# Patient Record
Sex: FEMALE | Race: WHITE | Employment: UNEMPLOYED | URBAN - METROPOLITAN AREA
[De-identification: names, ages, dates, MRNs, and addresses within clinical notes are randomized per-mention and may not be internally consistent; named-entity substitution may affect disease eponyms.]

---

## 2017-05-08 PROCEDURE — 87186 SC STD MICRODIL/AGAR DIL: CPT | Performed by: FAMILY MEDICINE

## 2017-05-08 PROCEDURE — 87088 URINE BACTERIA CULTURE: CPT | Performed by: FAMILY MEDICINE

## 2017-05-08 PROCEDURE — 87086 URINE CULTURE/COLONY COUNT: CPT | Performed by: FAMILY MEDICINE

## 2017-10-10 ENCOUNTER — HOSPITAL ENCOUNTER (EMERGENCY)
Facility: HOSPITAL | Age: 52
Discharge: HOME | End: 2017-10-10
Attending: EMERGENCY MEDICINE
Payer: COMMERCIAL

## 2017-10-10 ENCOUNTER — APPOINTMENT (OUTPATIENT)
Dept: GENERAL RADIOLOGY | Facility: HOSPITAL | Age: 52
End: 2017-10-10
Attending: EMERGENCY MEDICINE
Payer: COMMERCIAL

## 2017-10-10 VITALS
BODY MASS INDEX: 21.71 KG/M2 | HEIGHT: 62 IN | RESPIRATION RATE: 16 BRPM | DIASTOLIC BLOOD PRESSURE: 83 MMHG | OXYGEN SATURATION: 99 % | HEART RATE: 79 BPM | TEMPERATURE: 98 F | WEIGHT: 118 LBS | SYSTOLIC BLOOD PRESSURE: 114 MMHG

## 2017-10-10 DIAGNOSIS — D69.6 THROMBOCYTOPENIA (HCC): ICD-10-CM

## 2017-10-10 DIAGNOSIS — F10.10 CHRONIC ALCOHOL ABUSE: ICD-10-CM

## 2017-10-10 DIAGNOSIS — I95.9 HYPOTENSION, UNSPECIFIED HYPOTENSION TYPE: ICD-10-CM

## 2017-10-10 DIAGNOSIS — R55 SYNCOPE, NEAR: Primary | ICD-10-CM

## 2017-10-10 PROCEDURE — 85025 COMPLETE CBC W/AUTO DIFF WBC: CPT | Performed by: EMERGENCY MEDICINE

## 2017-10-10 PROCEDURE — 96375 TX/PRO/DX INJ NEW DRUG ADDON: CPT

## 2017-10-10 PROCEDURE — 84484 ASSAY OF TROPONIN QUANT: CPT | Performed by: EMERGENCY MEDICINE

## 2017-10-10 PROCEDURE — 80053 COMPREHEN METABOLIC PANEL: CPT | Performed by: EMERGENCY MEDICINE

## 2017-10-10 PROCEDURE — 71010 XR CHEST AP PORTABLE  (CPT=71010): CPT | Performed by: EMERGENCY MEDICINE

## 2017-10-10 PROCEDURE — 99285 EMERGENCY DEPT VISIT HI MDM: CPT

## 2017-10-10 PROCEDURE — 96365 THER/PROPH/DIAG IV INF INIT: CPT

## 2017-10-10 PROCEDURE — 83690 ASSAY OF LIPASE: CPT | Performed by: EMERGENCY MEDICINE

## 2017-10-10 PROCEDURE — 93005 ELECTROCARDIOGRAM TRACING: CPT

## 2017-10-10 PROCEDURE — 96361 HYDRATE IV INFUSION ADD-ON: CPT

## 2017-10-10 PROCEDURE — 96366 THER/PROPH/DIAG IV INF ADDON: CPT

## 2017-10-10 PROCEDURE — 93010 ELECTROCARDIOGRAM REPORT: CPT

## 2017-10-10 RX ORDER — ONDANSETRON 2 MG/ML
4 INJECTION INTRAMUSCULAR; INTRAVENOUS ONCE
Status: COMPLETED | OUTPATIENT
Start: 2017-10-10 | End: 2017-10-10

## 2017-10-10 RX ORDER — SODIUM CHLORIDE 9 MG/ML
INJECTION, SOLUTION INTRAVENOUS CONTINUOUS
Status: DISCONTINUED | OUTPATIENT
Start: 2017-10-10 | End: 2017-10-10

## 2017-10-10 RX ORDER — HYDROMORPHONE HYDROCHLORIDE 1 MG/ML
0.5 INJECTION, SOLUTION INTRAMUSCULAR; INTRAVENOUS; SUBCUTANEOUS ONCE
Status: DISCONTINUED | OUTPATIENT
Start: 2017-10-10 | End: 2017-10-10

## 2017-10-10 NOTE — ED PROVIDER NOTES
Patient Seen in: [facility] Emergency Department    History   Patient presents with:  Nausea/Vomiting/Diarrhea (gastrointestinal)  Dizziness (neurologic)  Hypotension (cardiovascular)  Abdomen/Flank Pain (GI/)    Stated Complaint: DIZZINESS    HPI HPI.  Constitutional and vital signs reviewed. All other systems reviewed and negative except as noted above. PSFH elements reviewed from today and agreed except as otherwise stated in HPI.     Physical Exam   ED Triage Vitals [10/10/17 1106]  BP: 1 Narrative: The following orders were created for panel order CBC WITH DIFFERENTIAL WITH PLATELET.   Procedure                               Abnormality         Status                     ---------                               -----------         ------ [place], [address] [address]  [phone]            Medications Prescribed:  Discharge Medication List as of 10/10/2017  2:26 PM

## 2017-10-10 NOTE — ED INITIAL ASSESSMENT (HPI)
PER MEDICS THEY WERE CALLED FOR PT THAT WAS DIZZY AND HAD FALLEN. ON MEDICS ARRIVAL THEY WERE UNABLE TO OBTAIN A BP AND NOTED NO RADIAL PULSES. PT STATES SHE IS FEELING MUCH BETTER NOW, AAOX3, DENIES CP/FRITZ HOWEVER DID HAVE CP ON Sunday.

## 2018-07-16 ENCOUNTER — APPOINTMENT (OUTPATIENT)
Dept: INTERVENTIONAL RADIOLOGY/VASCULAR | Facility: HOSPITAL | Age: 53
DRG: 062 | End: 2018-07-16
Attending: PHYSICIAN ASSISTANT
Payer: COMMERCIAL

## 2018-07-16 ENCOUNTER — ANESTHESIA (OUTPATIENT)
Dept: PERIOP | Facility: HOSPITAL | Age: 53
DRG: 062 | End: 2018-07-16
Payer: COMMERCIAL

## 2018-07-16 ENCOUNTER — APPOINTMENT (OUTPATIENT)
Dept: GENERAL RADIOLOGY | Facility: HOSPITAL | Age: 53
DRG: 062 | End: 2018-07-16
Attending: EMERGENCY MEDICINE
Payer: COMMERCIAL

## 2018-07-16 ENCOUNTER — APPOINTMENT (OUTPATIENT)
Dept: CT IMAGING | Facility: HOSPITAL | Age: 53
DRG: 062 | End: 2018-07-16
Attending: EMERGENCY MEDICINE
Payer: COMMERCIAL

## 2018-07-16 ENCOUNTER — HOSPITAL ENCOUNTER (INPATIENT)
Facility: HOSPITAL | Age: 53
LOS: 11 days | Discharge: ANOTHER HOSPITAL | DRG: 062 | End: 2018-07-27
Attending: EMERGENCY MEDICINE | Admitting: INTERNAL MEDICINE
Payer: COMMERCIAL

## 2018-07-16 ENCOUNTER — ANESTHESIA EVENT (OUTPATIENT)
Dept: PERIOP | Facility: HOSPITAL | Age: 53
DRG: 062 | End: 2018-07-16
Payer: COMMERCIAL

## 2018-07-16 DIAGNOSIS — I48.91 ATRIAL FIBRILLATION, UNSPECIFIED TYPE (HCC): ICD-10-CM

## 2018-07-16 DIAGNOSIS — E87.6 HYPOKALEMIA: ICD-10-CM

## 2018-07-16 DIAGNOSIS — R53.1 ACUTE LEFT-SIDED WEAKNESS: Primary | ICD-10-CM

## 2018-07-16 DIAGNOSIS — Z79.01 ANTICOAGULATED: ICD-10-CM

## 2018-07-16 DIAGNOSIS — F10.10 ALCOHOL ABUSE: ICD-10-CM

## 2018-07-16 DIAGNOSIS — I42.6 ALCOHOLIC CARDIOMYOPATHY (HCC): ICD-10-CM

## 2018-07-16 LAB
ALBUMIN SERPL-MCNC: 3.3 G/DL (ref 3.5–4.8)
ALBUMIN SERPL-MCNC: 3.6 G/DL (ref 3.5–4.8)
ALP LIVER SERPL-CCNC: 100 U/L (ref 41–108)
ALP LIVER SERPL-CCNC: 90 U/L (ref 41–108)
ALT SERPL-CCNC: 174 U/L (ref 14–54)
ALT SERPL-CCNC: 210 U/L (ref 14–54)
APTT PPP: 30 SECONDS (ref 26.1–34.6)
AST SERPL-CCNC: 404 U/L (ref 15–41)
AST SERPL-CCNC: 531 U/L (ref 15–41)
ATRIAL RATE: 88 BPM
BASOPHILS # BLD AUTO: 0.08 X10(3) UL (ref 0–0.1)
BASOPHILS NFR BLD AUTO: 1.5 %
BILIRUB SERPL-MCNC: 2.1 MG/DL (ref 0.1–2)
BILIRUB SERPL-MCNC: 2.3 MG/DL (ref 0.1–2)
BUN BLD-MCNC: 25 MG/DL (ref 8–20)
BUN BLD-MCNC: 28 MG/DL (ref 8–20)
CALCIUM BLD-MCNC: 10 MG/DL (ref 8.3–10.3)
CALCIUM BLD-MCNC: 8.7 MG/DL (ref 8.3–10.3)
CHLORIDE: 102 MMOL/L (ref 101–111)
CHLORIDE: 96 MMOL/L (ref 101–111)
CHOLEST SMN-MCNC: 269 MG/DL (ref ?–200)
CO2: 20 MMOL/L (ref 22–32)
CO2: 25 MMOL/L (ref 22–32)
CREAT BLD-MCNC: 0.64 MG/DL (ref 0.55–1.02)
CREAT BLD-MCNC: 0.89 MG/DL (ref 0.55–1.02)
EOSINOPHIL # BLD AUTO: 0.03 X10(3) UL (ref 0–0.3)
EOSINOPHIL NFR BLD AUTO: 0.5 %
ERYTHROCYTE [DISTWIDTH] IN BLOOD BY AUTOMATED COUNT: 12.1 % (ref 11.5–16)
EST. AVERAGE GLUCOSE BLD GHB EST-MCNC: 123 MG/DL (ref 68–126)
ETHYL ALCOHOL: <3 MG/DL (ref ?–3)
GLUCOSE BLD-MCNC: 88 MG/DL (ref 70–99)
GLUCOSE BLD-MCNC: 91 MG/DL (ref 70–99)
GLUCOSE BLD-MCNC: 94 MG/DL (ref 65–99)
HAV IGM SER QL: 1.6 MG/DL (ref 1.8–2.5)
HBA1C MFR BLD HPLC: 5.9 % (ref ?–5.7)
HCT VFR BLD AUTO: 44.5 % (ref 34–50)
HDLC SERPL-MCNC: 60 MG/DL (ref 45–?)
HDLC SERPL: 4.48 {RATIO} (ref ?–4.44)
HGB BLD-MCNC: 15.6 G/DL (ref 12–16)
IMMATURE GRANULOCYTE COUNT: 0.03 X10(3) UL (ref 0–1)
IMMATURE GRANULOCYTE RATIO %: 0.5 %
INR BLD: 1.09 (ref 0.9–1.1)
INR BLD: 1.51 (ref 0.9–1.1)
LDLC SERPL CALC-MCNC: 190 MG/DL (ref ?–130)
LYMPHOCYTES # BLD AUTO: 1.64 X10(3) UL (ref 0.9–4)
LYMPHOCYTES NFR BLD AUTO: 29.9 %
M PROTEIN MFR SERPL ELPH: 8.3 G/DL (ref 6.1–8.3)
M PROTEIN MFR SERPL ELPH: 9.4 G/DL (ref 6.1–8.3)
MCH RBC QN AUTO: 35.9 PG (ref 27–33.2)
MCHC RBC AUTO-ENTMCNC: 35.1 G/DL (ref 31–37)
MCV RBC AUTO: 102.3 FL (ref 81–100)
MONOCYTES # BLD AUTO: 0.67 X10(3) UL (ref 0.1–1)
MONOCYTES NFR BLD AUTO: 12.2 %
NEUTROPHIL ABS PRELIM: 3.03 X10 (3) UL (ref 1.3–6.7)
NEUTROPHILS # BLD AUTO: 3.03 X10(3) UL (ref 1.3–6.7)
NEUTROPHILS NFR BLD AUTO: 55.4 %
NONHDLC SERPL-MCNC: 209 MG/DL (ref ?–130)
P AXIS: 13 DEGREES
P-R INTERVAL: 146 MS
PLATELET # BLD AUTO: 120 10(3)UL (ref 150–450)
POTASSIUM SERPL-SCNC: 3.1 MMOL/L (ref 3.6–5.1)
POTASSIUM SERPL-SCNC: 3.4 MMOL/L (ref 3.6–5.1)
PSA SERPL DL<=0.01 NG/ML-MCNC: 14.5 SECONDS (ref 12.4–14.7)
PSA SERPL DL<=0.01 NG/ML-MCNC: 18.8 SECONDS (ref 12.4–14.7)
Q-T INTERVAL: 394 MS
QRS DURATION: 94 MS
QTC CALCULATION (BEZET): 476 MS
R AXIS: 63 DEGREES
RBC # BLD AUTO: 4.35 X10(6)UL (ref 3.8–5.1)
RED CELL DISTRIBUTION WIDTH-SD: 45.9 FL (ref 35.1–46.3)
SODIUM SERPL-SCNC: 136 MMOL/L (ref 136–144)
SODIUM SERPL-SCNC: 137 MMOL/L (ref 136–144)
T AXIS: 130 DEGREES
TRIGL SERPL-MCNC: 97 MG/DL (ref ?–150)
TROPONIN: <0.046 NG/ML (ref ?–0.05)
TROPONIN: <0.046 NG/ML (ref ?–0.05)
VENTRICULAR RATE: 88 BPM
VLDLC SERPL CALC-MCNC: 19 MG/DL (ref 5–40)
WBC # BLD AUTO: 5.5 X10(3) UL (ref 4–13)

## 2018-07-16 PROCEDURE — B31QYZZ FLUOROSCOPY OF CERVICO-CEREBRAL ARCH USING OTHER CONTRAST: ICD-10-PCS | Performed by: RADIOLOGY

## 2018-07-16 PROCEDURE — 70496 CT ANGIOGRAPHY HEAD: CPT | Performed by: EMERGENCY MEDICINE

## 2018-07-16 PROCEDURE — 70450 CT HEAD/BRAIN W/O DYE: CPT | Performed by: EMERGENCY MEDICINE

## 2018-07-16 PROCEDURE — 71045 X-RAY EXAM CHEST 1 VIEW: CPT | Performed by: EMERGENCY MEDICINE

## 2018-07-16 PROCEDURE — B41FYZZ FLUOROSCOPY OF RIGHT LOWER EXTREMITY ARTERIES USING OTHER CONTRAST: ICD-10-PCS | Performed by: RADIOLOGY

## 2018-07-16 PROCEDURE — 3E03317 INTRODUCTION OF OTHER THROMBOLYTIC INTO PERIPHERAL VEIN, PERCUTANEOUS APPROACH: ICD-10-PCS | Performed by: EMERGENCY MEDICINE

## 2018-07-16 PROCEDURE — 70498 CT ANGIOGRAPHY NECK: CPT | Performed by: EMERGENCY MEDICINE

## 2018-07-16 PROCEDURE — 99291 CRITICAL CARE FIRST HOUR: CPT | Performed by: NURSE PRACTITIONER

## 2018-07-16 RX ORDER — SENNOSIDES 8.6 MG
17.2 TABLET ORAL NIGHTLY
Status: DISCONTINUED | OUTPATIENT
Start: 2018-07-16 | End: 2018-07-27

## 2018-07-16 RX ORDER — LIDOCAINE HYDROCHLORIDE 10 MG/ML
INJECTION, SOLUTION INFILTRATION; PERINEURAL
Status: COMPLETED
Start: 2018-07-16 | End: 2018-07-16

## 2018-07-16 RX ORDER — SODIUM CHLORIDE 9 MG/ML
INJECTION, SOLUTION INTRAVENOUS CONTINUOUS
Status: ACTIVE | OUTPATIENT
Start: 2018-07-16 | End: 2018-07-18

## 2018-07-16 RX ORDER — METOCLOPRAMIDE HYDROCHLORIDE 5 MG/ML
10 INJECTION INTRAMUSCULAR; INTRAVENOUS EVERY 8 HOURS PRN
Status: DISCONTINUED | OUTPATIENT
Start: 2018-07-16 | End: 2018-07-27

## 2018-07-16 RX ORDER — HYDROCODONE BITARTRATE AND ACETAMINOPHEN 5; 325 MG/1; MG/1
1 TABLET ORAL EVERY 6 HOURS PRN
Status: DISCONTINUED | OUTPATIENT
Start: 2018-07-16 | End: 2018-07-27

## 2018-07-16 RX ORDER — HEPARIN SODIUM 5000 [USP'U]/ML
INJECTION, SOLUTION INTRAVENOUS; SUBCUTANEOUS
Status: COMPLETED
Start: 2018-07-16 | End: 2018-07-16

## 2018-07-16 RX ORDER — MAGNESIUM OXIDE 400 MG (241.3 MG MAGNESIUM) TABLET
400 TABLET ONCE
Status: COMPLETED | OUTPATIENT
Start: 2018-07-16 | End: 2018-07-16

## 2018-07-16 RX ORDER — ACETAMINOPHEN 650 MG/1
650 SUPPOSITORY RECTAL EVERY 4 HOURS PRN
Status: DISCONTINUED | OUTPATIENT
Start: 2018-07-16 | End: 2018-07-27

## 2018-07-16 RX ORDER — POLYETHYLENE GLYCOL 3350 17 G/17G
17 POWDER, FOR SOLUTION ORAL DAILY PRN
Status: DISCONTINUED | OUTPATIENT
Start: 2018-07-16 | End: 2018-07-27

## 2018-07-16 RX ORDER — BISACODYL 10 MG
10 SUPPOSITORY, RECTAL RECTAL
Status: DISCONTINUED | OUTPATIENT
Start: 2018-07-16 | End: 2018-07-27

## 2018-07-16 RX ORDER — LABETALOL HYDROCHLORIDE 5 MG/ML
10 INJECTION, SOLUTION INTRAVENOUS ONCE AS NEEDED
Status: DISCONTINUED | OUTPATIENT
Start: 2018-07-16 | End: 2018-07-16 | Stop reason: DRUGHIGH

## 2018-07-16 RX ORDER — MORPHINE SULFATE 4 MG/ML
1 INJECTION, SOLUTION INTRAMUSCULAR; INTRAVENOUS EVERY 2 HOUR PRN
Status: DISCONTINUED | OUTPATIENT
Start: 2018-07-16 | End: 2018-07-27

## 2018-07-16 RX ORDER — DOCUSATE SODIUM 100 MG/1
100 CAPSULE, LIQUID FILLED ORAL 2 TIMES DAILY
Status: DISCONTINUED | OUTPATIENT
Start: 2018-07-16 | End: 2018-07-27

## 2018-07-16 RX ORDER — SODIUM CHLORIDE 9 MG/ML
INJECTION, SOLUTION INTRAVENOUS CONTINUOUS
Status: DISCONTINUED | OUTPATIENT
Start: 2018-07-16 | End: 2018-07-16

## 2018-07-16 RX ORDER — ATORVASTATIN CALCIUM 80 MG/1
80 TABLET, FILM COATED ORAL NIGHTLY
Status: DISCONTINUED | OUTPATIENT
Start: 2018-07-16 | End: 2018-07-27

## 2018-07-16 RX ORDER — MORPHINE SULFATE 4 MG/ML
2 INJECTION, SOLUTION INTRAMUSCULAR; INTRAVENOUS EVERY 2 HOUR PRN
Status: DISCONTINUED | OUTPATIENT
Start: 2018-07-16 | End: 2018-07-27

## 2018-07-16 RX ORDER — ONDANSETRON 2 MG/ML
4 INJECTION INTRAMUSCULAR; INTRAVENOUS EVERY 6 HOURS PRN
Status: DISCONTINUED | OUTPATIENT
Start: 2018-07-16 | End: 2018-07-27

## 2018-07-16 RX ORDER — SODIUM PHOSPHATE, DIBASIC AND SODIUM PHOSPHATE, MONOBASIC 7; 19 G/133ML; G/133ML
1 ENEMA RECTAL ONCE AS NEEDED
Status: DISCONTINUED | OUTPATIENT
Start: 2018-07-16 | End: 2018-07-27

## 2018-07-16 RX ORDER — ONDANSETRON 2 MG/ML
4 INJECTION INTRAMUSCULAR; INTRAVENOUS ONCE
Status: COMPLETED | OUTPATIENT
Start: 2018-07-16 | End: 2018-07-16

## 2018-07-16 RX ORDER — NOREPINEPHRINE BITARTRATE 1 MG/ML
INJECTION, SOLUTION INTRAVENOUS
Status: COMPLETED
Start: 2018-07-16 | End: 2018-07-16

## 2018-07-16 RX ORDER — HYDROCODONE BITARTRATE AND ACETAMINOPHEN 5; 325 MG/1; MG/1
1-2 TABLET ORAL EVERY 6 HOURS PRN
Status: DISCONTINUED | OUTPATIENT
Start: 2018-07-16 | End: 2018-07-16 | Stop reason: SDUPTHER

## 2018-07-16 RX ORDER — FAMOTIDINE 10 MG/ML
20 INJECTION, SOLUTION INTRAVENOUS 2 TIMES DAILY
Status: DISCONTINUED | OUTPATIENT
Start: 2018-07-16 | End: 2018-07-27

## 2018-07-16 RX ORDER — ONDANSETRON 2 MG/ML
4 INJECTION INTRAMUSCULAR; INTRAVENOUS EVERY 6 HOURS PRN
Status: DISCONTINUED | OUTPATIENT
Start: 2018-07-16 | End: 2018-07-16

## 2018-07-16 RX ORDER — ACETAMINOPHEN 650 MG/1
650 SUPPOSITORY RECTAL EVERY 4 HOURS PRN
Status: DISCONTINUED | OUTPATIENT
Start: 2018-07-16 | End: 2018-07-16

## 2018-07-16 RX ORDER — PHENYLEPHRINE HCL IN 0.9% NACL 50MG/250ML
PLASTIC BAG, INJECTION (ML) INTRAVENOUS CONTINUOUS PRN
Status: DISCONTINUED | OUTPATIENT
Start: 2018-07-16 | End: 2018-07-26 | Stop reason: HOSPADM

## 2018-07-16 RX ORDER — ONDANSETRON 2 MG/ML
INJECTION INTRAMUSCULAR; INTRAVENOUS
Status: DISPENSED
Start: 2018-07-16 | End: 2018-07-17

## 2018-07-16 RX ORDER — LABETALOL HYDROCHLORIDE 5 MG/ML
10 INJECTION, SOLUTION INTRAVENOUS EVERY 10 MIN PRN
Status: DISCONTINUED | OUTPATIENT
Start: 2018-07-16 | End: 2018-07-27

## 2018-07-16 RX ORDER — HYDROCODONE BITARTRATE AND ACETAMINOPHEN 5; 325 MG/1; MG/1
2 TABLET ORAL EVERY 6 HOURS PRN
Status: DISCONTINUED | OUTPATIENT
Start: 2018-07-16 | End: 2018-07-27

## 2018-07-16 RX ORDER — ARIPIPRAZOLE 15 MG/1
40 TABLET ORAL EVERY 4 HOURS
Status: COMPLETED | OUTPATIENT
Start: 2018-07-16 | End: 2018-07-17

## 2018-07-16 NOTE — ED PROVIDER NOTES
Patient Seen in: [address]    History   Patient presents with:  Stroke (neurologic)    Stated Complaint: stroke    ELSY Schneider is a 50-year-old female presenting with left-sided weakness.   Patient had an episode starting 37 stated complaint: stroke  Other systems are as noted in HPI. Constitutional and vital signs reviewed. All other systems reviewed and negative except as noted above.     Physical Exam   ED Triage Vitals [07/16/18 1437]  BP: (!) 145/107  Pulse: 103  Res ------                     CBC W/ DIFFERENTIAL[[id number]]          Abnormal            Final result                 Please view results for these tests on the individual orders.    RAINBOW DRAW BLUE   RAINBOW DRAW LAVENDER   RAINBOW DRAW LIGHT GREEN   RAINB tPA administered after discussion of risks, benefits and alternatives to IV tPA with patient and family. All inclusion, absolute exclusion, and relative exclusion criteria reviewed and verbal consent obtained.       Disposition and Plan     Clinical

## 2018-07-16 NOTE — PROCEDURES
[facility]  Pre-Procedure Note  Makayla Anderson Patient Status:  Emergency    1965 MRN [id number]   Location [address] [facility] Attending Whitney Zavala MD   Hosp Day # 0 PCP Aliya Harvey DO     Pre-Op Diagnosis:  IRVIN GUEVARA

## 2018-07-16 NOTE — ANESTHESIA PREPROCEDURE EVALUATION
PRE-OP EVALUATION    Patient Name: Taylor Anderson    Pre-op Diagnosis: * No pre-op diagnosis entered *    * No procedures listed *    * No surgeons found in log *    Pre-op vitals reviewed.   Pulse: 80  Resp: 21  BP: 143/99  SpO2: 99 %  There is no he 07/16/2018   MCHC 35.1 07/16/2018   RDW 12.1 07/16/2018   .0 (L) 07/16/2018       Lab Results  Component Value Date    07/16/2018   K 3.1 (L) 07/16/2018   CL 96 (L) 07/16/2018   CO2 25.0 07/16/2018   BUN 28 (H) 07/16/2018   CREATSERUM 0.89 07/

## 2018-07-16 NOTE — H&P
[facility] Hospitalist History and Physical      Patient presents with:  Stroke (neurologic)       PCP: Jefe Frost DO      History of Present Illness: Patient is a 47year old female with PMH sig for alcoholism, Dilated cardiomyopathy attributed to alcoholism Fam Hx  Family History   Problem Relation Age of Onset   • Hypertension Father    • Neurological Disorder Father      Parkinsons   • Neurological Disorder Mother      alzhemiers and Parkinsons       Review of Systems  Comprehensive ROS reviewed and neg pulmonary vascularity. No pleural effusion or pneumothorax. No lobar consolidation. Tortuous thoracic aorta, unchanged. CONCLUSION:  No active cardiopulmonary process identified.     Dictated by: Keisha Jiménez MD on 7/16/2018 at 15:54     Approved Critical results were discussed with Dr. Florida Noel at 1500 hr on 7/16/2018. Critical results were read back.   Dictated by: Galo Adamson MD on 7/16/2018 at 14:58     Approved by: Galo Adamson MD            Ct Stroke Cta Brain/cta Neck (w Iv)(cpt=70496/7 cerebellar arteries are unremarkable. The basilar artery has a normal course and caliber. The right vertebral artery is dominant. The left vertebral artery is developmentally diminutive and appears to functionally terminate as the left PICA.   There is a stroke, seizure disorder who has been noncompliant with coumadin (self discontinued approximately September 2017) who [facility] [place] [facility] for left sided weakness that began 40 minutes prior to arrival in the ED    Ischemic stroke  -CTA brain with acute occ

## 2018-07-16 NOTE — SIGNIFICANT EVENT
While in ED informed that a possible stroke was in the launch pad. Upon arrival Dr. Enedina Simposn was already assessing patient. Upon arrival found patient with NIH of 9, Best gaze-1, Visual-2, facial palsy-1, left arm-2, left leg-2, sensory-1.     Accompanie

## 2018-07-16 NOTE — ANESTHESIA POSTPROCEDURE EVALUATION
[address] Patient Status:  Emergency   Age/Gender 47year old female MRN [id number]   Location [address] [facility] Attending No att. providers found   Hosp Day # 0 PCP Fanny Mejia DO       Anesthesia Post-o

## 2018-07-17 ENCOUNTER — APPOINTMENT (OUTPATIENT)
Dept: CV DIAGNOSTICS | Facility: HOSPITAL | Age: 53
DRG: 062 | End: 2018-07-17
Attending: NURSE PRACTITIONER
Payer: COMMERCIAL

## 2018-07-17 ENCOUNTER — APPOINTMENT (OUTPATIENT)
Dept: GENERAL RADIOLOGY | Facility: HOSPITAL | Age: 53
DRG: 062 | End: 2018-07-17
Attending: HOSPITALIST
Payer: COMMERCIAL

## 2018-07-17 ENCOUNTER — APPOINTMENT (OUTPATIENT)
Dept: CT IMAGING | Facility: HOSPITAL | Age: 53
DRG: 062 | End: 2018-07-17
Attending: PHYSICIAN ASSISTANT
Payer: COMMERCIAL

## 2018-07-17 PROBLEM — I63.331 CEREBRAL INFARCTION DUE TO THROMBOSIS OF RIGHT POSTERIOR CEREBRAL ARTERY (HCC): Status: ACTIVE | Noted: 2018-07-17

## 2018-07-17 LAB
ALBUMIN SERPL-MCNC: 2.7 G/DL (ref 3.5–4.8)
ALP LIVER SERPL-CCNC: 82 U/L (ref 41–108)
ALT SERPL-CCNC: 103 U/L (ref 14–54)
AMPHETAMINE URINE: NEGATIVE
AST SERPL-CCNC: 188 U/L (ref 15–41)
BARBITURATES URINE: NEGATIVE
BASOPHILS # BLD AUTO: 0.08 X10(3) UL (ref 0–0.1)
BASOPHILS NFR BLD AUTO: 1.5 %
BENZODIAZEPINES URINE: NEGATIVE
BILIRUB SERPL-MCNC: 2.5 MG/DL (ref 0.1–2)
BUN BLD-MCNC: 15 MG/DL (ref 8–20)
BUN BLD-MCNC: 17 MG/DL (ref 8–20)
CALCIUM BLD-MCNC: 8 MG/DL (ref 8.3–10.3)
CALCIUM BLD-MCNC: 8.4 MG/DL (ref 8.3–10.3)
CANNABINOID URINE: NEGATIVE
CHLORIDE: 109 MMOL/L (ref 101–111)
CHLORIDE: 110 MMOL/L (ref 101–111)
CO2: 20 MMOL/L (ref 22–32)
CO2: 23 MMOL/L (ref 22–32)
COCAINE URINE: NEGATIVE
CREAT BLD-MCNC: 0.62 MG/DL (ref 0.55–1.02)
CREAT BLD-MCNC: 0.67 MG/DL (ref 0.55–1.02)
EOSINOPHIL # BLD AUTO: 0.05 X10(3) UL (ref 0–0.3)
EOSINOPHIL NFR BLD AUTO: 0.9 %
ERYTHROCYTE [DISTWIDTH] IN BLOOD BY AUTOMATED COUNT: 12 % (ref 11.5–16)
ETHYL ALCOHOL, QUALITATIVE: NEGATIVE
GLUCOSE BLD-MCNC: 110 MG/DL (ref 70–99)
GLUCOSE BLD-MCNC: 113 MG/DL (ref 65–99)
GLUCOSE BLD-MCNC: 122 MG/DL (ref 65–99)
GLUCOSE BLD-MCNC: 139 MG/DL (ref 65–99)
GLUCOSE BLD-MCNC: 140 MG/DL (ref 70–99)
GLUCOSE BLD-MCNC: 95 MG/DL (ref 70–99)
HCG URINE QUALITATIVE: NEGATIVE
HCT VFR BLD AUTO: 37 % (ref 34–50)
HGB BLD-MCNC: 12.5 G/DL (ref 12–16)
IMMATURE GRANULOCYTE COUNT: 0.03 X10(3) UL (ref 0–1)
IMMATURE GRANULOCYTE RATIO %: 0.6 %
INR BLD: 1.44 (ref 0.9–1.1)
ISTAT BLOOD GAS BASE EXCESS: 3 MMOL/L (ref ?–30)
ISTAT BLOOD GAS HCO3: 25.7 MEQ/L (ref 22–26)
ISTAT BLOOD GAS O2 SATURATION: 100 % (ref 92–100)
ISTAT BLOOD GAS PCO2: 32 MMHG (ref 35–45)
ISTAT BLOOD GAS PH: 7.52 (ref 7.35–7.45)
ISTAT BLOOD GAS PO2: >430 MMHG (ref 80–105)
ISTAT BLOOD GAS TCO2: 27 MMOL/L (ref 22–32)
ISTAT HEMATOCRIT: 39 % (ref 34–50)
ISTAT IONIZED CALCIUM: 1.09 MMOL/L (ref 1.12–1.32)
ISTAT POTASSIUM: 3.6 MMOL/L (ref 3.6–5.1)
ISTAT SODIUM: 135 MMOL/L (ref 136–144)
LYMPHOCYTES # BLD AUTO: 1.29 X10(3) UL (ref 0.9–4)
LYMPHOCYTES NFR BLD AUTO: 23.7 %
M PROTEIN MFR SERPL ELPH: 6.9 G/DL (ref 6.1–8.3)
MCH RBC QN AUTO: 34.9 PG (ref 27–33.2)
MCHC RBC AUTO-ENTMCNC: 33.8 G/DL (ref 31–37)
MCV RBC AUTO: 103.4 FL (ref 81–100)
MONOCYTES # BLD AUTO: 0.55 X10(3) UL (ref 0.1–1)
MONOCYTES NFR BLD AUTO: 10.1 %
NEUTROPHIL ABS PRELIM: 3.45 X10 (3) UL (ref 1.3–6.7)
NEUTROPHILS # BLD AUTO: 3.45 X10(3) UL (ref 1.3–6.7)
NEUTROPHILS NFR BLD AUTO: 63.2 %
PCP URINE: NEGATIVE
PLATELET # BLD AUTO: 123 10(3)UL (ref 150–450)
POTASSIUM SERPL-SCNC: 3.9 MMOL/L (ref 3.6–5.1)
POTASSIUM SERPL-SCNC: 4.4 MMOL/L (ref 3.6–5.1)
POTASSIUM SERPL-SCNC: 4.4 MMOL/L (ref 3.6–5.1)
PSA SERPL DL<=0.01 NG/ML-MCNC: 18.1 SECONDS (ref 12.4–14.7)
RBC # BLD AUTO: 3.58 X10(6)UL (ref 3.8–5.1)
RED CELL DISTRIBUTION WIDTH-SD: 46.3 FL (ref 35.1–46.3)
SODIUM SERPL-SCNC: 141 MMOL/L (ref 136–144)
SODIUM SERPL-SCNC: 141 MMOL/L (ref 136–144)
WBC # BLD AUTO: 5.5 X10(3) UL (ref 4–13)

## 2018-07-17 PROCEDURE — 93306 TTE W/DOPPLER COMPLETE: CPT | Performed by: NURSE PRACTITIONER

## 2018-07-17 PROCEDURE — 71045 X-RAY EXAM CHEST 1 VIEW: CPT | Performed by: HOSPITALIST

## 2018-07-17 PROCEDURE — 70450 CT HEAD/BRAIN W/O DYE: CPT | Performed by: PHYSICIAN ASSISTANT

## 2018-07-17 PROCEDURE — 99291 CRITICAL CARE FIRST HOUR: CPT | Performed by: OTHER

## 2018-07-17 RX ORDER — LORAZEPAM 2 MG/ML
1 INJECTION INTRAMUSCULAR EVERY 30 MIN PRN
Status: DISCONTINUED | OUTPATIENT
Start: 2018-07-17 | End: 2018-07-20

## 2018-07-17 RX ORDER — LORAZEPAM 2 MG/ML
2 INJECTION INTRAMUSCULAR
Status: DISCONTINUED | OUTPATIENT
Start: 2018-07-17 | End: 2018-07-20

## 2018-07-17 RX ORDER — ASPIRIN 300 MG
300 SUPPOSITORY, RECTAL RECTAL EVERY 24 HOURS
Status: DISCONTINUED | OUTPATIENT
Start: 2018-07-17 | End: 2018-07-24

## 2018-07-17 RX ORDER — DEXMEDETOMIDINE HYDROCHLORIDE 4 UG/ML
INJECTION, SOLUTION INTRAVENOUS CONTINUOUS
Status: DISCONTINUED | OUTPATIENT
Start: 2018-07-17 | End: 2018-07-20

## 2018-07-17 RX ORDER — LORAZEPAM 2 MG/ML
4 INJECTION INTRAMUSCULAR EVERY 10 MIN PRN
Status: DISCONTINUED | OUTPATIENT
Start: 2018-07-17 | End: 2018-07-20

## 2018-07-17 RX ORDER — LORAZEPAM 2 MG/ML
3 INJECTION INTRAMUSCULAR
Status: DISCONTINUED | OUTPATIENT
Start: 2018-07-17 | End: 2018-07-20

## 2018-07-17 NOTE — CONSULTS
[facility] Cardiology Consultation    Diane Anderson Patient Status:  Inpatient    1965 MRN [id number]   [facility] 6NE-A Attending Damaris Dove MD   [facility] Day [facility] PCP Avtar Bateman DO     Reason for Consultation:  Acute CVA, ETO (CORONARY)      Comment: LAD-thrombotic event  No date:   2018: IR ANGIOGRAM CEREBRAL CAROTID BILATERAL      Comment:    No date:   Family History   Problem Relation Age of Onset   • Hypertension Father    • Neurological Disorder Father changes        Labs:   HEM:  Recent Labs   Lab  07/16/18   1433  07/17/18   0414   WBC  5.5  5.5   HGB  15.6  12.5   PLT  120.0*  123.0*       Chem:  Recent Labs   Lab  07/16/18   1433  07/16/18   1746  07/17/18   0414   NA  137  136  141   K  3.1*  3.4* alternative in this setting.       Valerie Nichols  7/17/2018  7:14 AM

## 2018-07-17 NOTE — PROGRESS NOTES
[facility] Hospitalist Progress Note                                                                   [address]  5/5/1965    CC: f/u CVA    SUBJECTIVE:    Pt seen in afternoon.  Patient Continuous Infusions:   • dexmedetomidine 0.2 mcg/kg/hr (07/17/18 1635)   • NiCARdipine     • sodium chloride 100 mL/hr at 07/17/18 1027   • phenylephrine     • norepinephrine 11 mcg/min (07/17/18 1512)     PRN: LORazepam, LORazepam, LORazepam, LORazep been noncompliant with medications  -Cardiology consulted, appreciate  -TTE ordered  -started on statin    Thrombocytopenia  -Chronic, attributed to alcohol use    DVT ppx: scd's    Meredith Ramírez MD  [facility] Hospitalist  Pager: [phone]

## 2018-07-17 NOTE — SLP NOTE
ADULT SWALLOWING EVALUATION    ASSESSMENT    ASSESSMENT/OVERALL IMPRESSION:  Patient seen for swallowing evaluation per stroke protocol.   Patient with history of CVA 5 years ago with no reported residual.  Patient does have a history of alcohol abuse and r Cardiomyopathy ([facility] 11/13    ?etoh   • CORONARY ARTERY DISEASE    • Depression    • Heart attack ([facility] 05/21/2012   • History of blood clots     legs; heart; head   • History of ETOH abuse    • Pneumonia, organism unspecified(486)    • Psychiatric disorde solid  Method of Presentation: Self presentation;Staff/Clinician assistance;Straw;Single sips; Consecutive swallows  Patient Positioning: Upright;Midline (bedside chair)    Oral Phase of Swallow:  Within Functional Limits                      Pharyngeal Phas

## 2018-07-17 NOTE — PROGRESS NOTES
[employer]  Neurocritical Care APRN Progress Note    NAME: Rachelle Anderson - ROOM: [facility]/4311-X - MRN: [id number] - Age: 47year old - :1965    History Of Present Illness:  Nadja Clayton is a 47year old female with PMH Packs/day: 0.75      Years: 10.00        Quit date: 11/3/1996  Smokeless tobacco: Never Used                      Alcohol use:  Yes              Comment: \"amount varies\" last 01/22/16      Review of Systems:   A comprehensive 14 point review of systems Ct Stroke Brain (no Iv)(cpt=70450)    Result Date: 7/16/2018  CONCLUSION:   1. No acute intracranial hemorrhage or evidence of acute territorial infarction. 2.  Stable encephalomalacia from old infarct in the right parietal lobe.   3. There are increased MG 1.6 07/16/2018       Assessment/Plan:  1.  Ischemic Stroke s/p TPA and cerviocerebral angiography      - Ischemic stroke order set      - Neuro checks Q1h      - NIH daily      - 0.9NS at 75      - Maintain SBP between 140-180      - PRN Cardene, Labetal

## 2018-07-17 NOTE — OCCUPATIONAL THERAPY NOTE
OCCUPATIONAL THERAPY EVALUATION - INPATIENT     Room Number: 4408/0034-H  Evaluation Date: 7/17/2018  Type of Evaluation: Initial  Presenting Problem: CVA    Physician Order: IP Consult to Occupational Therapy  Reason for Therapy: ADL/IADL Dysfunction and (Comment) (CIWA protocol)  Fall Risk: High fall risk    WEIGHT BEARING RESTRICTION  Weight Bearing Restriction: None                PAIN ASSESSMENT  Ratin  Location: pt reported no pain       COGNITION  Overall Cognitive Status:  Impaired  Arousal/Aler NEUROLOGICAL FINDINGS                   ACTIVITY TOLERANCE   Room air  No shortness of breath    ACTIVITIES OF DAILY LIVING ASSESSMENT  AM-PAC ‘6-Clicks’ Inpatient Daily Activity Short Form  How much help from another person does the patient currently functional level and would benefit from skilled inpatient OT to address the above deficits, maximizing patient’s ability to return safely to her prior level of function.     Patient Complexity  Occupational Profile/Medical History MODERATE - Expanded review

## 2018-07-17 NOTE — PROGRESS NOTES
07/17/18 0953   Clinical Encounter Type   Visited With Family   Patient's Supportive Strategies/Resources Patient has a Confucianist background/Family is [employer], per spouse.     Patient Spiritual Encounters   Spiritual Interventions  provided emotio

## 2018-07-17 NOTE — CM/SW NOTE
07/17/18 1400   CM/SW Referral Data   Referral Source Physician   Reason for Referral Discharge planning   Informant Patient;Spouse   Social History   Recreational Drug/Alcohol Use no   Major Changes Last 6 Months no   Domestic/Partner Violence no   Crystal Gaines

## 2018-07-17 NOTE — CONSULTS
[facility]  Interventional Neuroradiology  Consultation Note    Courtney Party [place] Patient Status:  Inpatient    1965 MRN [id number]   [facility] 6NE-A Attending Hansel Peterson MD   [facility] Day # 1 PCP Alice Bess DO     RE drinks alcohol. She reports that she does not use drugs. ALLERGIES:    Penicillins             RASH    Comment:Once when a child. Got a dose as an adult with no             reaction    MEDICATIONS:    No current outpatient prescriptions on file.     Cody Chamberlain mcg/min Intravenous Continuous   HYDROcodone-acetaminophen (NORCO) 5-325 MG per tab 1 tablet 1 tablet Oral Q6H PRN   Or      HYDROcodone-acetaminophen (NORCO) 5-325 MG per tab 2 tablet 2 tablet Oral Q6H PRN       REVIEW OF SYSTEMS:  A 10-point system was r 07/16/2018   HDL 60 07/16/2018   TRIG 97 07/16/2018   VLDL 19 07/16/2018             IMAGING:  CTA HEAD AND NECK:    1.  Acute occlusion of the right posterior cerebral artery beginning within the P2 segment and extending distally.   2. No evidence of occlu

## 2018-07-17 NOTE — BH PROGRESS NOTE
Came to see the pt earlier and PT was with the pt. Came back now to see the pt and was told the pt was just given ativan for the withdrawal symptoms. Unable to do the assessment and will check on her again tomorrow.

## 2018-07-17 NOTE — CONSULTS
[facility]  Neuro critical care consultation    Imani Anderson Patient Status:  Inpatient    1965 MRN [id number]   [facility] 6NE-A Attending Shireen Dodson MD   [facility] Day # 1 PCP Kartik Brown,      Date of Admissio event  No date:   2018: IR ANGIOGRAM CEREBRAL CAROTID BILATERAL      Comment:    No date:   Family History   Problem Relation Age of Onset   • Hypertension Father    • Neurological Disorder Father      Parkinsons   • Neurological Disorder suspension 30 mL, 30 mL, Oral, Daily PRN  •  bisacodyl (DULCOLAX) rectal suppository 10 mg, 10 mg, Rectal, Daily PRN  •  FLEET ENEMA (FLEET) 7-19 GM/118ML enema 133 mL, 1 enema, Rectal, Once PRN  •  ondansetron HCl (ZOFRAN) injection 4 mg, 4 mg, Intravenou 07/17/2018   CA 8.0 07/17/2018   ALKPHO 82 07/17/2018    07/17/2018    07/17/2018   BILT 2.5 07/17/2018   ALB 2.7 07/17/2018   TP 6.9 07/17/2018     Recent Labs   Lab  07/16/18   1433  07/17/18   0414   RBC  4.35  3.58*   HGB  15.6  12.5   HC requiring too much pressor. CT head later today post tpa  MRI brain tomorrow to assess size/location of infarct  Continue CIWA protocol. Continue home Keppra 500 mg bid for alcohol related seizures  PT/OT evaluation when able to    Cardiac:  CAD and DCM.

## 2018-07-17 NOTE — PAYOR COMM NOTE
--------------  ADMISSION REVIEW     Payor: [facility] #:  [id number]  Authorization Number: [id number]    Admit date: 7/16/18  Admit time: 18       Admitting Physician: Prachi Romano MD  Attending Physician:  Petr Hairston Comment: LAD-thrombotic event  No date:   2018: IR ANGIOGRAM CEREBRAL CAROTID BILATERAL      Comment:    No date:           Review of Systems    Positive for stated complaint: stroke  Other systems are as noted in HPI.   Constitutional and DIFFERENTIAL WITH PLATELET.   Procedure                               Abnormality         Status                     ---------                               -----------         ------                     CBC W/ DIFFERENTIAL[[id number]]          Abnormal patient  the risks and benefits of this procedure which he was able to verbalize and consent was given to administer TPA  Admission disposition: 7/16/2018  4:13 PM           tPA administered after discussion of risks, benefits and alternatives to IV Given 20 mg Intravenous Rashel Saucedo RN      HYDROcodone-acetaminophen [facility]) 5-325 MG per tab 1 tablet     Date Action Dose Route User    7/17/2018 5708 Given 1 tablet Oral Rashel Saucedo RN      HYDROcodone-acetaminophen ([facility]) 5-325 MG per t Intravenous Elizabeth Restrepo RN      ondansetron HCl [facility]) injection 4 mg     Date Action Dose Route User    7/16/2018 1510 Given 4 mg Intravenous Rosy ROMERO RN      potassium chloride (K-SOL) 40 meq/30 ml (10%) oral solution 40 mEq     Date Act functionally terminate as the left PICA. There is a 3-vessel aortic arch with minimal mixed plaque. The origins of the branch vessels appear widely patent. The bilateral subclavian arteries and innominate artery are unremarkable.      The common carot

## 2018-07-17 NOTE — PLAN OF CARE
HEMATOLOGIC - ADULT    • Free from bleeding injury Progressing        NEUROLOGICAL - ADULT    • Achieves stable or improved neurological status Progressing    • Absence of seizures Progressing    • Remains free of injury related to seizure activity Progres

## 2018-07-17 NOTE — PHYSICAL THERAPY NOTE
PHYSICAL THERAPY EVALUATION - INPATIENT     Room Number: 6017/3530-I  Evaluation Date: 7/17/2018  Type of Evaluation: Initial  Physician Order: PT Eval and Treat    Presenting Problem: L-sided weakness, acute R CVA s/p tPA  Reason for Therapy: Mobili Regularly Uses: None    Prior Level of [place]: fully indep at baseline without a device. 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  works from home but does travel for work 50% of the time.  College-aged daughter and high-school son will be home un 3-5 steps with a railing?: Total       AM-PAC Score:  Raw Score: 15   PT Approx Degree of Impairment Score: 57.7%   Standardized Score (AM-PAC Scale): 39.45   CMS Modifier (G-Code): CK    FUNCTIONAL ABILITY STATUS  Gait Assessment   Gait Assistance: Not te complexity is considered moderate. These impairments and comorbidities manifest themselves as functional limitations in independent bed mobility, transfers, gait, and stair negotiation.   The patient is below baseline and would benefit from skilled inpati

## 2018-07-18 ENCOUNTER — APPOINTMENT (OUTPATIENT)
Dept: MRI IMAGING | Facility: HOSPITAL | Age: 53
DRG: 062 | End: 2018-07-18
Attending: NURSE PRACTITIONER
Payer: COMMERCIAL

## 2018-07-18 LAB
BASOPHILS # BLD AUTO: 0.05 X10(3) UL (ref 0–0.1)
BASOPHILS NFR BLD AUTO: 1.2 %
BUN BLD-MCNC: 9 MG/DL (ref 8–20)
CALCIUM BLD-MCNC: 7.6 MG/DL (ref 8.3–10.3)
CHLORIDE: 109 MMOL/L (ref 101–111)
CO2: 18 MMOL/L (ref 22–32)
CREAT BLD-MCNC: 0.55 MG/DL (ref 0.55–1.02)
EOSINOPHIL # BLD AUTO: 0 X10(3) UL (ref 0–0.3)
EOSINOPHIL NFR BLD AUTO: 0 %
ERYTHROCYTE [DISTWIDTH] IN BLOOD BY AUTOMATED COUNT: 12.5 % (ref 11.5–16)
GLUCOSE BLD-MCNC: 111 MG/DL (ref 65–99)
GLUCOSE BLD-MCNC: 149 MG/DL (ref 70–99)
GLUCOSE BLD-MCNC: 86 MG/DL (ref 65–99)
HCT VFR BLD AUTO: 33.7 % (ref 34–50)
HGB BLD-MCNC: 11.2 G/DL (ref 12–16)
IMMATURE GRANULOCYTE COUNT: 0.03 X10(3) UL (ref 0–1)
IMMATURE GRANULOCYTE RATIO %: 0.7 %
INR BLD: 1.6 (ref 0.9–1.1)
LYMPHOCYTES # BLD AUTO: 0.24 X10(3) UL (ref 0.9–4)
LYMPHOCYTES NFR BLD AUTO: 5.6 %
MCH RBC QN AUTO: 34.6 PG (ref 27–33.2)
MCHC RBC AUTO-ENTMCNC: 33.2 G/DL (ref 31–37)
MCV RBC AUTO: 104 FL (ref 81–100)
MONOCYTES # BLD AUTO: 0.21 X10(3) UL (ref 0.1–1)
MONOCYTES NFR BLD AUTO: 4.9 %
NEUTROPHIL ABS PRELIM: 3.79 X10 (3) UL (ref 1.3–6.7)
NEUTROPHILS # BLD AUTO: 3.79 X10(3) UL (ref 1.3–6.7)
NEUTROPHILS NFR BLD AUTO: 87.6 %
PLATELET # BLD AUTO: 74 10(3)UL (ref 150–450)
POTASSIUM SERPL-SCNC: 3.8 MMOL/L (ref 3.6–5.1)
PROCALCITONIN SERPL-MCNC: 12.51 NG/ML
PSA SERPL DL<=0.01 NG/ML-MCNC: 19.6 SECONDS (ref 12.4–14.7)
RBC # BLD AUTO: 3.24 X10(6)UL (ref 3.8–5.1)
RED CELL DISTRIBUTION WIDTH-SD: 47.4 FL (ref 35.1–46.3)
SODIUM SERPL-SCNC: 138 MMOL/L (ref 136–144)
WBC # BLD AUTO: 4.3 X10(3) UL (ref 4–13)

## 2018-07-18 PROCEDURE — 70551 MRI BRAIN STEM W/O DYE: CPT | Performed by: NURSE PRACTITIONER

## 2018-07-18 PROCEDURE — 99291 CRITICAL CARE FIRST HOUR: CPT | Performed by: OTHER

## 2018-07-18 RX ORDER — POTASSIUM CHLORIDE 14.9 MG/ML
20 INJECTION INTRAVENOUS ONCE
Status: COMPLETED | OUTPATIENT
Start: 2018-07-18 | End: 2018-07-18

## 2018-07-18 RX ORDER — HEPARIN SODIUM 5000 [USP'U]/ML
5000 INJECTION, SOLUTION INTRAVENOUS; SUBCUTANEOUS EVERY 12 HOURS SCHEDULED
Status: DISCONTINUED | OUTPATIENT
Start: 2018-07-18 | End: 2018-07-19

## 2018-07-18 NOTE — SLP NOTE
Note patient is sedated on precedex as she is going through withdrawal.  She remains NPO. Plan to follow up and check status 7/19/18.     Mallorie Eckert [address] CCC-SLP  Pager [phone]

## 2018-07-18 NOTE — PROGRESS NOTES
[facility] Hospitalist Progress Note                                                                   [address]  5/5/1965    CC: f/u CVA    SUBJECTIVE:  Laying bed, sleepy, becomes agit Nightly   • docusate sodium  100 mg Oral BID   • levETIRAcetam  500 mg Intravenous Q12H     Continuous Infusions:   • dexmedetomidine Stopped (07/18/18 1300)   • NiCARdipine     • sodium chloride 100 mL/hr at 07/17/18 2000   • phenylephrine     • norepinep withdrawal on 7/17  -[facility] protocol  -Precedex drip     CAD, Dilated cardiomyopathy  -Pt has seen cardiology in the past, has been noncompliant with medications  -Cardiology consulted, appreciate  -TTE ordered  -started on statin    Thrombocytopenia  -Chron

## 2018-07-18 NOTE — PROGRESS NOTES
[facility] Cardiology Progress Note        Dede Anderson Patient Status:  Inpatient    1965 MRN [id number]   [facility] 6NE-A Attending Flex Bernstein MD   [facility] Day # 2 PCP Maddy Anton DO     Subjective: dry.     Telemetry: sinus    EKG:      Echo:      Cardiac Cath:      Labs:  HEM:  Recent Labs   Lab  07/16/18   1433  07/17/18   0414  07/18/18   0401   WBC  5.5  5.5  4.3   HGB  15.6  12.5  11.2*   PLT  120.0*  123.0*  74.0*       Chem:  Recent Labs   Lab ETOH.    9. She stopped coumadin 9/16. 10. ETOH withrawl          Plan:  Per medical team for ETOH withdrawal.  Will be a challenge. Will review echo. Volume ok at present. BP's will be quite labile based on level of agitation. Continue PRN meds.

## 2018-07-18 NOTE — PLAN OF CARE
Impaired Functional Mobility    • Achieve highest/safest level of mobility/gait Not Progressing        NEUROLOGICAL - ADULT    • Achieves stable or improved neurological status Not Progressing    • Achieves maximal functionality and self care Not Progressi

## 2018-07-18 NOTE — PHYSICAL THERAPY NOTE
PHYSICAL THERAPY TREATMENT NOTE - INPATIENT    Room Number: 6691/3283-D     Session: 1   Number of Visits to Meet Established Goals: 5    Presenting Problem: L-sided weakness, acute R CVA s/p tPA    Problem List  Principal Problem:    Acute left-sided wea Little   -   Moving from lying on back to sitting on the side of the bed?: A Little   How much help from another person does the patient currently need. ..   -   Moving to and from a bed to a chair (including a wheelchair)?: A Little   -   Need to walk in h Acute rehabilitation     PLAN  PT Treatment Plan: Bed mobility; Endurance; Patient education;Gait training;Strengthening;Transfer training;Balance training;Stair training  Rehab Potential : Good  Frequency (Obs): 5x/week    CURRENT GOALS     Goal #1 Patient

## 2018-07-18 NOTE — PLAN OF CARE
Patient VSS. Neuro status slight improvement with 2hr neuro checks. No complaint of pain, continues to be on levo drip, precedex, and NS. Restraints implemented (soft wrist, and posey vest). CIWA protocol with PRN ativan.   Patient call light within rafa

## 2018-07-18 NOTE — PROGRESS NOTES
[facility]  Neurocritical care progress Note    Kristyn Anderson Patient Status:  Inpatient    1965 MRN [id number]   [facility] 6NE-A Attending Amador Melendrez MD   [facility] Day # 2 PCP Taj Francis DO     Subjective:  Mark Soler 149 07/18/2018   CA 7.6 07/18/2018   INR 1.60 07/18/2018     Imaging:    MRI BRAIN- 7/18     1.  Redemonstration of evolving infarction throughout the right PCA territory including the mid posterior right temporal lobe, right occipital lobe, posterior limb Star and Dr. Selene Duenas at 97 70 84 hr on 7/16/2018. Critical results were read back.  Final critical results were discussed with Dr. Perez Denney and Dr. Selene Duenas at [phone] hr on 7/16/2018.  Critical results were   read back.        CT HEAD     1.  No acute intracra Prophylaxis:  - SCDs in place. Ok to start Heparin 5000 units SQ 12 hr      Time spent 40 minutes to prevent neurologic instability.  This involved direct patient intervention complex and decision-making and or extensive discussion with the patient/family a

## 2018-07-18 NOTE — CM/SW NOTE
OT informed sw that they are advising acute rehab. Pt remains on precedex at this time due to ETOH wd. Not appropriate for MJ eval yet.     Elena Denis, 07/18/18, 3:46 PM

## 2018-07-18 NOTE — OCCUPATIONAL THERAPY NOTE
OCCUPATIONAL THERAPY TREATMENT NOTE - INPATIENT     Room Number: 3562/6733-J  Session: 1   Number of Visits to Meet Established Goals: 5    Presenting Problem: CVA    History related to current admission: Pt is a 47year old female admit on 7/16/2018 for C Total  -   Taking care of personal grooming such as brushing teeth?: Total  -   Eating meals?: Total    AM-PAC Score:  Score: 6  Approx Degree of Impairment: 100%  Standardized Score (AM-PAC Scale): 17.07  CMS Modifier (G-Code): CN    FUNCTIONAL TRANSFER A Good  Frequency (Obs): 5x/week      OT Goals:   ADL Goals  Patient will perform all ADLs: with supervision     Functional Transfer Goals  Patient will perform all functional transfers:  with supervision     UE Exercise Program Goal  Patient will be supervi

## 2018-07-19 ENCOUNTER — APPOINTMENT (OUTPATIENT)
Dept: GENERAL RADIOLOGY | Facility: HOSPITAL | Age: 53
DRG: 062 | End: 2018-07-19
Attending: HOSPITALIST
Payer: COMMERCIAL

## 2018-07-19 LAB
ANION GAP SERPL CALC-SCNC: 8 MMOL/L (ref 0–18)
BASOPHILS # BLD AUTO: 0.03 X10(3) UL (ref 0–0.1)
BASOPHILS NFR BLD AUTO: 0.8 %
BILIRUB UR QL STRIP.AUTO: NEGATIVE
BUN BLD-MCNC: 9 MG/DL (ref 8–20)
BUN/CREAT SERPL: 17 (ref 10–20)
CALCIUM BLD-MCNC: 7.5 MG/DL (ref 8.3–10.3)
CHLORIDE: 110 MMOL/L (ref 101–111)
CO2: 19 MMOL/L (ref 22–32)
COLOR UR AUTO: YELLOW
CREAT BLD-MCNC: 0.53 MG/DL (ref 0.55–1.02)
EOSINOPHIL # BLD AUTO: 0.04 X10(3) UL (ref 0–0.3)
EOSINOPHIL NFR BLD AUTO: 1 %
ERYTHROCYTE [DISTWIDTH] IN BLOOD BY AUTOMATED COUNT: 12.4 % (ref 11.5–16)
GLUCOSE BLD-MCNC: 101 MG/DL (ref 65–99)
GLUCOSE BLD-MCNC: 105 MG/DL (ref 70–99)
GLUCOSE BLD-MCNC: 109 MG/DL (ref 65–99)
GLUCOSE BLD-MCNC: 119 MG/DL (ref 65–99)
GLUCOSE BLD-MCNC: 121 MG/DL (ref 65–99)
GLUCOSE BLD-MCNC: 130 MG/DL (ref 65–99)
GLUCOSE BLD-MCNC: 148 MG/DL (ref 65–99)
GLUCOSE BLD-MCNC: 69 MG/DL (ref 65–99)
GLUCOSE BLD-MCNC: 74 MG/DL (ref 65–99)
GLUCOSE BLD-MCNC: 74 MG/DL (ref 65–99)
GLUCOSE UR STRIP.AUTO-MCNC: NEGATIVE MG/DL
HCT VFR BLD AUTO: 31.4 % (ref 34–50)
HGB BLD-MCNC: 10.8 G/DL (ref 12–16)
IMMATURE GRANULOCYTE COUNT: 0.02 X10(3) UL (ref 0–1)
IMMATURE GRANULOCYTE RATIO %: 0.5 %
INR BLD: 1.31 (ref 0.9–1.1)
LYMPHOCYTES # BLD AUTO: 1.03 X10(3) UL (ref 0.9–4)
LYMPHOCYTES NFR BLD AUTO: 26.2 %
MCH RBC QN AUTO: 35.3 PG (ref 27–33.2)
MCHC RBC AUTO-ENTMCNC: 34.4 G/DL (ref 31–37)
MCV RBC AUTO: 102.6 FL (ref 81–100)
MONOCYTES # BLD AUTO: 0.41 X10(3) UL (ref 0.1–1)
MONOCYTES NFR BLD AUTO: 10.4 %
NEUTROPHIL ABS PRELIM: 2.4 X10 (3) UL (ref 1.3–6.7)
NEUTROPHILS # BLD AUTO: 2.4 X10(3) UL (ref 1.3–6.7)
NEUTROPHILS NFR BLD AUTO: 61.1 %
NITRITE UR QL STRIP.AUTO: NEGATIVE
OSMOLALITY SERPL CALC.SUM OF ELEC: 283 MOSM/KG (ref 275–295)
PH UR STRIP.AUTO: 8 [PH] (ref 4.5–8)
PLATELET # BLD AUTO: 73 10(3)UL (ref 150–450)
POTASSIUM SERPL-SCNC: 3.5 MMOL/L (ref 3.6–5.1)
POTASSIUM SERPL-SCNC: 3.5 MMOL/L (ref 3.6–5.1)
PROCALCITONIN SERPL-MCNC: 8.45 NG/ML
PROT UR STRIP.AUTO-MCNC: NEGATIVE MG/DL
PSA SERPL DL<=0.01 NG/ML-MCNC: 16.8 SECONDS (ref 12.4–14.7)
RBC # BLD AUTO: 3.06 X10(6)UL (ref 3.8–5.1)
RBC #/AREA URNS AUTO: >10 /HPF
RED CELL DISTRIBUTION WIDTH-SD: 46.2 FL (ref 35.1–46.3)
SODIUM SERPL-SCNC: 137 MMOL/L (ref 136–144)
SP GR UR STRIP.AUTO: 1.01 (ref 1–1.03)
UROBILINOGEN UR STRIP.AUTO-MCNC: 4 MG/DL
WBC # BLD AUTO: 3.9 X10(3) UL (ref 4–13)

## 2018-07-19 PROCEDURE — 71045 X-RAY EXAM CHEST 1 VIEW: CPT | Performed by: HOSPITALIST

## 2018-07-19 PROCEDURE — 99291 CRITICAL CARE FIRST HOUR: CPT | Performed by: OTHER

## 2018-07-19 RX ORDER — SODIUM CHLORIDE 9 MG/ML
INJECTION, SOLUTION INTRAVENOUS CONTINUOUS
Status: DISCONTINUED | OUTPATIENT
Start: 2018-07-19 | End: 2018-07-25

## 2018-07-19 RX ORDER — DEXTROSE MONOHYDRATE 25 G/50ML
50 INJECTION, SOLUTION INTRAVENOUS
Status: DISCONTINUED | OUTPATIENT
Start: 2018-07-19 | End: 2018-07-27

## 2018-07-19 RX ORDER — DEXTROSE MONOHYDRATE 25 G/50ML
INJECTION, SOLUTION INTRAVENOUS
Status: DISPENSED
Start: 2018-07-19 | End: 2018-07-19

## 2018-07-19 RX ORDER — DEXTROSE MONOHYDRATE 25 G/50ML
25 INJECTION, SOLUTION INTRAVENOUS AS NEEDED
Status: DISCONTINUED | OUTPATIENT
Start: 2018-07-19 | End: 2018-07-27

## 2018-07-19 NOTE — PROGRESS NOTES
[facility]  Neurocritical care progress Note    Marian Anderson Patient Status:  Inpatient    1965 MRN [id number]   [facility] 6NE-A Attending Destini Guevara MD   [address] Day # 3 PCP Judy Lawler DO     Subjective:  Marian Gabriel 3.5 07/19/2018    07/19/2018   CO2 19.0 07/19/2018    07/19/2018   CA 7.5 07/19/2018   INR 1.31 07/19/2018       Assessment:  Patient Active Problem List:     History of MI (myocardial infarction)     Cardiomyopathy ([facility] [address] [facility]     Abdominal pain line      Time spent 35 minutes to prevent neurologic instability. This involved direct patient intervention complex and decision-making and or extensive discussion with the patient/family and clinical staff. (Exclusive of billlable procedures)     Thank juan luis

## 2018-07-19 NOTE — PROGRESS NOTES
[facility] Hospitalist Progress Note                                                                   [address]  5/5/1965    CC: f/u CVA    SUBJECTIVE:  Per staff, pt very agitated with Intravenous Once   • thiamine (VITAMIN B1) IVPB  100 mg Intravenous Q24H   • Heparin Sodium (Porcine)  5,000 Units Subcutaneous 2 times per day   • aspirin  300 mg Rectal Q24H   • famoTIDine  20 mg Intravenous BID   • atorvastatin  80 mg Oral Nightly   • S PCA  -Cardiology consulted, appreciate    Fever  -Blood cultures (prelim NGTD) - repeat ordered  -UA ordered 7/17 - d/w RN, to be obtained  -CXR 7/17 evening: independently reviewed with no clear PNA; +atelectasis  -repeat CXR today, come coarse bs on righ

## 2018-07-19 NOTE — BH PROGRESS NOTE
Called and talked with the patients nurse, Tevin Lowe. Unable to assess the pt due to her being on precedex. Will check on the pt again tomorrow.

## 2018-07-19 NOTE — PROGRESS NOTES
[facility] Cardiology Progress Note        Corina Anderson Patient Status:  Inpatient    1965 MRN [id number]   [facility] 6NE-A Attending Arthur Koyanagi, MD   [address] Day # 3 PCP Yves Rodriguez DO     Subjective: normal  Cardiac: Regular rhythm, S1, S2 normal,  rub or gallop. No murmur. Lungs: CTA  Abdomen: Soft, non-tender. Extremities: No edema  Neurologic: no focal deficits  Skin: Warm and dry.      Telemetry: sinus    EKG:      Echo:      Cardiac Cath: disorder-historically was to be onon Eugune Rom was to remain on anti seizure medications for 5-7 years  5. Dyslipidemia-  7. Elevated LFTs-normalize on labs when she doesn't drink. 8. Thrombocytopenia - presumed to be due to ETOH.    9.

## 2018-07-19 NOTE — SLP NOTE
Patient continues to be inappropriate for po due to reduced alertness. Will hold and continue to follow peripherally. Will resume services when clinically appropriate.     Zulma Eckert Tutu 87 CCC-SLP  Pager [phone]

## 2018-07-19 NOTE — PROGRESS NOTES
[facility]  Interventional Neuroradiology Progress Note    Orpha Monoa Choromokos Patient Status:  Inpatient    1965 MRN [id number]   [facility] 6NE-A Attending Breanne Aguiar MD   [address] Day # 3 PCP Adelaida Meeks DO     Subjective: 07/19/2018   CA 7.5 07/19/2018   INR 1.31 07/19/2018   PTP 16.8 07/19/2018   PGLU 121 07/19/2018     Imaging:  MRI Brain 7/18/18  CONCLUSION:       1.  Redemonstration of evolving infarction throughout the right PCA territory including the mid posterior rig

## 2018-07-19 NOTE — PHYSICAL THERAPY NOTE
Attempted PT session. Pt is currently inappropriate for therapy due to previous agitation and currently on precedex. Will re-attempt tomorrow as appropriate.

## 2018-07-19 NOTE — PLAN OF CARE
Assumed patient care this am. Levophed gtt infusing. Patient on precedex gtt, sedated. Patient becomes very agitated for a brief moment when aroused, not answering questions or following commands. CIWA protocol. To MRI on monitor, tolerated well.  Precede

## 2018-07-19 NOTE — OCCUPATIONAL THERAPY NOTE
Attempted OT session. Pt is currently inappropriate for therapy due to previous agitation and DTs and currently on precedex.  Will re-attempt tomorrow as appropriate

## 2018-07-19 NOTE — PLAN OF CARE
Early during shift, pt drowsy, but would stay awake for assessment. She was oriented to name, month/year, hospital, stroke; sometimes not getting place or reason correct. Hypoglycemia protocol used for after midnight BS check.   Pt able to take PO at

## 2018-07-20 ENCOUNTER — APPOINTMENT (OUTPATIENT)
Dept: GENERAL RADIOLOGY | Facility: HOSPITAL | Age: 53
DRG: 062 | End: 2018-07-20
Attending: INTERNAL MEDICINE
Payer: COMMERCIAL

## 2018-07-20 LAB
ANION GAP SERPL CALC-SCNC: 10 MMOL/L (ref 0–18)
BUN BLD-MCNC: 8 MG/DL (ref 8–20)
BUN/CREAT SERPL: 14.5 (ref 10–20)
CALCIUM BLD-MCNC: 7.5 MG/DL (ref 8.3–10.3)
CHLORIDE SERPL-SCNC: 108 MMOL/L (ref 101–111)
CO2 SERPL-SCNC: 19 MMOL/L (ref 22–32)
CREAT BLD-MCNC: 0.55 MG/DL (ref 0.55–1.02)
ERYTHROCYTE [DISTWIDTH] IN BLOOD BY AUTOMATED COUNT: 12.5 % (ref 11.5–16)
GLUCOSE BLD-MCNC: 101 MG/DL (ref 65–99)
GLUCOSE BLD-MCNC: 115 MG/DL (ref 65–99)
GLUCOSE BLD-MCNC: 120 MG/DL (ref 70–99)
GLUCOSE BLD-MCNC: 99 MG/DL (ref 65–99)
HCT VFR BLD AUTO: 34.3 % (ref 34–50)
HGB BLD-MCNC: 11.9 G/DL (ref 12–16)
MCH RBC QN AUTO: 35.3 PG (ref 27–33.2)
MCHC RBC AUTO-ENTMCNC: 34.7 G/DL (ref 31–37)
MCV RBC AUTO: 101.8 FL (ref 81–100)
OSMOLALITY SERPL CALC.SUM OF ELEC: 284 MOSM/KG (ref 275–295)
PLATELET # BLD AUTO: 89 10(3)UL (ref 150–450)
POTASSIUM SERPL-SCNC: 3.4 MMOL/L (ref 3.6–5.1)
POTASSIUM SERPL-SCNC: 3.6 MMOL/L (ref 3.6–5.1)
POTASSIUM SERPL-SCNC: 3.6 MMOL/L (ref 3.6–5.1)
RBC # BLD AUTO: 3.37 X10(6)UL (ref 3.8–5.1)
RED CELL DISTRIBUTION WIDTH-SD: 46.6 FL (ref 35.1–46.3)
SODIUM SERPL-SCNC: 137 MMOL/L (ref 136–144)
WBC # BLD AUTO: 3.6 X10(3) UL (ref 4–13)

## 2018-07-20 PROCEDURE — 95822 EEG COMA OR SLEEP ONLY: CPT | Performed by: OTHER

## 2018-07-20 PROCEDURE — 99291 CRITICAL CARE FIRST HOUR: CPT | Performed by: OTHER

## 2018-07-20 PROCEDURE — 71045 X-RAY EXAM CHEST 1 VIEW: CPT | Performed by: INTERNAL MEDICINE

## 2018-07-20 RX ORDER — LORAZEPAM 2 MG/ML
4 INJECTION INTRAMUSCULAR EVERY 10 MIN PRN
Status: DISCONTINUED | OUTPATIENT
Start: 2018-07-20 | End: 2018-07-21

## 2018-07-20 RX ORDER — LORAZEPAM 2 MG/ML
2 INJECTION INTRAMUSCULAR
Status: DISCONTINUED | OUTPATIENT
Start: 2018-07-20 | End: 2018-07-21

## 2018-07-20 RX ORDER — VENLAFAXINE 75 MG/1
150 TABLET ORAL DAILY
Status: DISCONTINUED | OUTPATIENT
Start: 2018-07-20 | End: 2018-07-20

## 2018-07-20 RX ORDER — LORAZEPAM 2 MG/ML
3 INJECTION INTRAMUSCULAR
Status: DISCONTINUED | OUTPATIENT
Start: 2018-07-20 | End: 2018-07-21

## 2018-07-20 RX ORDER — PHENOBARBITAL SODIUM 130 MG/ML
100 INJECTION INTRAMUSCULAR EVERY 12 HOURS
Status: COMPLETED | OUTPATIENT
Start: 2018-07-20 | End: 2018-07-20

## 2018-07-20 RX ORDER — ENOXAPARIN SODIUM 100 MG/ML
40 INJECTION SUBCUTANEOUS DAILY
Status: DISCONTINUED | OUTPATIENT
Start: 2018-07-20 | End: 2018-07-27

## 2018-07-20 RX ORDER — LORAZEPAM 2 MG/ML
1 INJECTION INTRAMUSCULAR EVERY 30 MIN PRN
Status: DISCONTINUED | OUTPATIENT
Start: 2018-07-20 | End: 2018-07-21

## 2018-07-20 NOTE — PROGRESS NOTES
[facility] Cardiology Progress Note        Sulara Bowels Choromokos Patient Status:  Inpatient    1965 MRN [id number]   [facility] 6NE-A Attending Gautam Reece MD   [facility] Day # 4 PCP Sj Kee DO     Subjective: distress. Sedate. HEENT: No focal deficits. Neck: supple. JVP normal  Cardiac: Regular rhythm, S1, S2 normal,  rub or gallop. No murmur. Lungs: CTA  Abdomen: Soft, non-tender. Extremities: No edema  Neurologic: no focal deficits  Skin: Warm and dry. [id number]/17/13- minimal disease in RCA. 7/30/14 angiogram no change from 11/2013. Chronic Apical hypokinesis. 3.  Acute CVA with neuro intervention 7/16/18, prior CVA in 8271- cardio embolic. Non compliant with coumadin.   4. Seizure disorder-histo

## 2018-07-20 NOTE — CM/SW NOTE
SW spoke to [employer]- pt off precedex. Phenobabital started. Still too soon to consider acute rehab eval.  Pt still confused and PT/OT have not been able to work with her.     Elena Denis, 07/20/18, 2:19 PM

## 2018-07-20 NOTE — DIETARY NOTE
NUTRITION INITIAL ASSESSMENT    Pt is at high nutrition risk. Pt does not meet malnutrition criteria.     NUTRITION DIAGNOSIS/PROBLEM:    Inadequate oral intake related to inability to consume sufficient energy as evidenced by NPO status x 4 days    NUTRITI kg (121 lb 11.1 oz)  07/18/18 : 52 kg (114 lb 10.2 oz)  10/10/17 : 53.5 kg (118 lb)  09/07/17 : 54 kg (119 lb)  05/08/17 : 57.2 kg (126 lb 3.2 oz)  03/07/17 : 57.6 kg (127 lb)      NUTRITION:  Diet: NPO  Oral Supplements: none    FOOD/NUTRITION RELATED HIS

## 2018-07-20 NOTE — PLAN OF CARE
NEUROLOGICAL - ADULT    • Achieves stable or improved neurological status Not Progressing    • Achieves maximal functionality and self care Not Progressing          Safety Risk - Non-Violent Restraints    • Patient will remain free from self-harm Not Progr

## 2018-07-20 NOTE — PROGRESS NOTES
[facility]  Neurocritical care progress Note    Rachelle Anderson Patient Status:  Inpatient    1965 MRN [id number]   [facility] 6NE-A Attending Ashish Lynn MD   [address] Day # 4 PCP Namrata Joshi DO     Subjective:  Rachelle Rosa [facility])     Abdominal pain     Alcohol abuse     Chest pain     Alcohol withdrawal seizure ([facility]     Cerebral artery occlusion with cerebral infarction ([facility]     Thrombocytopenia ([facility]     Alcohol withdrawal (HCC)     Intractable abdominal pain     Acute lef

## 2018-07-20 NOTE — SLP NOTE
Attempted follow up, patient remains inappropriate for po given lethargy. Will continue to follow.     Lexi Eckert Tutu 87 CCC-SLP  Pager [phone]

## 2018-07-20 NOTE — PLAN OF CARE
Care asumed @ 0730. Remains stuporous, only responding to painful stimuli, not following commands, not opening eyes. Precedex remains @ 1.4, Levo off, SBP in range. Incontinent of lg amts of odiferous, dark josey urine.   GASTROINTESTINAL - ADULT    • Jenni Carvalho

## 2018-07-20 NOTE — PAYOR COMM NOTE
--------------  CONTINUED STAY REVIEW    7/19      She went into severe DT last night and got about 13 mg of Ativan total per CIWA protocol and on precedex drip  Currently sedated.        Blood pressure (!) 144/100, pulse 85, temperature 99.3 °F (37.4 °C), junction  Delirium tremens        Neuro:  Consult psychiatry service for severe Etoh withdrawal/DT. Continue CIWA protocol and precedex for now  Neurochecks Q 2 hr.  Will ease BP goal to 120-160, wean off pressors  Continue Aspirin and statin therapy  Yossi Clements with EF 35-40%, LV apical thrombus, Anticoagulate when ok with neuro ICU.     Thrombocytopenia  -Chronic, attributed to alcohol use     ACUTE CVA WITH NEURO INTERVENTION 7/16     CCU

## 2018-07-20 NOTE — PROGRESS NOTES
[facility] Pharmacy Consult Note:  Medication List Evaluation for Delirium    Chintan Red is a 47year old female admitted 7/16/18 who has tested positive for delirium per RN evaluation.   Pharmacy has been consulted to evaluate her current medications fo

## 2018-07-20 NOTE — BH PROGRESS NOTE
Talked with the patient's nurse, Ama Harding. She said, the pt is still on precedex. Kong  will check on the pt tomorrow.

## 2018-07-20 NOTE — PLAN OF CARE
Assumed patient care this am. Patient on precedex gtt, sedated. Patient wakes with stimulus but does not answer all orientation questions. Does not follow all commands.  Does not participate in neuro exam. Phenobarb given as ordered and precedex titrated o

## 2018-07-20 NOTE — PLAN OF CARE
NEUROLOGICAL - ADULT    • Absence of seizures Progressing    • Remains free of injury related to seizure activity Progressing        Safety Risk - Non-Violent Restraints    • Patient will remain free from self-harm Progressing          Assumed patient care

## 2018-07-20 NOTE — PROGRESS NOTES
[facility] Hospitalist Progress Note                                                                   [address]  5/5/1965    CC: f/u CVA    SUBJECTIVE:  Sleepy at time of my eval, inter 130*  119*  115*  101*       Meds:   Scheduled:   • PHENobarbital Sodium  100.1 mg Intravenous Q12H   • enoxaparin  40 mg Subcutaneous Daily   • piperacillin-tazobactam  3.375 g Intravenous Q8H   • thiamine (VITAMIN B1) IVPB  100 mg Intravenous Q24H   • as ordered 7/17 - d/w RN, to be obtained  -CXR 7/17 evening: independently reviewed with no clear PNA; +atelectasis  -repeat CXR today, come coarse bs on right side  -UA to be obtained  -PCT significantly elevated at 12 --> 8.45  -start meropneum/azithro for

## 2018-07-21 ENCOUNTER — APPOINTMENT (OUTPATIENT)
Dept: ULTRASOUND IMAGING | Facility: HOSPITAL | Age: 53
DRG: 062 | End: 2018-07-21
Attending: HOSPITALIST
Payer: COMMERCIAL

## 2018-07-21 LAB
AMMONIA PLAS-MCNC: 35 UMOL/L (ref 11–32)
ANION GAP SERPL CALC-SCNC: 12 MMOL/L (ref 0–18)
BASOPHILS # BLD AUTO: 0.03 X10(3) UL (ref 0–0.1)
BASOPHILS NFR BLD AUTO: 0.5 %
BUN BLD-MCNC: 8 MG/DL (ref 8–20)
BUN/CREAT SERPL: 13.3 (ref 10–20)
CALCIUM BLD-MCNC: 7.9 MG/DL (ref 8.3–10.3)
CHLORIDE SERPL-SCNC: 109 MMOL/L (ref 101–111)
CO2 SERPL-SCNC: 18 MMOL/L (ref 22–32)
CREAT BLD-MCNC: 0.6 MG/DL (ref 0.55–1.02)
EOSINOPHIL # BLD AUTO: 0.03 X10(3) UL (ref 0–0.3)
EOSINOPHIL NFR BLD AUTO: 0.5 %
ERYTHROCYTE [DISTWIDTH] IN BLOOD BY AUTOMATED COUNT: 12.7 % (ref 11.5–16)
GLUCOSE BLD-MCNC: 92 MG/DL (ref 65–99)
GLUCOSE BLD-MCNC: 94 MG/DL (ref 70–99)
GLUCOSE BLD-MCNC: 98 MG/DL (ref 65–99)
GLUCOSE BLD-MCNC: 99 MG/DL (ref 65–99)
HAV IGM SER QL: 1.6 MG/DL (ref 1.8–2.5)
HCT VFR BLD AUTO: 34.3 % (ref 34–50)
HGB BLD-MCNC: 11.9 G/DL (ref 12–16)
IMMATURE GRANULOCYTE COUNT: 0.03 X10(3) UL (ref 0–1)
IMMATURE GRANULOCYTE RATIO %: 0.5 %
INR BLD: 1.16 (ref 0.9–1.1)
LYMPHOCYTES # BLD AUTO: 1.24 X10(3) UL (ref 0.9–4)
LYMPHOCYTES NFR BLD AUTO: 21.9 %
MCH RBC QN AUTO: 34.8 PG (ref 27–33.2)
MCHC RBC AUTO-ENTMCNC: 34.7 G/DL (ref 31–37)
MCV RBC AUTO: 100.3 FL (ref 81–100)
MONOCYTES # BLD AUTO: 1 X10(3) UL (ref 0.1–1)
MONOCYTES NFR BLD AUTO: 17.7 %
NEUTROPHIL ABS PRELIM: 3.32 X10 (3) UL (ref 1.3–6.7)
NEUTROPHILS # BLD AUTO: 3.32 X10(3) UL (ref 1.3–6.7)
NEUTROPHILS NFR BLD AUTO: 58.9 %
OPIATES, UR, 6-ACETYLMORPHINE: <10 NG/ML
OPIATES, URINE, CODEINE: <20 NG/ML
OPIATES, URINE, HYDROCODONE: 3357 NG/ML
OPIATES, URINE, HYDROMORPHONE: 99 NG/ML
OPIATES, URINE, MORPHINE: <20 NG/ML
OPIATES, URINE, NORHYDROCODONE: 1527 NG/ML
OPIATES, URINE, NOROXYCODONE: <20 NG/ML
OPIATES, URINE, NOROXYMORPHONE: <20 NG/ML
OPIATES, URINE, OXYCODONE: <20 NG/ML
OPIATES, URINE, OXYMORPHONE: <20 NG/ML
OSMOLALITY SERPL CALC.SUM OF ELEC: 286 MOSM/KG (ref 275–295)
PLATELET # BLD AUTO: 125 10(3)UL (ref 150–450)
POTASSIUM SERPL-SCNC: 3.3 MMOL/L (ref 3.6–5.1)
PROCALCITONIN SERPL-MCNC: 3.59 NG/ML
PSA SERPL DL<=0.01 NG/ML-MCNC: 15.3 SECONDS (ref 12.4–14.7)
RBC # BLD AUTO: 3.42 X10(6)UL (ref 3.8–5.1)
RED CELL DISTRIBUTION WIDTH-SD: 46.4 FL (ref 35.1–46.3)
SODIUM SERPL-SCNC: 139 MMOL/L (ref 136–144)
WBC # BLD AUTO: 5.7 X10(3) UL (ref 4–13)

## 2018-07-21 PROCEDURE — 99232 SBSQ HOSP IP/OBS MODERATE 35: CPT | Performed by: OTHER

## 2018-07-21 PROCEDURE — 93971 EXTREMITY STUDY: CPT | Performed by: HOSPITALIST

## 2018-07-21 RX ORDER — RUFINAMIDE 40 MG/ML
1 SUSPENSION ORAL DAILY
Status: DISCONTINUED | OUTPATIENT
Start: 2018-07-22 | End: 2018-07-27

## 2018-07-21 RX ORDER — PHENOBARBITAL SODIUM 130 MG/ML
100 INJECTION INTRAMUSCULAR EVERY 12 HOURS
Status: COMPLETED | OUTPATIENT
Start: 2018-07-21 | End: 2018-07-21

## 2018-07-21 RX ORDER — LORAZEPAM 2 MG/ML
2 INJECTION INTRAMUSCULAR EVERY 6 HOURS PRN
Status: DISCONTINUED | OUTPATIENT
Start: 2018-07-21 | End: 2018-07-27

## 2018-07-21 RX ORDER — MAGNESIUM SULFATE HEPTAHYDRATE 40 MG/ML
2 INJECTION, SOLUTION INTRAVENOUS ONCE
Status: COMPLETED | OUTPATIENT
Start: 2018-07-21 | End: 2018-07-21

## 2018-07-21 RX ORDER — FOLIC ACID 1 MG/1
1 TABLET ORAL DAILY
Status: DISCONTINUED | OUTPATIENT
Start: 2018-07-22 | End: 2018-07-27

## 2018-07-21 RX ORDER — MELATONIN
100 DAILY
Status: DISCONTINUED | OUTPATIENT
Start: 2018-07-22 | End: 2018-07-27

## 2018-07-21 NOTE — PROGRESS NOTES
[facility] Cardiology Progress Note    Donna Anderson Patient Status:  Inpatient    1965 MRN [id number]   [facility] 6NE-A Attending Ledy Chaves MD   [facility] Day # 5 PCP Myriam Moses DO     Subjective: Kimi Cutler Exam:  Temp:  [98 °F (36.7 °C)-101.6 °F (38.7 °C)] 98 °F (36.7 °C)  Pulse:  [] 78  Resp:  [12-34] 14  BP: (105-160)/() 133/90  General: No apparent distress. Sedate. HEENT: No focal deficits. Neck: supple.  JVP normal  Cardiac: Regular rhythm 07/16/18   1433  07/16/18   1746  07/17/18   0850   ALT  210*  174*  103*   AST  531*  404*  188*   ALB  3.6  3.3*  2.7*       Recent Labs   Lab  07/16/18   1433   TROP  <0.046  <0.046       Recent Labs   Lab  07/18/18   0401  07/19/18   0434  07/21/18   0

## 2018-07-21 NOTE — PROGRESS NOTES
[facility]  Neurocritical care progress Note    Diane Anderson Patient Status:  Inpatient    1965 MRN [id number]   [facility] 6NE-A Attending Андрей Gomes MD   [facility] Day # 5 PCP Avtar Bateman DO     Subjective:  Diane You 07/21/2018   INR 1.16 07/21/2018   MG 1.6 07/21/2018       Assessment:  Patient Active Problem List:     History of MI (myocardial infarction)     Cardiomyopathy ([facility]     Abdominal pain     Alcohol abuse     Chest pain     Al[facility] withdrawal seizure [facility]

## 2018-07-21 NOTE — PLAN OF CARE
Pt. Doesn't follow commands, impulsive at times, no complaints of pain,  at bedside, vitals stable, pt. Can transfer to room 7[facility] all personal belongings with pt, will continue to monitor.

## 2018-07-21 NOTE — PROGRESS NOTES
Pt received to room 3613. Pt asleep, arousable to name, but did not verbally respond. Tele monitor and  applied. Pt in posey and laura. Wrist restraints. TF going at 20 ml/hr. IV zosyn running. Call light placed in reach. Will continue to monitor.

## 2018-07-21 NOTE — PROGRESS NOTES
[facility] Hospitalist Progress Note                                                                   [address]  5/5/1965    CC: f/u CVA    SUBJECTIVE:  Off precedex, requiring freq dos Recent Labs   Lab  07/20/18   0257  07/20/18   0633  07/20/18   1948  07/21/18   0022  07/21/18   0618   PGLU  115*  101*  99  98  92       Meds:   Scheduled:   • [START ON 7/22/2018] Multivitamin Chewtab (ADULT)  1 tablet Oral Daily   • [START ON 7/ MRI with infarct evident in right occipital lobe, consistent with occlusion of R PCA  -Cardiology consulted, appreciate  -EEG    Fever  -Blood cultures (prelim NGTD) - repeat ordered, pending  -UA ordered 7/17 - d/w RN, to be obtained  -CXR 7/17 evening: i

## 2018-07-21 NOTE — PLAN OF CARE
Impaired Swallowing    • Minimize aspiration risk Progressing        NEUROLOGICAL - ADULT    • Absence of seizures Progressing    • Remains free of injury related to seizure activity Progressing          Assumed patient care yesterday evening around 1900.

## 2018-07-22 ENCOUNTER — APPOINTMENT (OUTPATIENT)
Dept: GENERAL RADIOLOGY | Facility: HOSPITAL | Age: 53
DRG: 062 | End: 2018-07-22
Attending: HOSPITALIST
Payer: COMMERCIAL

## 2018-07-22 LAB
ANION GAP SERPL CALC-SCNC: 10 MMOL/L (ref 0–18)
BUN BLD-MCNC: 6 MG/DL (ref 8–20)
BUN/CREAT SERPL: 10.3 (ref 10–20)
CALCIUM BLD-MCNC: 8.2 MG/DL (ref 8.3–10.3)
CHLORIDE SERPL-SCNC: 105 MMOL/L (ref 101–111)
CO2 SERPL-SCNC: 21 MMOL/L (ref 22–32)
CREAT BLD-MCNC: 0.58 MG/DL (ref 0.55–1.02)
GLUCOSE BLD-MCNC: 117 MG/DL (ref 65–99)
GLUCOSE BLD-MCNC: 120 MG/DL (ref 70–99)
GLUCOSE BLD-MCNC: 121 MG/DL (ref 65–99)
GLUCOSE BLD-MCNC: 132 MG/DL (ref 65–99)
HAV IGM SER QL: 1.8 MG/DL (ref 1.8–2.5)
OSMOLALITY SERPL CALC.SUM OF ELEC: 281 MOSM/KG (ref 275–295)
POTASSIUM SERPL-SCNC: 3.4 MMOL/L (ref 3.6–5.1)
POTASSIUM SERPL-SCNC: 3.4 MMOL/L (ref 3.6–5.1)
POTASSIUM SERPL-SCNC: 4 MMOL/L (ref 3.6–5.1)
SODIUM SERPL-SCNC: 136 MMOL/L (ref 136–144)

## 2018-07-22 PROCEDURE — 71045 X-RAY EXAM CHEST 1 VIEW: CPT | Performed by: HOSPITALIST

## 2018-07-22 PROCEDURE — 99233 SBSQ HOSP IP/OBS HIGH 50: CPT | Performed by: OTHER

## 2018-07-22 RX ORDER — ARIPIPRAZOLE 15 MG/1
40 TABLET ORAL EVERY 4 HOURS
Status: COMPLETED | OUTPATIENT
Start: 2018-07-22 | End: 2018-07-22

## 2018-07-22 RX ORDER — MAGNESIUM OXIDE 400 MG (241.3 MG MAGNESIUM) TABLET
400 TABLET ONCE
Status: COMPLETED | OUTPATIENT
Start: 2018-07-22 | End: 2018-07-22

## 2018-07-22 RX ORDER — METOPROLOL TARTRATE 5 MG/5ML
5 INJECTION INTRAVENOUS EVERY 6 HOURS SCHEDULED
Status: DISCONTINUED | OUTPATIENT
Start: 2018-07-22 | End: 2018-07-23

## 2018-07-22 NOTE — PROGRESS NOTES
[facility] Group Cardiology Progress Note    Josefina Moralezomokos Patient Status:  Inpatient    1965 MRN [id number]   [facility] 6NE-A Attending Edna Zarate MD   [address] Day # 6 PCP Leon Jones DO     Subjective: Pt CTA  Abdomen: Soft, non-tender, rounded  Extremities: No edema  Neurologic: Sedated, restrained posey vest  Skin: Warm and dry. Telemetry: sinus tach    Echo done 07/17/2018  1. Left ventricle: The cavity size was mildly increased.  Wall thickness was 1.31*  1.16*     Impression:  1. ETOH Cardiomyopathy-EF- 20% on 11/13/13, 50% on 12/19/13.   EF 25% on 7/27/14.  EF on echo 9/3/14 around 40% with akinetic apex. [facility] 1/20/14- EF 55-60%- no pericardial effusion. EF-4/6/15-10/15%.  2 D Echo on  6/29/15-35-

## 2018-07-22 NOTE — PLAN OF CARE
GASTROINTESTINAL - ADULT    • Maintains adequate nutritional intake (undernourished) Progressing        HEMATOLOGIC - ADULT    • Free from bleeding injury Progressing        Impaired Activities of Daily Living    • Achieve highest/safest level of independe

## 2018-07-22 NOTE — PROGRESS NOTES
[facility] Hospitalist Progress Note                                                                   [address]  5/5/1965    CC: f/u CVA    SUBJECTIVE:  Pt tries to open eyes to voice b 07/22/18   1214   [facility]  [facility]  92  99  117*  132*       Meds:   Scheduled:   • metoprolol Tartrate  5 mg Intravenous 4 times per day   • Multivitamin Chewtab (ADULT)  1 tablet Oral Daily   • folic acid  1 mg Oral Daily   • Thiamine HCl  100 mg Oral Daily   • 3.6  -7/19 ordered meropneum/azithro for now for nosocomial PNA and UTI coverage. Meropenum discontinued per pharm due to risk of seizure --> zosyn ordered (PCN allergy; no reaction to date).     - 7/20 CXR reviewed - no PNA --> dc azithro  -low threshold f

## 2018-07-22 NOTE — PROGRESS NOTES
[id number] [facility] Neurology Progress Note    John Abdulkadir Choromokos Patient Status:  Inpatient    1965 MRN [id number]   [facility] 3NE-A Attending Johana Gutierrez MD   [facility] Day # 6 PCP Ernie Ba DO         Subjective:  7 Brittny Hennessy famoTIDine  20 mg Intravenous BID   • atorvastatin  80 mg Oral Nightly   • Senna  17.2 mg Oral Nightly   • docusate sodium  100 mg Oral BID   • levETIRAcetam  500 mg Intravenous Q12H       Patient Active Problem List:     History of MI (myocardial infarcti potassium chloride  40 mEq Oral Q4H   • metoprolol Tartrate  5 mg Intravenous 4 times per day   • Multivitamin Chewtab (ADULT)  1 tablet Oral Daily   • folic acid  1 mg Oral Daily   • Thiamine HCl  100 mg Oral Daily   • enoxaparin  40 mg Subcutaneous Daily treatment - DNR    (   ) Acute neurologic changes    (   ) Others: New Rx    (   ) ICU >35 minutes total time   Available within moments call in hospital  PROCEDURE DONE    (   ) see notes

## 2018-07-22 NOTE — PLAN OF CARE
Patient sedated and not oriented. NSR/ST on tele. Room air. Tube feedings restarted after cxr confirmation of placement, currently at 40ml/hr. NIH - 7, dysarthria, facial droop. Unable to score NIH accurately due to patient sedation.  Attempted to remove re

## 2018-07-23 ENCOUNTER — APPOINTMENT (OUTPATIENT)
Dept: CT IMAGING | Facility: HOSPITAL | Age: 53
DRG: 062 | End: 2018-07-23
Attending: Other
Payer: COMMERCIAL

## 2018-07-23 ENCOUNTER — APPOINTMENT (OUTPATIENT)
Dept: GENERAL RADIOLOGY | Facility: HOSPITAL | Age: 53
DRG: 062 | End: 2018-07-23
Attending: INTERNAL MEDICINE
Payer: COMMERCIAL

## 2018-07-23 LAB
GLUCOSE BLD-MCNC: 106 MG/DL (ref 65–99)
GLUCOSE BLD-MCNC: 119 MG/DL (ref 65–99)
GLUCOSE BLD-MCNC: 120 MG/DL (ref 65–99)
GLUCOSE BLD-MCNC: 120 MG/DL (ref 65–99)
GLUCOSE BLD-MCNC: 139 MG/DL (ref 65–99)
HAV IGM SER QL: 1.6 MG/DL (ref 1.8–2.5)

## 2018-07-23 PROCEDURE — 99232 SBSQ HOSP IP/OBS MODERATE 35: CPT | Performed by: OTHER

## 2018-07-23 PROCEDURE — 71045 X-RAY EXAM CHEST 1 VIEW: CPT | Performed by: INTERNAL MEDICINE

## 2018-07-23 PROCEDURE — 70450 CT HEAD/BRAIN W/O DYE: CPT | Performed by: OTHER

## 2018-07-23 RX ORDER — METOPROLOL TARTRATE 5 MG/5ML
5 INJECTION INTRAVENOUS EVERY 4 HOURS PRN
Status: DISCONTINUED | OUTPATIENT
Start: 2018-07-23 | End: 2018-07-27

## 2018-07-23 NOTE — OCCUPATIONAL THERAPY NOTE
Spoke with RN, pt is not appropriate to be seen by OT. Pt unable to follow commands and is restrained. Spoke with RN about new orders when appropriate, RN in agreement. Will sign off at this time.

## 2018-07-23 NOTE — PROGRESS NOTES
[facility] Hospitalist Progress Note                                                                   [address]  5/5/1965    CC: f/u CVA    SUBJECTIVE:  Ms. Santopavithra Doran awakens to verbal interval not displayed.        Recent Labs   Lab  07/16/18   1746  07/17/18   0850   ALT  174*  103*   AST  404*  188*   ALB  3.3*  2.7*       Recent Labs   Lab  07/22/18   1214  07/22/18   1817  07/23/18   0025  07/23/18   0544  07/23/18   1255   PGLU  132 threshold for ID consultation     H/O seizure disorder  -Keppra 500 mg bid     Hx EtOH abuse, alcohol withdrawal  -Alcohol level negative on admit  -Showing signs of alcohol withdrawal on 7/17  -continue CIWA protocol     CAD, Dilated cardiomyopathy  -Pt h above    Chinedu Blake,   [facility] Hospitalist  [phone]

## 2018-07-23 NOTE — PLAN OF CARE
Patient here with stroke, on stroke protocol  Neuro checks q4h - pt participates at times with neuro checks but is mostly too drowsy to participate  CT brain done today and no new orders  Left arm is weak and painful, MD aware  Wrist, posey, and siderails

## 2018-07-23 NOTE — SLP NOTE
ADULT SWALLOWING EVALUATION    ASSESSMENT    ASSESSMENT/OVERALL IMPRESSION:  Patient seen to assess swallow function as she is becoming more wakeful. She was evaluated by me 7/17/18 with resulting recommendation of regular consistency and thin liquids.   Paul Ricardo 05/21/2012   • History of blood clots     legs; heart; head   • History of ETOH abuse    • Pneumonia, organism unspecified(486)    • Psychiatric disorder    • Seizure disorder ([facility]    • Stroke ([facility] 04/2015   • Unspecified essential hypertension        Pr Positioning: Upright;Midline (bedside chair)    Oral Phase of Swallow:  (prolonged but functional)                      Pharyngeal Phase of Swallow: Within Functional Limits           (Please note: Silent aspiration cannot be evaluated clinically.  Ervin Grover

## 2018-07-23 NOTE — PROGRESS NOTES
Neurology Progress Note    Diane You Choromotadeos Patient Status:  Inpatient    1965 MRN [id number]   [facility] 3NE-A Attending Trinity Ulrich, [facility] Day # 7 PCP Avtar Bateman DO     Subjective:  Pt was seen and examined in bed 3. Punctate foci of gradient susceptibility noted in the posterior lateral right temporal lobe, mid left temporal lobe, and left frontal lobe likely represent areas of chronic microhemorrhage and/or focal mineralization.        4. There is encephalomalaci

## 2018-07-23 NOTE — DIETARY NOTE
NUTRITION FOLLOW UP ASSESSMENT    Pt is at high nutrition risk. Pt does not meet malnutrition criteria.     NUTRITION DIAGNOSIS/PROBLEM:    Inadequate oral intake related to inability to consume sufficient energy as evidenced by NPO status x 4 days - contin alcoholism, CAD, HTN, hx of stroke, seizure disorder who has been noncompliant with coumadin (self discontinued approximately September 2017) who presented to [facility] for left sided weakness that began 40 minutes prior to arrival in the ED.  Pt had a severe

## 2018-07-23 NOTE — PROGRESS NOTES
[facility] Cardiology Progress Note    Genetta Jeet Anderson Patient Status:  Inpatient    1965 MRN [id number]   [facility] 6NE-A Attending Vinod Britton MD   [address] Day # 7 PCP Amisha Newell DO     Subjective: Pt murmur. Lungs: CTA  Abdomen: Soft, non-tender, rounded  Extremities: No edema  Neurologic: Sedated, restrained posey vest  Skin: Warm and dry. Telemetry: sinus tach    Echo done 07/17/2018  1. Left ventricle: The cavity size was mildly increased.  Latrice Suarez Lab  07/16/18   1433   TROP  <0.046  <0.046       Recent Labs   Lab  07/18/18   0401  07/19/18   0434  07/21/18   0548   PTP  19.6*  16.8*  15.3*   INR  1.60*  1.31*  1.16*     Impression:  1. ETOH Cardiomyopathy-EF- 20% on 11/13/13, 50% on 12/19/13.   E

## 2018-07-23 NOTE — CM/SW NOTE
Care Coordination Rounds held 7/23/2018    Treatment team members present today include , , Charge Nurse, and nurse caring for [facility]    Other care providers present:    Patient Active Problem List:     History of MI

## 2018-07-24 LAB
GLUCOSE BLD-MCNC: 103 MG/DL (ref 65–99)
GLUCOSE BLD-MCNC: 105 MG/DL (ref 65–99)
GLUCOSE BLD-MCNC: 95 MG/DL (ref 65–99)
GLUCOSE BLD-MCNC: 96 MG/DL (ref 65–99)

## 2018-07-24 PROCEDURE — 99232 SBSQ HOSP IP/OBS MODERATE 35: CPT | Performed by: OTHER

## 2018-07-24 RX ORDER — LISINOPRIL 10 MG/1
10 TABLET ORAL DAILY
Status: DISCONTINUED | OUTPATIENT
Start: 2018-07-24 | End: 2018-07-26

## 2018-07-24 RX ORDER — WARFARIN SODIUM 7.5 MG/1
7.5 TABLET ORAL
Status: COMPLETED | OUTPATIENT
Start: 2018-07-24 | End: 2018-07-24

## 2018-07-24 RX ORDER — ASPIRIN 325 MG
325 TABLET, DELAYED RELEASE (ENTERIC COATED) ORAL DAILY
Status: DISCONTINUED | OUTPATIENT
Start: 2018-07-24 | End: 2018-07-27

## 2018-07-24 NOTE — PROGRESS NOTES
Pharmacy Dosing Service: Warfarin    Zenaida Collet is a 47year old female for whom pharmacy has been consulted to dose warfarin (COUMADIN) for Prevention of systemic embolism per Dave GRAY. Based on this indication, the goal INR is 2-3.     Uriel Rome

## 2018-07-24 NOTE — PROGRESS NOTES
[id number] [facility] Neurology Progress Note    Red Anderson Patient Status:  Inpatient    1965 MRN [id number]   [facility] 3NE-A Attending Vaughn Gomez, [address] [facility] [address] Day # 8 PCP Kasie Pierson DO         Subjective:  Red Alvarez Daily   • enoxaparin  40 mg Subcutaneous Daily   • aspirin  300 mg Rectal Q24H   • famoTIDine  20 mg Intravenous BID   • atorvastatin  80 mg Oral Nightly   • Senna  17.2 mg Oral Nightly   • docusate sodium  100 mg Oral BID   • levETIRAcetam  500 mg Yves Matos this am. No acute events overnight. Much more awake and alert today.      O:  In addition to the above  MS: awake and alert , seem apathetic about her current situation  CN: PERRL, EOMI, L VF homonomous hemianopsia, face sensation is intact, face symmetric,

## 2018-07-24 NOTE — PAYOR COMM NOTE
--------------  CONTINUED STAY REVIEW    Payor: Arianne Laughlin  #:  [id number]  Authorization Number: [id number]    Admit date: 7/16/18  Admit time: 18    Admitting Physician: Jeanette Gould MD  Attending Physician:  Sai Hayes Multivitamin Chewtab (ADULT) (CENTRUM) 1 tablet     Date Action Dose Route User    7/23/2018 5144 Given 1 tablet Oral Deepa Roberts RN      docusate sodium (COLACE) cap 100 mg     Date Action Dose Route User    7/23/2018 2142 Given 100 mg Oral Lit DCM. Cardiology consulted and according to them would need   ECHO - EF 35-40%  HTN- lisinopril on hold. Goal SBP as above     -Pulmonary:  - saturating well on RA     Renal:  - Monitor I/O’s and electrolytes.  Continue maintenance IV fluids      - GI  - NPO

## 2018-07-24 NOTE — BH PROGRESS NOTE
Went to see the pt for her history of etoh use. She was upset/tearful and some confusion noted. She admits to drinking 1-3 beers and 2-3 vodka glasses per day. She said, she has been doing this for years.   The pt was more concerned about why she was sti

## 2018-07-24 NOTE — PROGRESS NOTES
[facility] Cardiology Progress Note    Diane Avelino Anderson Patient Status:  Inpatient    1965 MRN [id number]   [facility] 6NE-A Attending Renee Condon MD   [address] Day # 8 PCP Avtar Bateman DO     Subjective:  Co No apparent distress. Confused. HEENT: No focal deficits. Neck: supple. JVP normal  Cardiac: Regular rhythm, S1, S2 normal,  rub or gallop. +ADILIA murmur. Lungs: CTA  Abdomen: Soft, non-tender, rounded  Extremities: No edema  Neurologic: confused.   Skin 1.60*  1.31*  1.16*     Impression:  1. ETOH Cardiomyopathy-EF- 20% on 11/13/13, 50% on 12/19/13.   EF 25% on 7/27/14.  EF on echo 9/3/14 around 40% with akinetic apex. [facility] 1/20/14- EF 55-60%- no pericardial effusion. EF-4/6/15-10/15%.  2 D Echo on  6/29/

## 2018-07-24 NOTE — SLP NOTE
SPEECH DAILY NOTE - INPATIENT    ASSESSMENT & PLAN   ASSESSMENT  Pt seen for dysphagia tx to assess tolerance with recommended diet, ensure proper utilization of aspiration precautions and provide pt/family education. Patient alert and up in bed.   She monteiro Meet Established Goals: 3    Session: 1 of 3    If you have any questions, please contact Huma Cam 87 CCC-SLP  Pager [phone]

## 2018-07-24 NOTE — PROGRESS NOTES
[facility] Hospitalist Progress Note                                                                   [address]  5/5/1965    CC: f/u CVA    SUBJECTIVE:  Ms. Zackary Doran is awake, alert, AMYLASE, LIPASE, LDH in the last 168 hours.     Invalid input(s): ALPHOS, TBIL, DBIL, TPROT    Recent Labs   Lab  07/23/18   0544  07/23/18   1255  07/23/18   1714  07/23/18   2133  07/24/18   0728   PGLU  139*  120*  119*  106*  105*       Meds:   Schedule withdrawal  -Alcohol level negative on admit  -Showing signs of alcohol withdrawal on 7/17  -continue CIWA protocol     CAD, EtOH cardiomyopathy  -Cardiology consult appreciated  -7/17 TTE with EF 35-40%, LV apical thrombus  - noncompliant with medications

## 2018-07-24 NOTE — PROGRESS NOTES
Pt pulled out NG at 0230. Pt is becoming more alert. Drank water with straw without difficulty. Earlier in day did have some yogurt. In addition to tube feed pt has a general diet ordered.   Talked with doctor who states we can hold off on reinserting NG

## 2018-07-25 ENCOUNTER — APPOINTMENT (OUTPATIENT)
Dept: GENERAL RADIOLOGY | Facility: HOSPITAL | Age: 53
DRG: 062 | End: 2018-07-25
Attending: Other
Payer: COMMERCIAL

## 2018-07-25 LAB
GLUCOSE BLD-MCNC: 112 MG/DL (ref 65–99)
GLUCOSE BLD-MCNC: 146 MG/DL (ref 65–99)
GLUCOSE BLD-MCNC: 85 MG/DL (ref 65–99)
GLUCOSE BLD-MCNC: 88 MG/DL (ref 65–99)
INR BLD: 1.1 (ref 0.9–1.1)
PSA SERPL DL<=0.01 NG/ML-MCNC: 14.7 SECONDS (ref 12.4–14.7)

## 2018-07-25 PROCEDURE — 73130 X-RAY EXAM OF HAND: CPT | Performed by: OTHER

## 2018-07-25 PROCEDURE — 73110 X-RAY EXAM OF WRIST: CPT | Performed by: OTHER

## 2018-07-25 PROCEDURE — 99232 SBSQ HOSP IP/OBS MODERATE 35: CPT | Performed by: OTHER

## 2018-07-25 RX ORDER — WARFARIN SODIUM 7.5 MG/1
7.5 TABLET ORAL
Status: COMPLETED | OUTPATIENT
Start: 2018-07-25 | End: 2018-07-25

## 2018-07-25 RX ORDER — CARVEDILOL 12.5 MG/1
12.5 TABLET ORAL 2 TIMES DAILY WITH MEALS
Status: DISCONTINUED | OUTPATIENT
Start: 2018-07-25 | End: 2018-07-27

## 2018-07-25 NOTE — PROGRESS NOTES
Received patient at 0730  A/O x 4 but remains confused at times and forgetful  NIH daily - 4  Neuro checks Q4hrs  PT recommending acute rehab  Consult placed for Physical Medicine  VSS  IV ancef  XR ordered for left hand/wrist due to severe pain/bruising a

## 2018-07-25 NOTE — CM/SW NOTE
Care Coordination Rounds held 7/25/2018    Treatment team members present today include , , Charge Nurse, and nurse caring for [facility]    Other care providers present:    Patient Active Problem List:     History of MI

## 2018-07-25 NOTE — PROGRESS NOTES
[facility] Hospitalist Progress Note                                                                   [address]  5/5/1965    CC: f/u CVA    SUBJECTIVE:  Ms. Kehinde Menjivar is awake, alert, Lab  07/24/18   1256  07/24/18   1744  07/24/18   2100  07/25/18   0821  07/25/18   1418   PGLU  95  103*  96  88  85       Meds:   Scheduled:   • carvedilol  12.5 mg Oral BID with meals   • Warfarin Sodium  7.5 mg Oral Once at night   • lisinopril  10 m Cardiology consult appreciated  - 7/17 TTE with EF 35-40%, LV apical thrombus  - noncompliant with medications / anticoagulation in the past  - ASA, statin  - ok for anticoagulation per neuro, pharmacy consulted to dose with INR goal 2-3    Thrombocytopeni

## 2018-07-25 NOTE — PROGRESS NOTES
[facility] Cardiology Progress Note    Marian Gabriel Naomycontreras Patient Status:  Inpatient    1965 MRN [id number]   [facility] 6NE-A Attending Paulina Villalpando MD   [facility] Day # 9 PCP Judy Lawler DO     Subjective:  Co °C)  Pulse:  [62-84] 64  Resp:  [18-20] 18  BP: (137-160)/() 137/76  General: No apparent distress. Confusion improved. HEENT: No focal deficits. Neck: supple. JVP normal  Cardiac: Regular rhythm, S1, S2 normal,  rub or gallop. +ADILIA murmur.   Lung PTP  16.8*  15.3*   INR  1.31*  1.16*     Impression:  1. ETOH Cardiomyopathy-EF- 20% on 11/13/13, 50% on 12/19/13.   EF 25% on 7/27/14.  EF on echo 9/3/14 around 40% with akinetic apex. [facility] 1/20/14- EF 55-60%- no pericardial effusion.  EF-4/6/15-10/15%

## 2018-07-25 NOTE — CM/SW NOTE
MSW met with patient and her spouse. MSW explained evaluation process for Acute Rehab. MSW to f/u after eval completed.

## 2018-07-25 NOTE — PROGRESS NOTES
[address]  Neurology Progress Note    Red Anderson Patient Status:  Inpatient    1965 MRN [id number]   [facility] 3NE-A Attending Dao Aggarwal, [address] Day # 9 PCP Kasie Pierson DO         Subjective:  7 Brittny Hennessy Daily   • aspirin EC  325 mg Oral Daily   • ceFAZolin  1 g Intravenous Q8H   • Multivitamin Chewtab (ADULT)  1 tablet Oral Daily   • folic acid  1 mg Oral Daily   • Thiamine HCl  100 mg Oral Daily   • enoxaparin  40 mg Subcutaneous Daily   • famoTIDine  20 independently, reviewed history, labs and imaging, and agree with above note with following additons:     S:  Pt. Was seen and examined in bed this am. No acute events overnight. Has L wrist pain today. More awake and alert.  No new complaints    O:  In add

## 2018-07-25 NOTE — PLAN OF CARE
Assumed patient care at 1900  Patient Alert to self, place, and situation. Still confused at times. Delayed responses  Neuro checks Q 4 hours  Daily NIH-scored a 4 today per day shift RN   Seizure precautions in place  Slight facial droop still present. Minimize aspiration risk Progressing        NEUROLOGICAL - ADULT    • Achieves stable or improved neurological status Progressing    • Absence of seizures Progressing    • Remains free of injury related to seizure activity Progressing    • Achieves maximal

## 2018-07-25 NOTE — OCCUPATIONAL THERAPY NOTE
OCCUPATIONAL THERAPY TREATMENT NOTE - INPATIENT     Room Number: 9968/5390-I  Session: 2  Number of Visits to Meet Established Goals: 5    Presenting Problem: CVA    History related to current admission: Pt is a 47year old female admit on 7/16/2018 for CV Total  -   Taking care of personal grooming such as brushing teeth?: A Lot  -   Eating meals?: A Little    AM-PAC Score:  Score: 9  Approx Degree of Impairment: 79.59%  Standardized Score (AM-PAC Scale): 25.33  CMS Modifier (G-Code): CL    FUNCTIONAL TRANS florets for lunch. When lunch arrived, patients tray consisted of a chicken salad sandwich. Pt with flat affect throughout session.     Since Xrays of the wrist and hand both are negative for any fracture, will refer back to Neuro/hospitalist for possible

## 2018-07-25 NOTE — PHYSICAL THERAPY NOTE
PHYSICAL THERAPY TREATMENT NOTE - INPATIENT    Room Number: 8683/5281-T     Session: 3   Number of Visits to Meet Established Goals: 5    Presenting Problem: L-sided weakness, acute R CVA s/p tPA    History related to current admission: Pt admitted for philip Static Sitting: Fair  Dynamic Sitting: Fair           Static Standing: Poor +  Dynamic Standing: Poor +    ACTIVITY TOLERANCE  Room air    AM-PAC '6-Cli abruptly stopping to change directions, min A required for balance. Requires cues to keep feet inside base of RW while turning. Supervision for sit>sup at end of session.     THERAPEUTIC EXERCISES  Lower Extremity Alternating marching  Ankle pumps  LAQ Stand at assistance level: supervision      Goal #3 Patient is able to ambulate 100 feet with assist device: walker - rolling at assistance level: minimum assistance- met 7/25/2018     New goal: ambulate 350 feet with RW at supervision level    Goal #4

## 2018-07-25 NOTE — PROGRESS NOTES
[facility] Dosing Service  Warfarin (Coumadin) Subsequent Dosing    Mitch Gooden is a 47year old female for whom pharmacy has been dosing warfarin (Coumadin).  Goal INR is 2-3    Recent Labs   Lab  07/19/18   0434  07/21/18   0548  07/25/18   063

## 2018-07-26 LAB
GLUCOSE BLD-MCNC: 103 MG/DL (ref 65–99)
GLUCOSE BLD-MCNC: 116 MG/DL (ref 65–99)
GLUCOSE BLD-MCNC: 130 MG/DL (ref 65–99)
INR BLD: 1.43 (ref 0.9–1.1)
PSA SERPL DL<=0.01 NG/ML-MCNC: 18 SECONDS (ref 12.4–14.7)

## 2018-07-26 PROCEDURE — 99232 SBSQ HOSP IP/OBS MODERATE 35: CPT | Performed by: OTHER

## 2018-07-26 RX ORDER — WARFARIN SODIUM 7.5 MG/1
7.5 TABLET ORAL
Status: COMPLETED | OUTPATIENT
Start: 2018-07-26 | End: 2018-07-26

## 2018-07-26 RX ORDER — LISINOPRIL 10 MG/1
10 TABLET ORAL 2 TIMES DAILY
Status: DISCONTINUED | OUTPATIENT
Start: 2018-07-26 | End: 2018-07-27

## 2018-07-26 NOTE — PROGRESS NOTES
[facility] Hospitalist Progress Note                                                                   [address]  5/5/1965    CC: f/u CVA    SUBJECTIVE:  Ms. Taisha Nieves sleeping, easil Oral Daily   • ceFAZolin  1 g Intravenous Q8H   • Multivitamin Chewtab (ADULT)  1 tablet Oral Daily   • folic acid  1 mg Oral Daily   • Thiamine HCl  100 mg Oral Daily   • enoxaparin  40 mg Subcutaneous Daily   • famoTIDine  20 mg Intravenous BID   • atorv use    LUE swelling, left wrist pain  - venous doppler negative for DVT  - no acute fracture on imaging    DVT ppx: scd's  Nutrition: advancing diet   Regular diet with thin liquids recommended per SLP    Dispo: CTU  Discharge planning: acute rehab recomme

## 2018-07-26 NOTE — PHYSICAL THERAPY NOTE
PHYSICAL THERAPY TREATMENT NOTE - INPATIENT    Room Number: 8219/9534-I     Session: 4  Number of Visits to Meet Established Goals: 5    Presenting Problem: L-sided weakness, acute R CVA s/p tPA    History related to current admission: Pt admitted for zenia Static Sitting: Fair  Dynamic Sitting: Fair           Static Standing: Fair -  Dynamic Standing: Poor +    ACTIVITY TOLERANCE  Room air  No shortness of breath    AM-PAC '6-Cli turn. Pt participates with modified flaherty balance assessment scoring 12/28. Pt with inability to attain tandem stance or SLS > 3 sec without LOB requiring Min A. Stand>sit and sit>supine  c supervision. Patient End of Session: In bed; With [facility] staff;Need assistance- met 7/25/2018     New goal: ambulate 350 feet with RW at supervision level    Goal #4  Pt is able to achieve modified flahrety balance score >14/28 in order to demonstrate decreased fall risk   Goal #5     Goal #6     Goal Comments: Goals establish

## 2018-07-26 NOTE — CM/SW NOTE
3pm  MSW spoke to spouse of Pt, he would like referral to [facility]. MSW spoke to Nilsa Hull from [facility] who states Fiona Carpenter is pending.

## 2018-07-26 NOTE — PROGRESS NOTES
[address] [facility] Cardiology Progress Note    Mauricio Kishanelisa Zis Patient Status:  Inpatient    1965 MRN [id number]   [facility] 6NE-A Attending Stephen Proctor MD   [address] Day # 10 PCP Allan Pascual DO     Subjective:  I rounded  Extremities: No edema  Neurologic: stable. Skin: Warm and dry. Telemetry: sinus tach    Echo done 07/17/2018  1. Left ventricle: The cavity size was mildly increased. Wall thickness was     normal. Systolic function was reduced.  The estimated heavy drinking. Echo 7/17/18 with EF 35-40%. 2. CAD- Hx MI- Emboli to LAD 5/21/12- POBA, apical MI. EF near 50% with apical RWMA [id number]-04/17/13- minimal disease in RCA. 7/30/14 angiogram no change from 11/2013. Chronic Apical hypokinesis.   3.  Acu

## 2018-07-26 NOTE — SLP NOTE
SPEECH/LANGUAGE/COGNITIVE EVALUATION - INPATIENT    Admission Date: 7/16/2018  Evaluation Date: 07/26/18    Reason for Referral: Stroke protocol    ASSESSMENT & PLAN   ASSESSMENT & IMPRESSION  Patient seen for cognitive communication assessment post CVA. Prior Living Situation: Home with spouse  Prior Level of Function: Independent      Imaging Results: No recent applicable imaging    Patient/Family Goals: to get better    Interdisciplinary Communication: Discussed with RN  Disussed with other staff

## 2018-07-26 NOTE — PROGRESS NOTES
[facility] [facility] Neurology Progress Note    Namita Medina Choromokos Patient Status:  Inpatient    1965 MRN [id number]   [facility] 3NE-A Attending Pierce Denver, [address] Day # 10 PCP Jefe Frost,          Subjective:  Raiza Neri ceFAZolin  1 g Intravenous Q8H   • Multivitamin Chewtab (ADULT)  1 tablet Oral Daily   • folic acid  1 mg Oral Daily   • Thiamine HCl  100 mg Oral Daily   • enoxaparin  40 mg Subcutaneous Daily   • famoTIDine  20 mg Intravenous BID   • atorvastatin  80 mg imaging, and agree with above note with following additons:     S:  Pt.  Was seen and examined in bed this am. No acute events overnight    O:  In addition to the above  MS: awake and alert and oriented x 3  CN: PERRL, EOMI, L VF cut face sensation is intac

## 2018-07-26 NOTE — CONSULTS
[address] Justin Patient Status:  Inpatient    1965 MRN [id number]   [facility] 3NE-A Attending Breana Brand, [address] [facility] Day # 10 PCP Julissa Escobedo DO     Patient Identification  Edwige Nance is a 47 yea 7/18/2018.     2 D Echo Conclusions:    1. Left ventricle: The cavity size was mildly increased. Wall thickness was     normal. Systolic function was reduced. The estimated ejection fraction     was 35-40%. Dyskinesis of the apical myocardium.  Hypokinesis 7.5 mg 7.5 mg Oral Once at night   aspirin EC EC tab 325 mg 325 mg Oral Daily   metoprolol Tartrate (LOPRESSOR) 5 MG/5ML injection 5 mg 5 mg Intravenous Q4H PRN   ceFAZolin (ANCEF) IVPB 1g/100ml in 0.9% NaCl minibag/add-van 1 g Intravenous Q8H   [COMPLETED 75 mL Intravenous ONCE PRN   [COMPLETED] ondansetron HCl (ZOFRAN) injection 4 mg 4 mg Intravenous Once   [COMPLETED] potassium chloride 40 mEq in sodium chloride 0.9 % 250 mL IVPB 40 mEq Intravenous Once   [COMPLETED] alteplase (ACTIVASE) 100 MG injection Intravenous Q8H PRN   levETIRAcetam (KEPPRA) 500 mg in sodium chloride 0.9 % 100 mL IVPB 500 mg Intravenous Q12H   [COMPLETED] potassium chloride (K-SOL) 40 meq/30 ml (10%) oral solution 40 mEq 40 mEq Oral Q4H   [COMPLETED] magnesium oxide (MAG-OX) tab 400 gums normal. Moist mucous membranes. Neck: No neck masses or thyroid enlargement/tenderness/nodules. Lungs:   Resonant, clear breath sounds, quiet accessory muscles. Chest wall:  No tenderness or deformity.    Cardiovascular:  Heart with regular to monitor kidney function, cardiac function, pulmonary status, neurologic status, DVT prevention. Estimated length of stay in recommended rehabilitation level of care is 2 weeks.       The patient has good potential to achieve the goal to r

## 2018-07-26 NOTE — PROGRESS NOTES
[address] [facility] Dosing Service  Warfarin (Coumadin) Subsequent Dosing    Edda Najjar is a 47year old female for whom pharmacy has been dosing warfarin (Coumadin).  Goal INR is 2-3    Recent Labs   Lab  07/21/18   0548  07/25/18   0639  07/26/18   054

## 2018-07-26 NOTE — PLAN OF CARE
Pt currently resting comfortably in bed. A&Ox4.  RA, spo>93%. SR on tele. NIH of 4 this AM.  Denies complaint of pain or discomfort.   Intermittently tearful, stating she \"wants my body back\" and expressing distress and not being home for her daughter

## 2018-07-26 NOTE — DIETARY NOTE
NUTRITION FOLLOW UP ASSESSMENT    Pt is now at LOW nutrition risk. Pt does not meet malnutrition criteria.     NUTRITION DIAGNOSIS/PROBLEM:    Inadequate oral intake related to inability to consume sufficient energy as evidenced by NPO status x 4 days - res advanced.     47year old female with PMH sig for alcoholism, Dilated cardiomyopathy attributed to alcoholism, CAD, HTN, hx of stroke, seizure disorder who has been noncompliant with coumadin (self discontinued approximately September 2017) who presented to

## 2018-07-27 VITALS
WEIGHT: 122.56 LBS | HEART RATE: 69 BPM | TEMPERATURE: 98 F | BODY MASS INDEX: 22 KG/M2 | RESPIRATION RATE: 18 BRPM | SYSTOLIC BLOOD PRESSURE: 124 MMHG | OXYGEN SATURATION: 98 % | DIASTOLIC BLOOD PRESSURE: 75 MMHG

## 2018-07-27 LAB
ERYTHROCYTE [DISTWIDTH] IN BLOOD BY AUTOMATED COUNT: 13.3 % (ref 11.5–16)
GLUCOSE BLD-MCNC: 120 MG/DL (ref 65–99)
HCT VFR BLD AUTO: 31.2 % (ref 34–50)
HGB BLD-MCNC: 10.5 G/DL (ref 12–16)
INR BLD: 1.73 (ref 0.9–1.1)
MCH RBC QN AUTO: 34.5 PG (ref 27–33.2)
MCHC RBC AUTO-ENTMCNC: 33.7 G/DL (ref 31–37)
MCV RBC AUTO: 102.6 FL (ref 81–100)
PLATELET # BLD AUTO: 341 10(3)UL (ref 150–450)
PSA SERPL DL<=0.01 NG/ML-MCNC: 20.9 SECONDS (ref 12.4–14.7)
RBC # BLD AUTO: 3.04 X10(6)UL (ref 3.8–5.1)
RED CELL DISTRIBUTION WIDTH-SD: 50.6 FL (ref 35.1–46.3)
WBC # BLD AUTO: 7.9 X10(3) UL (ref 4–13)

## 2018-07-27 PROCEDURE — 99232 SBSQ HOSP IP/OBS MODERATE 35: CPT | Performed by: NURSE PRACTITIONER

## 2018-07-27 RX ORDER — ATORVASTATIN CALCIUM 80 MG/1
80 TABLET, FILM COATED ORAL NIGHTLY
Qty: 90 TABLET | Refills: 1 | Status: SHIPPED | OUTPATIENT
Start: 2018-07-27 | End: 2018-08-13

## 2018-07-27 RX ORDER — ACETAMINOPHEN 325 MG/1
650 TABLET ORAL EVERY 4 HOURS PRN
Qty: 30 TABLET | Refills: 0 | Status: SHIPPED | OUTPATIENT
Start: 2018-07-27 | End: 2018-08-13

## 2018-07-27 RX ORDER — WARFARIN SODIUM 7.5 MG/1
7.5 TABLET ORAL
Status: DISCONTINUED | OUTPATIENT
Start: 2018-07-27 | End: 2018-07-27

## 2018-07-27 RX ORDER — LISINOPRIL 10 MG/1
10 TABLET ORAL 2 TIMES DAILY
Qty: 60 TABLET | Refills: 3 | Status: SHIPPED | OUTPATIENT
Start: 2018-07-27 | End: 2018-08-13

## 2018-07-27 NOTE — CONSULTS
[facility] Dosing Service  Warfarin (Coumadin) Subsequent Dosing    Norm Gan is a 47year old female for whom pharmacy has been dosing warfarin (Coumadin).  Goal INR is 2-3    Recent Labs   Lab  07/21/18   0548  07/25/18   0639  07/26/18   054

## 2018-07-27 NOTE — DISCHARGE SUMMARY
General Medicine Discharge Summary     Patient ID:  Makayla Anderson  47year old  5/5/1965    Admit date: 7/16/2018    Discharge date and time: 7/27/18    Attending Physician: DO Margareth Wick artery   - s/p TPA and neuro intervention on 7/16/18  - Repeat CT / MRI with infarct evident in right occipital lobe 2/2 occlusion of R PCA  - Coumadin restarted 7/24/18, INR goal 2-3  - discontinue ASA once INR therapeutic     E Coli UTI  - UCx with pan s PRINIVIL,ZESTRIL  Take 1 tablet (10 mg total) by mouth 2 (two) times daily. What changed:  · medication strength  · how much to take        CONTINUE taking these medications    B-1 100 MG Tabs  Take 1 tablet by mouth daily.      carvedilol 12.5 MG Tabs  Co

## 2018-07-27 NOTE — PLAN OF CARE
Resumed care of pt. At 1900. Pt. Is Ax4 but can be forgetful and impulsive. Neuro checks Q4-[facility] daily  Coumadin  RA, Tele: NS-SB  B/P parameters Systolic high 808, Systolic low 877  Pt.  Can be continent and incontinent  Up with SB assist.  Accu: QID  P

## 2018-07-27 NOTE — CM/SW NOTE
MSW spoke to patient and spouse and Rn. Will use BLS because patient has intermittent periods of confusion and impulsive behavior.           Vernon Helton  [phone]

## 2018-07-27 NOTE — PROGRESS NOTES
[facility] Cardiology Progress Note    Manuel Erikayessi Anderson Patient Status:  Inpatient    1965 MRN [id number]   [facility] 6NE-A Attending Valerio Bautista MD   [facility] Day # 6 PCP Hernando Stephen DO     Subjective:  I JVP normal  Cardiac: Regular rhythm, S1, S2 normal,  rub or gallop. +ADILIA murmur. Lungs: CTA  Abdomen: Soft, non-tender, rounded  Extremities: No edema  Neurologic: stable. Skin: Warm and dry. Telemetry: sinus tach    Echo done 07/17/2018  1.  Left ve 7/17/18 with EF 35-40%. 2. CAD- Hx MI- Emboli to LAD 5/21/12- POBA, apical MI. EF near 50% with apical RWMA AF-zusqudaln-75/17/13- minimal disease in RCA. 7/30/14 angiogram no change from 11/2013. Chronic Apical hypokinesis.   3.  Acute CVA with neuro inte

## 2018-07-27 NOTE — PLAN OF CARE
NURSING DISCHARGE NOTE    Discharged [address] via Ambulance. Accompanied by Support staff  Belongings Taken by patient/family. Valuables returned to patient by security. Report given to Arianne at [facility].  updated.   Paperwork given t

## 2018-07-27 NOTE — PAYOR COMM NOTE
--------------  CONTINUED STAY REVIEW    Payor: Airanne Laughlin  #:  [id number]  Authorization Number: [id number]    Admit date: 7/16/18  Admit time: 18    Admitting Physician: Hansel Peterson MD  Attending Physician:  Karina Araujo, Date Action Dose Route User    7/27/2018 0847 New Bag 500 mg Intravenous Oh Hernandez RN    7/26/2018 2012 New Bag 500 mg Intravenous Milagros Milian RN      lisinopril (PRINIVIL,ZESTRIL) tab 10 mg     Date Action Dose Route User    7/27/2018 0830

## 2018-07-27 NOTE — PROGRESS NOTES
[facility] [facility] Neurology Progress Note    Dede Aquino Choromokos Patient Status:  Inpatient    1965 MRN [id number]   [facility] 3NE-A Attending Amada Ortez, [address] [facility] Day # 11 PCP Maddy Anton DO     Subjective:  [address] [place]   HDL 60  A1C 5.9    Imaging:  No new imaging    [facility] 7/23/18  Conclusion  There is an evolving large acute infarct within the vascular territory of the right posterior cerebral artery with a 1 cm region of petechial hemorrhage within the medial

## 2018-07-28 NOTE — PROCEDURES
CONTINUOUS ELECTROENCEPHALOGRAM REPORT    Patient Name: Di Anderson  Chart ID: [id number]  Ordering Physician: Dakotah Streeter Study Date: 7/20/2018  Patient Diagnosis: Altered mental status    TECHNICAL SUMMARY:  A 20 minutes ambulatory EEG was obt

## 2018-07-30 NOTE — PAYOR COMM NOTE
--------------  DISCHARGE REVIEW    Payor: Arianne Laughlin  #:  [id number]  Authorization Number: [id number]    Admit date: 7/16/18  Admit time:  3418  Discharge Date: 7/27/2018  3:45 PM     Admitting Physician: Yessica Duggan MD  [employer]

## 2018-08-14 ENCOUNTER — OFFICE VISIT (OUTPATIENT)
Dept: NEUROLOGY | Facility: CLINIC | Age: 53
End: 2018-08-14
Payer: COMMERCIAL

## 2018-08-14 VITALS
SYSTOLIC BLOOD PRESSURE: 104 MMHG | WEIGHT: 108 LBS | HEIGHT: 62 IN | HEART RATE: 60 BPM | DIASTOLIC BLOOD PRESSURE: 66 MMHG | BODY MASS INDEX: 19.88 KG/M2 | RESPIRATION RATE: 16 BRPM

## 2018-08-14 DIAGNOSIS — I63.331 CEREBRAL INFARCTION DUE TO THROMBOSIS OF RIGHT POSTERIOR CEREBRAL ARTERY (HCC): Primary | ICD-10-CM

## 2018-08-14 DIAGNOSIS — R56.9 SEIZURE DUE TO ALCOHOL WITHDRAWAL, UNCOMPLICATED (HCC): ICD-10-CM

## 2018-08-14 DIAGNOSIS — F10.230 SEIZURE DUE TO ALCOHOL WITHDRAWAL, UNCOMPLICATED (HCC): ICD-10-CM

## 2018-08-14 PROCEDURE — 99213 OFFICE O/P EST LOW 20 MIN: CPT | Performed by: OTHER

## 2018-08-14 RX ORDER — ATORVASTATIN CALCIUM 40 MG/1
40 TABLET, FILM COATED ORAL NIGHTLY
Qty: 90 TABLET | Refills: 1 | Status: SHIPPED | OUTPATIENT
Start: 2018-08-14 | End: 2019-03-03

## 2018-08-14 RX ORDER — LEVETIRACETAM 500 MG/1
500 TABLET ORAL 2 TIMES DAILY
Qty: 60 TABLET | Refills: 6 | Status: ON HOLD | OUTPATIENT
Start: 2018-08-14 | End: 2019-01-27

## 2018-08-14 NOTE — PROGRESS NOTES
Neurology H&P    Jackie Sorto Choromokos Patient Status:  No patient class for patient encounter    1965 MRN [id number]   Location [address] [place] [address], [address] [place] [address] Drive, [address] [place] [address] Attending No att. providers found   Hosp Day # 0 PCP Мария Ram DO Seizure due to alcohol withdrawal, uncomplicated (HCC)     Cerebral artery occlusion with cerebral infarction (HCC)     Thrombocytopenia (HCC)     Alcohol withdrawal ([facility] [address]     Intractable abdominal pain     Acute left-sided weakness     Hypokalemia     Cer fatigue  Musculoskeletal: denies back pain, denies joint pain  Psych: denies depression   Skin: denies any new masses, rashes, lumps or bruising    Objective/Physical Exam:    Vital Signs:  Blood pressure 104/66, pulse 60, resp.  rate 16, height 62\", weigh arteries. No evidence of hemodynamically significant carotid stenosis by NASCET criteria. 3.  Thyroid nodules would be better assessed with dedicated thyroid ultrasound.     Interventional Radiology   FINDINGS:    Right vertebral angiography:  The dista the proximal fetal right PCA. The new area of occlusion is more distal at the distal P3 segment with good distal leptomeningeal collaterals.   As a   consequence of this finding, which at this point was no longer compatible with a large vessel occlusion, a placed for VF testing. No driving at this time,. Countinue Coumadin,. Continue Atorvastatin 40mg QHS.        Plan:  1. R PCA Stroke  - Continue Coumadin for goal INR 2-3  - Continue Atorvastatin 40mg QHS  - Follow up with PCP and cardiology    Stroke Preven

## 2018-08-14 NOTE — PROGRESS NOTES
Pt here for stroke follow up. NIH is 2 due to left side visual deficit. Pt also reports issues with confusion.

## 2018-08-15 ENCOUNTER — TELEPHONE (OUTPATIENT)
Dept: NEUROLOGY | Facility: CLINIC | Age: 53
End: 2018-08-15

## 2018-08-16 NOTE — TELEPHONE ENCOUNTER
I spoke to Mr Kehinde Menjivar on the phone regarding his wife's BP medications.  He states that Cardiology would like her to be taking an ACE and a beta blocker for her cardiomyopathy but he was concerned as her BP runs around 110SBP and that she might need a hi

## 2018-08-20 ENCOUNTER — TELEPHONE (OUTPATIENT)
Dept: NEUROLOGY | Facility: CLINIC | Age: 53
End: 2018-08-20

## 2018-08-20 NOTE — TELEPHONE ENCOUNTER
Case manger from insurance wanted to let Dr. Kenny Norris know that all medication has been reviewed with patient and all questions answered. Have also gone over stroke care and signs to look for.   If you have any areas that you see patient needs care for ple

## 2018-08-23 PROBLEM — Z79.01 LONG TERM (CURRENT) USE OF ANTICOAGULANTS: Status: ACTIVE | Noted: 2018-08-23

## 2018-08-29 ENCOUNTER — TELEPHONE (OUTPATIENT)
Dept: NEUROLOGY | Facility: CLINIC | Age: 53
End: 2018-08-29

## 2018-08-29 PROBLEM — I95.9 ARTERIAL HYPOTENSION: Status: ACTIVE | Noted: 2018-08-29

## 2018-08-29 PROBLEM — E78.5 DYSLIPIDEMIA: Status: ACTIVE | Noted: 2018-08-29

## 2018-08-29 PROBLEM — E88.09 HYPOALBUMINEMIA DUE TO PROTEIN-CALORIE MALNUTRITION (HCC): Status: ACTIVE | Noted: 2018-08-29

## 2018-08-29 PROBLEM — I50.22 CHRONIC SYSTOLIC HEART FAILURE (HCC): Status: ACTIVE | Noted: 2018-08-29

## 2018-08-29 PROBLEM — E46 HYPOALBUMINEMIA DUE TO PROTEIN-CALORIE MALNUTRITION (HCC): Status: ACTIVE | Noted: 2018-08-29

## 2018-09-26 ENCOUNTER — TELEPHONE (OUTPATIENT)
Dept: NEUROLOGY | Facility: CLINIC | Age: 53
End: 2018-09-26

## 2018-09-27 NOTE — TELEPHONE ENCOUNTER
Dr. Mary Hager has reviewed the [facility] office note 9/27/18. Sent to scanning.

## 2018-10-15 NOTE — PATIENT INSTRUCTIONS
Refill policies:    • Allow 2-3 business days for refills; controlled substances may take longer.   • Contact your pharmacy at least 5 days prior to running out of medication and have them send an electronic request or submit request through the “request re entire amount billed. Precertification and Prior Authorizations: If your physician has recommended that you have a procedure or additional testing performed.   [facility]) will contact your insurance carrier to obtain pre-certi Entrance  • Meetings are in Cardiac Rehab Department on [address] (Renea Omer):  (Stroke Treatment and Resource Support) for survivors, family members, and caregivers;   When: 3rd Monday of month  Time:   3:00-4:30

## 2018-10-15 NOTE — PROGRESS NOTES
Neurology H&P    Trudy Rust Choromokos Patient Status:  No patient class for patient encounter    1965 MRN [id number]   Location [address] [place] [address], [address] [place] [address] Drive, [address] [place] [address] Attending No att. providers found   Hosp Day # 0 PCP Iman Michaud DO pain     Seizure due to alcohol withdrawal, uncomplicated (HCC)     Cerebral artery occlusion with cerebral infarction (HCC)     Thrombocytopenia (HCC)     Intractable abdominal pain     Acute left-sided weakness     Hypokalemia     Cerebral infarction due pain, no new lower extremity swelling  GI: no constipation or abdominal pain  : denies frequent urination or difficulty urinating   Heme: no new bruising, no unexplained fevers or chills  Endocrine: no unexplained weight loss or gain, no new fatigue  Mus 7/16/18  CONCLUSION:       1. Acute occlusion of the right posterior cerebral artery beginning within the P2 segment and extending distally.      2. No evidence of occlusion, dissection, or flow-limiting stenosis in the cervical vertebral or carotid arteri embolus. The capillary and venous phases are otherwise unremarkable with a dominant left transverse and sigmoid system.     =====  CONCLUSION:    1.   There has been interval partial dissolution and presumed distal migration of the embolus occluding the pr cerebral white matter. Assessment: This is a 46 y/o female with a h/o R PCA stroke in setting of coumadin non-compliance. Her exam is noted above. She is now back on coumadin and doing well. Still with L homonomous hemianopsia.  Opthalmology referral pl

## 2018-10-15 NOTE — PROGRESS NOTES
Patient states she is feeling down, but refuses any resources for [facility]. Patient states \" I am just being honest, I do not want any help\".

## 2019-01-26 ENCOUNTER — HOSPITAL ENCOUNTER (OUTPATIENT)
Facility: HOSPITAL | Age: 54
Setting detail: OBSERVATION
Discharge: HOME | End: 2019-01-28
Attending: EMERGENCY MEDICINE | Admitting: HOSPITALIST
Payer: COMMERCIAL

## 2019-01-26 ENCOUNTER — APPOINTMENT (OUTPATIENT)
Dept: GENERAL RADIOLOGY | Facility: HOSPITAL | Age: 54
End: 2019-01-26
Payer: COMMERCIAL

## 2019-01-26 DIAGNOSIS — R07.9 ACUTE CHEST PAIN: Primary | ICD-10-CM

## 2019-01-26 PROCEDURE — 80320 DRUG SCREEN QUANTALCOHOLS: CPT | Performed by: HOSPITALIST

## 2019-01-26 PROCEDURE — 99285 EMERGENCY DEPT VISIT HI MDM: CPT | Performed by: EMERGENCY MEDICINE

## 2019-01-26 PROCEDURE — 36415 COLL VENOUS BLD VENIPUNCTURE: CPT | Performed by: EMERGENCY MEDICINE

## 2019-01-26 PROCEDURE — 84484 ASSAY OF TROPONIN QUANT: CPT | Performed by: EMERGENCY MEDICINE

## 2019-01-26 PROCEDURE — 85610 PROTHROMBIN TIME: CPT | Performed by: EMERGENCY MEDICINE

## 2019-01-26 PROCEDURE — 85025 COMPLETE CBC W/AUTO DIFF WBC: CPT | Performed by: EMERGENCY MEDICINE

## 2019-01-26 PROCEDURE — 71045 X-RAY EXAM CHEST 1 VIEW: CPT | Performed by: EMERGENCY MEDICINE

## 2019-01-26 PROCEDURE — 80053 COMPREHEN METABOLIC PANEL: CPT | Performed by: EMERGENCY MEDICINE

## 2019-01-26 PROCEDURE — 93005 ELECTROCARDIOGRAM TRACING: CPT

## 2019-01-26 PROCEDURE — 93010 ELECTROCARDIOGRAM REPORT: CPT | Performed by: EMERGENCY MEDICINE

## 2019-01-26 PROCEDURE — 84443 ASSAY THYROID STIM HORMONE: CPT | Performed by: HOSPITALIST

## 2019-01-26 PROCEDURE — 83735 ASSAY OF MAGNESIUM: CPT | Performed by: EMERGENCY MEDICINE

## 2019-01-27 ENCOUNTER — APPOINTMENT (OUTPATIENT)
Dept: CT IMAGING | Facility: HOSPITAL | Age: 54
End: 2019-01-27
Attending: EMERGENCY MEDICINE
Payer: COMMERCIAL

## 2019-01-27 ENCOUNTER — APPOINTMENT (OUTPATIENT)
Dept: CV DIAGNOSTICS | Facility: HOSPITAL | Age: 54
End: 2019-01-27
Attending: INTERNAL MEDICINE
Payer: COMMERCIAL

## 2019-01-27 PROBLEM — R07.9 ACUTE CHEST PAIN: Status: ACTIVE | Noted: 2019-01-27

## 2019-01-27 LAB
ALBUMIN SERPL-MCNC: 3.9 G/DL (ref 3.1–4.5)
ALBUMIN/GLOB SERPL: 0.7 {RATIO} (ref 1–2)
ALP LIVER SERPL-CCNC: 101 U/L (ref 41–108)
ALT SERPL-CCNC: 48 U/L (ref 14–54)
ANION GAP SERPL CALC-SCNC: 9 MMOL/L (ref 0–18)
AST SERPL-CCNC: 134 U/L (ref 15–41)
ATRIAL RATE: 119 BPM
BASOPHILS # BLD AUTO: 0.02 X10(3) UL (ref 0–0.1)
BASOPHILS NFR BLD AUTO: 0.4 %
BILIRUB SERPL-MCNC: 0.5 MG/DL (ref 0.1–2)
BUN BLD-MCNC: 10 MG/DL (ref 8–20)
BUN/CREAT SERPL: 13.9 (ref 10–20)
CALCIUM BLD-MCNC: 8.4 MG/DL (ref 8.3–10.3)
CHLORIDE SERPL-SCNC: 106 MMOL/L (ref 101–111)
CO2 SERPL-SCNC: 28 MMOL/L (ref 22–32)
CREAT BLD-MCNC: 0.72 MG/DL (ref 0.55–1.02)
EOSINOPHIL # BLD AUTO: 0.01 X10(3) UL (ref 0–0.3)
EOSINOPHIL NFR BLD AUTO: 0.2 %
ERYTHROCYTE [DISTWIDTH] IN BLOOD BY AUTOMATED COUNT: 13.7 % (ref 11.5–16)
ETHYL ALCOHOL: 352 MG/DL (ref ?–3)
GLOBULIN PLAS-MCNC: 5.5 G/DL (ref 2.8–4.4)
GLUCOSE BLD-MCNC: 126 MG/DL (ref 70–99)
HAV IGM SER QL: 1.9 MG/DL (ref 1.8–2.5)
HCT VFR BLD AUTO: 42.7 % (ref 34–50)
HGB BLD-MCNC: 14.3 G/DL (ref 12–16)
IMMATURE GRANULOCYTE COUNT: 0.02 X10(3) UL (ref 0–1)
IMMATURE GRANULOCYTE RATIO %: 0.4 %
INR BLD: 1.74 (ref 0.9–1.1)
LYMPHOCYTES # BLD AUTO: 1.05 X10(3) UL (ref 0.9–4)
LYMPHOCYTES NFR BLD AUTO: 21.3 %
M PROTEIN MFR SERPL ELPH: 9.4 G/DL (ref 6.4–8.2)
MCH RBC QN AUTO: 34.1 PG (ref 27–33.2)
MCHC RBC AUTO-ENTMCNC: 33.5 G/DL (ref 31–37)
MCV RBC AUTO: 101.9 FL (ref 81–100)
MONOCYTES # BLD AUTO: 0.38 X10(3) UL (ref 0.1–1)
MONOCYTES NFR BLD AUTO: 7.7 %
NEUTROPHIL ABS PRELIM: 3.44 X10 (3) UL (ref 1.3–6.7)
NEUTROPHILS # BLD AUTO: 3.44 X10(3) UL (ref 1.3–6.7)
NEUTROPHILS NFR BLD AUTO: 70 %
OSMOLALITY SERPL CALC.SUM OF ELEC: 297 MOSM/KG (ref 275–295)
PLATELET # BLD AUTO: 87 10(3)UL (ref 150–450)
POTASSIUM SERPL-SCNC: 3.8 MMOL/L (ref 3.6–5.1)
PSA SERPL DL<=0.01 NG/ML-MCNC: 21 SECONDS (ref 12.4–14.7)
Q-T INTERVAL: 460 MS
QRS DURATION: 88 MS
QTC CALCULATION (BEZET): 630 MS
R AXIS: 55 DEGREES
RBC # BLD AUTO: 4.19 X10(6)UL (ref 3.8–5.1)
RED CELL DISTRIBUTION WIDTH-SD: 51.8 FL (ref 35.1–46.3)
SODIUM SERPL-SCNC: 143 MMOL/L (ref 136–144)
T AXIS: 70 DEGREES
TROPONIN I SERPL-MCNC: <0.046 NG/ML (ref ?–0.05)
TSI SER-ACNC: 5.38 MIU/ML (ref 0.35–5.5)
VENTRICULAR RATE: 113 BPM
WBC # BLD AUTO: 4.9 X10(3) UL (ref 4–13)

## 2019-01-27 PROCEDURE — 84484 ASSAY OF TROPONIN QUANT: CPT | Performed by: HOSPITALIST

## 2019-01-27 PROCEDURE — 93306 TTE W/DOPPLER COMPLETE: CPT | Performed by: INTERNAL MEDICINE

## 2019-01-27 PROCEDURE — C9113 INJ PANTOPRAZOLE SODIUM, VIA: HCPCS | Performed by: HOSPITALIST

## 2019-01-27 PROCEDURE — 71275 CT ANGIOGRAPHY CHEST: CPT | Performed by: EMERGENCY MEDICINE

## 2019-01-27 RX ORDER — LISINOPRIL 2.5 MG/1
5 TABLET ORAL NIGHTLY
Status: DISCONTINUED | OUTPATIENT
Start: 2019-01-27 | End: 2019-01-28

## 2019-01-27 RX ORDER — ACETAMINOPHEN 325 MG/1
650 TABLET ORAL EVERY 6 HOURS PRN
Status: DISCONTINUED | OUTPATIENT
Start: 2019-01-27 | End: 2019-01-28

## 2019-01-27 RX ORDER — LEVETIRACETAM 500 MG/1
500 TABLET ORAL 2 TIMES DAILY
Status: DISCONTINUED | OUTPATIENT
Start: 2019-01-27 | End: 2019-01-28

## 2019-01-27 RX ORDER — WARFARIN SODIUM 1 MG/1
1.5 TABLET ORAL
Status: DISCONTINUED | OUTPATIENT
Start: 2019-01-27 | End: 2019-01-27

## 2019-01-27 RX ORDER — MULTIPLE VITAMINS W/ MINERALS TAB 9MG-400MCG
1 TAB ORAL DAILY
Status: DISCONTINUED | OUTPATIENT
Start: 2019-01-27 | End: 2019-01-28

## 2019-01-27 RX ORDER — METOCLOPRAMIDE HYDROCHLORIDE 5 MG/ML
10 INJECTION INTRAMUSCULAR; INTRAVENOUS EVERY 8 HOURS PRN
Status: DISCONTINUED | OUTPATIENT
Start: 2019-01-27 | End: 2019-01-28

## 2019-01-27 RX ORDER — LORAZEPAM 2 MG/ML
2 INJECTION INTRAMUSCULAR
Status: DISCONTINUED | OUTPATIENT
Start: 2019-01-27 | End: 2019-01-28

## 2019-01-27 RX ORDER — ATORVASTATIN CALCIUM 40 MG/1
40 TABLET, FILM COATED ORAL NIGHTLY
Status: DISCONTINUED | OUTPATIENT
Start: 2019-01-28 | End: 2019-01-28

## 2019-01-27 RX ORDER — SODIUM PHOSPHATE, DIBASIC AND SODIUM PHOSPHATE, MONOBASIC 7; 19 G/133ML; G/133ML
1 ENEMA RECTAL ONCE AS NEEDED
Status: DISCONTINUED | OUTPATIENT
Start: 2019-01-27 | End: 2019-01-28

## 2019-01-27 RX ORDER — LORAZEPAM 2 MG/ML
1 INJECTION INTRAMUSCULAR
Status: DISCONTINUED | OUTPATIENT
Start: 2019-01-27 | End: 2019-01-28

## 2019-01-27 RX ORDER — LORAZEPAM 1 MG/1
1 TABLET ORAL
Status: DISCONTINUED | OUTPATIENT
Start: 2019-01-27 | End: 2019-01-28

## 2019-01-27 RX ORDER — ENOXAPARIN SODIUM 100 MG/ML
1 INJECTION SUBCUTANEOUS EVERY 12 HOURS SCHEDULED
Status: COMPLETED | OUTPATIENT
Start: 2019-01-27 | End: 2019-01-27

## 2019-01-27 RX ORDER — LORAZEPAM 1 MG/1
2 TABLET ORAL
Status: DISCONTINUED | OUTPATIENT
Start: 2019-01-27 | End: 2019-01-28

## 2019-01-27 RX ORDER — LISINOPRIL 2.5 MG/1
5 TABLET ORAL DAILY
Status: DISCONTINUED | OUTPATIENT
Start: 2019-01-27 | End: 2019-01-27

## 2019-01-27 RX ORDER — BISACODYL 10 MG
10 SUPPOSITORY, RECTAL RECTAL
Status: DISCONTINUED | OUTPATIENT
Start: 2019-01-27 | End: 2019-01-28

## 2019-01-27 RX ORDER — MELATONIN
100 DAILY
Status: DISCONTINUED | OUTPATIENT
Start: 2019-01-28 | End: 2019-01-28

## 2019-01-27 RX ORDER — FOLIC ACID 1 MG/1
1 TABLET ORAL DAILY
Status: DISCONTINUED | OUTPATIENT
Start: 2019-01-27 | End: 2019-01-28

## 2019-01-27 RX ORDER — POLYETHYLENE GLYCOL 3350 17 G/17G
17 POWDER, FOR SOLUTION ORAL DAILY PRN
Status: DISCONTINUED | OUTPATIENT
Start: 2019-01-27 | End: 2019-01-28

## 2019-01-27 RX ORDER — WARFARIN SODIUM 2 MG/1
3 TABLET ORAL
Status: COMPLETED | OUTPATIENT
Start: 2019-01-27 | End: 2019-01-27

## 2019-01-27 RX ORDER — METOPROLOL SUCCINATE 25 MG/1
25 TABLET, EXTENDED RELEASE ORAL NIGHTLY
Status: DISCONTINUED | OUTPATIENT
Start: 2019-01-27 | End: 2019-01-28

## 2019-01-27 RX ORDER — ONDANSETRON 2 MG/ML
4 INJECTION INTRAMUSCULAR; INTRAVENOUS EVERY 6 HOURS PRN
Status: DISCONTINUED | OUTPATIENT
Start: 2019-01-27 | End: 2019-01-28

## 2019-01-27 NOTE — ED PROVIDER NOTES
Patient Seen in: [facility] Emergency Department    History   Patient presents with:  Chest Pain Angina (cardiovascular)    Stated Complaint: chest pain, nausea    HPI    71-year-old woman with a history of coronary artery disease, history of blood Pulse 01/26/19 2342 119   Resp 01/26/19 2342 16   Temp 01/27/19 0115 98.3 °F (36.8 °C)   Temp src 01/27/19 0115 Temporal   SpO2 01/26/19 2342 97 %   O2 Device 01/26/19 2342 None (Room air)       Current:/86   Pulse 118   Temp 98.3 °F (36.8 °C) (Tem Narrative: The following orders were created for panel order CBC WITH DIFFERENTIAL WITH PLATELET.   Procedure                               Abnormality         Status                     ---------                               -----------         ------

## 2019-01-27 NOTE — PROGRESS NOTES
Admitted this 46 y/o female c/o nausea and vomitting at home , then starting having chest pain. She called EMS , was given Zofran and ASA en route. Since arrival to ER , pt. Denies chest pain. Upon pt's arrival to floor , pt.  Alert and o x 3

## 2019-01-27 NOTE — PROGRESS NOTES
[facility] Dosing Service  Warfarin (Coumadin) Initial Dosing    Anupama Nina is a 47year old female for whom pharmacy has been consulted to dose warfarin (COUMADIN) for Prophylaxis of venous thrombosis by Dr. Yoli Rutherford.   Based on this indication, vibha

## 2019-01-27 NOTE — CONSULTS
[facility]  Consultation Note      Darlene Dominguez Patient Status:  Observation    1965 MRN [id number]   [facility] 3NE-A Attending Miguel Munoz, [address] [facility] Day # 0 PCP Myriam Moses DO     Outpatient cardiologi negative. CT-A neg for PE. EtOH 352. Cardiology consultation was requested.     Medications:    Current Facility-Administered Medications:  acetaminophen (TYLENOL) tab 650 mg 650 mg Oral Q6H PRN   PEG 3350 (MIRALAX) powder packet 17 g 17 g Oral Daily unspecified(486)    • Psychiatric disorder    • Seizure disorder [facility])    • Stroke ([facility] [address] 04/2015   • Unspecified essential hypertension        Past Surgical History:   Procedure Laterality Date   • ANGIOGRAM  11/13/13    minimal CAD in the RCA   • ANGIOPLA intact; sclerae are anicteric; scalp is atrauamatic; no thyromegaly  Neck: Supple; no JVD; no carotid bruits  Cardiac: Regular rate and regular rhythm; no murmurs/rubs/gallops are appreciated; PMI is non-displaced; there is no evidence of a sternal heave

## 2019-01-27 NOTE — ED NOTES
Report given to [facility], Maggie 56 [phone] RN x [id number] at [phone]. Transport paged. Pt updated regarding plan of care.

## 2019-01-27 NOTE — PLAN OF CARE
NURSING ADMISSION NOTE      Patient admitted via Wheelchair  Oriented to room. Safety precautions initiated. Bed in low position. Call light in reach. Patient admitted from CTU 8 d/t [facility] protocol. A&O x 4. On RA. ST on tele.  Patient states her n

## 2019-01-27 NOTE — H&P
[facility] Hospitalist H&P       CC: Patient presents with:  Chest Pain Angina (cardiovascular)       PCP: Yin Sotelo DO    History of Present Illness: Patient is a 47year old female with PMH sig for CAD, CM (LVEF 35-40%) HTN, CVA, seizure disorder, ETOH a TABLET BY MOUTH DAILY Disp: 30 tablet Rfl: 2   SERTRALINE HCL 50 MG Oral Tab TAKE ONE TABLET BY MOUTH DAILY Disp: 30 tablet Rfl: 2   B-1 100 MG Oral Tab TAKE ONE TABLET BY MOUTH DAILY Disp: 90 tablet Rfl: 0   Thiamine HCl (B-1) 100 MG Oral Tab Take 1 table Lungs:   Clear to auscultation bilaterally. Normal effort   Chest wall:  No tenderness or deformity. Heart:  Regular rate and rhythm, S1, S2 normal, no murmur, rub or gallop appreciated   Abdomen:   Soft, non-tender.  Bowel sounds normal. No masses,  No MD            Cta Chest For Pulmonary Embolism (cpt=71275)(cs)    Result Date: 1/27/2019  PROCEDURE:  CTA CHEST FOR PULMONARY EMBOLISM (CPT=71275)(CS)  COMPARISON:  None.   INDICATIONS:  chest pain, nausea  TECHNIQUE:  IV contrast-enhanced multislice CT ang only 1.74, thus will give therapeutic lovenox dose through today     CAD, CM, HTN, HL  -LVEF 35-40% per echo from 7/2018  -cont statin  -cont lisinopril  -unclear why not on ASA/BB    ETOH abuse  -monitor for withdrawel via CIWA  -folate, thiamine, multivi

## 2019-01-27 NOTE — ED INITIAL ASSESSMENT (HPI)
Pt c/o midsternal chest pain that started around 2300, pt also vomited 3 times. Pt was given aspirin and zofran in the field.        Pt denies any chest pain at this time

## 2019-01-27 NOTE — ED NOTES
[facility] [address] [place] [address]. Patient care assumed at this time.

## 2019-01-28 VITALS
SYSTOLIC BLOOD PRESSURE: 136 MMHG | HEART RATE: 78 BPM | WEIGHT: 114 LBS | HEIGHT: 62 IN | OXYGEN SATURATION: 99 % | DIASTOLIC BLOOD PRESSURE: 85 MMHG | RESPIRATION RATE: 20 BRPM | BODY MASS INDEX: 20.98 KG/M2 | TEMPERATURE: 98 F

## 2019-01-28 LAB
INR BLD: 1.73 (ref 0.9–1.1)
PSA SERPL DL<=0.01 NG/ML-MCNC: 20.9 SECONDS (ref 12.4–14.7)

## 2019-01-28 PROCEDURE — 85610 PROTHROMBIN TIME: CPT | Performed by: HOSPITALIST

## 2019-01-28 PROCEDURE — 90471 IMMUNIZATION ADMIN: CPT

## 2019-01-28 PROCEDURE — C9113 INJ PANTOPRAZOLE SODIUM, VIA: HCPCS | Performed by: HOSPITALIST

## 2019-01-28 RX ORDER — ENOXAPARIN SODIUM 100 MG/ML
50 INJECTION SUBCUTANEOUS ONCE
Status: COMPLETED | OUTPATIENT
Start: 2019-01-28 | End: 2019-01-28

## 2019-01-28 RX ORDER — ENOXAPARIN SODIUM 100 MG/ML
1 INJECTION SUBCUTANEOUS EVERY 12 HOURS SCHEDULED
Status: DISCONTINUED | OUTPATIENT
Start: 2019-01-28 | End: 2019-01-28

## 2019-01-28 RX ORDER — METOPROLOL SUCCINATE 25 MG/1
25 TABLET, EXTENDED RELEASE ORAL NIGHTLY
Qty: 90 TABLET | Refills: 3 | Status: SHIPPED | OUTPATIENT
Start: 2019-01-28 | End: 2020-03-03

## 2019-01-28 RX ORDER — ENOXAPARIN SODIUM 100 MG/ML
1 INJECTION SUBCUTANEOUS EVERY 12 HOURS SCHEDULED
Qty: 4 SYRINGE | Refills: 0 | Status: SHIPPED | OUTPATIENT
Start: 2019-01-28 | End: 2019-02-01

## 2019-01-28 RX ORDER — WARFARIN SODIUM 3 MG/1
3 TABLET ORAL NIGHTLY
Qty: 30 TABLET | Refills: 0 | Status: SHIPPED | OUTPATIENT
Start: 2019-01-28 | End: 2019-04-10 | Stop reason: ALTCHOICE

## 2019-01-28 RX ORDER — WARFARIN SODIUM 2 MG/1
3 TABLET ORAL
Status: DISCONTINUED | OUTPATIENT
Start: 2019-01-28 | End: 2019-01-28

## 2019-01-28 RX ORDER — PANTOPRAZOLE SODIUM 40 MG/1
40 TABLET, DELAYED RELEASE ORAL
Qty: 30 TABLET | Refills: 0 | Status: SHIPPED | OUTPATIENT
Start: 2019-01-28 | End: 2019-05-01

## 2019-01-28 RX ORDER — PANTOPRAZOLE SODIUM 40 MG/1
40 TABLET, DELAYED RELEASE ORAL
Status: DISCONTINUED | OUTPATIENT
Start: 2019-01-28 | End: 2019-01-28

## 2019-01-28 RX ORDER — LISINOPRIL 5 MG/1
5 TABLET ORAL NIGHTLY
Qty: 90 TABLET | Refills: 3 | Status: SHIPPED | OUTPATIENT
Start: 2019-01-28 | End: 2020-03-03

## 2019-01-28 NOTE — DISCHARGE SUMMARY
General Medicine Discharge Summary     Patient ID:  Benny Meza  47year old  5/5/1965    Admit date: 1/26/2019    Discharge date and time:  1/28/19    Attending Physician: No att. providers found further instructions on coumadin/lovenox pending INR value (per coumadin clinic)     CAD, CM, HTN, HL  -LVEF 35-40% per echo from 7/2018- repeat lokos OK  -cont statin  -cont lisinopril     ETOH abuse  -folate, thiamine, multivitamin  -seizure precautions 01/28/19  1010   BP: 136/85   Pulse: 78   Resp: 20   Temp: 98.3 °F (36.8 °C)       General: no acute distress, alert and oriented x 3  Heart: RRR  Lungs: clear bilaterally, no active wheezing  Abdomen: nontender, nondistended, intact BS  Extremities: no pe

## 2019-01-28 NOTE — PROGRESS NOTES
[facility] Cardiology Progress Note        Ana Maria Hendrickson Patient Status:  Observation    1965 MRN [id number]   [facility] 3NE-A Attending Breana Brand DO   Hosp Day # 0 PCP Julissa Escobedo DO     Subjective: GLU  126*       Recent Labs   Lab  01/26/19   2354   ALT  48   AST  134*   ALB  3.9       Recent Labs   Lab  01/26/19   2354  01/27/19   0346  01/27/19   0954   TROP  <0.046  <0.046  <0.046       Recent Labs   Lab  01/25/19   1514  01/26/19 2354  01/28

## 2019-01-28 NOTE — PLAN OF CARE
Discharge instructions given and discussed with patient/spouse. Verbalized understanding. Declined lovenox education, per  have done it before and feel comfortable at home with injections.

## 2019-01-28 NOTE — PROGRESS NOTES
Pharmacy Dosing Service: Warfarin (Coumadin)  Olinda Pickett is a 47year old female with history of DVT (on outpatient coumadin) for whom pharmacy has been dosing warfarin (Coumadin) per consult from Dr. Maggie Pearson on 1/27. Goal INR is 2-3.     Recent Lab

## 2019-01-28 NOTE — PLAN OF CARE
CARDIOVASCULAR - ADULT    • Maintains optimal cardiac output and hemodynamic stability Adequate for Discharge    • Absence of cardiac arrhythmias or at baseline Adequate for Discharge              Assumed pt care at 0730. Aa/ox4. Breathing unlabored.  Abel Musca

## 2019-01-28 NOTE — BH PROGRESS NOTE
Went to see the pt and she was getting ready for d/c. Tried to talk with her about her etoh history. Pt states she is not interested in the [facility] level of care assessment.   She said, she is not planning on stopping her etoh intake but to cut down drinking

## 2019-01-28 NOTE — PROGRESS NOTES
[facility] Pharmacy Note: Route Optimization for Pantoprazole (PROTONIX)    Patient is currently on Pantoprazole (PROTONIX) 40 mg IV twice daily  The patient meets the criteria to convert to the oral equivalent as established by the IV to Oral conversion protocol

## 2019-01-31 PROBLEM — I25.10 CORONARY ARTERY DISEASE INVOLVING NATIVE CORONARY ARTERY OF NATIVE HEART WITHOUT ANGINA PECTORIS: Status: ACTIVE | Noted: 2019-01-31

## 2019-01-31 PROBLEM — E78.5 HYPERLIPIDEMIA: Status: ACTIVE | Noted: 2019-01-31

## 2019-01-31 PROBLEM — I42.6 ALCOHOLIC CARDIOMYOPATHY (HCC): Status: ACTIVE | Noted: 2019-01-31

## 2019-01-31 PROBLEM — I50.22 CHRONIC SYSTOLIC (CONGESTIVE) HEART FAILURE (HCC): Status: ACTIVE | Noted: 2018-08-29

## 2019-02-01 ENCOUNTER — LAB ENCOUNTER (OUTPATIENT)
Dept: LAB | Facility: HOSPITAL | Age: 54
End: 2019-02-01
Attending: INTERNAL MEDICINE
Payer: COMMERCIAL

## 2019-02-01 DIAGNOSIS — I63.331 CEREBRAL INFARCTION DUE TO THROMBOSIS OF RIGHT POSTERIOR CEREBRAL ARTERY (HCC): ICD-10-CM

## 2019-02-01 DIAGNOSIS — Z79.01 LONG TERM (CURRENT) USE OF ANTICOAGULANTS: ICD-10-CM

## 2019-02-01 LAB
INR BLD: 1.3 (ref 0.9–1.1)
PSA SERPL DL<=0.01 NG/ML-MCNC: 16.7 SECONDS (ref 12.4–14.7)

## 2019-02-01 PROCEDURE — 36415 COLL VENOUS BLD VENIPUNCTURE: CPT

## 2019-02-01 PROCEDURE — 85610 PROTHROMBIN TIME: CPT

## 2019-02-04 ENCOUNTER — LAB ENCOUNTER (OUTPATIENT)
Dept: LAB | Facility: HOSPITAL | Age: 54
End: 2019-02-04
Attending: INTERNAL MEDICINE
Payer: COMMERCIAL

## 2019-02-04 DIAGNOSIS — Z79.01 LONG TERM (CURRENT) USE OF ANTICOAGULANTS: ICD-10-CM

## 2019-02-04 DIAGNOSIS — I63.331 CEREBRAL INFARCTION DUE TO THROMBOSIS OF RIGHT POSTERIOR CEREBRAL ARTERY (HCC): ICD-10-CM

## 2019-02-04 LAB
INR BLD: 2.09 (ref 0.9–1.1)
PSA SERPL DL<=0.01 NG/ML-MCNC: 24.2 SECONDS (ref 12.4–14.7)

## 2019-02-04 PROCEDURE — 85610 PROTHROMBIN TIME: CPT

## 2019-02-04 PROCEDURE — 36415 COLL VENOUS BLD VENIPUNCTURE: CPT

## 2019-02-19 PROBLEM — Z09 HOSPITAL DISCHARGE FOLLOW-UP: Status: ACTIVE | Noted: 2019-02-19

## 2019-03-04 RX ORDER — ATORVASTATIN CALCIUM 40 MG/1
TABLET, FILM COATED ORAL
Qty: 30 TABLET | Refills: 0 | Status: SHIPPED | OUTPATIENT
Start: 2019-03-04 | End: 2019-04-01

## 2019-03-04 NOTE — TELEPHONE ENCOUNTER
Medication: Atorvastatin 40 mg tab    Date of last refill: 08/14/2018 (#90/1)  [place]    Last office visit: 10/15/2018  Due back to clinic per last office note:  3 months  Date next office visit scheduled:    Future

## 2019-04-01 DIAGNOSIS — E78.2 MIXED HYPERLIPIDEMIA: Primary | ICD-10-CM

## 2019-04-01 RX ORDER — ATORVASTATIN CALCIUM 40 MG/1
TABLET, FILM COATED ORAL
Qty: 30 TABLET | Refills: 0 | Status: SHIPPED | OUTPATIENT
Start: 2019-04-01 | End: 2019-05-03

## 2019-04-01 NOTE — TELEPHONE ENCOUNTER
Medication: ATORVASTATIN 40 MG Oral Tab    Date of last refill: 3/4/19 (#30/0)  [place]     Last office visit: 10/15/2018  Due back to clinic per last office note:  3 months  Date next office visit scheduled:    Future A

## 2019-04-10 ENCOUNTER — OFFICE VISIT (OUTPATIENT)
Dept: NEUROLOGY | Facility: CLINIC | Age: 54
End: 2019-04-10
Payer: COMMERCIAL

## 2019-04-10 VITALS
HEART RATE: 84 BPM | WEIGHT: 116 LBS | DIASTOLIC BLOOD PRESSURE: 82 MMHG | RESPIRATION RATE: 18 BRPM | SYSTOLIC BLOOD PRESSURE: 102 MMHG | BODY MASS INDEX: 21 KG/M2

## 2019-04-10 DIAGNOSIS — R26.81 UNSTEADY GAIT: ICD-10-CM

## 2019-04-10 DIAGNOSIS — I63.331 CEREBRAL INFARCTION DUE TO THROMBOSIS OF RIGHT POSTERIOR CEREBRAL ARTERY (HCC): Primary | ICD-10-CM

## 2019-04-10 PROCEDURE — 99213 OFFICE O/P EST LOW 20 MIN: CPT | Performed by: OTHER

## 2019-04-10 NOTE — PROGRESS NOTES
Neurology H&P    Noah Anderson Patient Status:  No patient class for patient encounter    1965 MRN [id number]   Location [address] [place] [address], [address] [place] [address] Drive, [address] [facility] Attending No att. 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 2.  Stable encephalomalacia from old infarct in the right parietal lobe. 3. There are increased mild to moderate age-indeterminate microvascular ischemic changes in the cerebral white matter. Consider MRI for further evaluation.     CTA evidence for lepto-meningeal back filling of distal PCA branches.   There is some early AV shunting through the medial temporal   lobe, likely related to reperfusion from partial dissolution and distal migration of the previously more proximally occlusive e microhemorrhage and/or focal mineralization. 4. There is encephalomalacia in the right parietal lobe from old infarct that also demonstrates minimal changes of laminar necrosis.      5. Mild to moderate chronic microvascular ischemic changes in the ce

## 2019-04-23 ENCOUNTER — APPOINTMENT (OUTPATIENT)
Dept: GENERAL RADIOLOGY | Facility: HOSPITAL | Age: 54
DRG: 100 | End: 2019-04-23
Attending: EMERGENCY MEDICINE
Payer: COMMERCIAL

## 2019-04-23 ENCOUNTER — APPOINTMENT (OUTPATIENT)
Dept: CT IMAGING | Facility: HOSPITAL | Age: 54
DRG: 100 | End: 2019-04-23
Attending: EMERGENCY MEDICINE
Payer: COMMERCIAL

## 2019-04-23 ENCOUNTER — HOSPITAL ENCOUNTER (INPATIENT)
Facility: HOSPITAL | Age: 54
LOS: 4 days | Discharge: HOME | DRG: 100 | End: 2019-04-27
Attending: EMERGENCY MEDICINE | Admitting: INTERNAL MEDICINE
Payer: COMMERCIAL

## 2019-04-23 DIAGNOSIS — I63.50 CEREBRAL ARTERY OCCLUSION WITH CEREBRAL INFARCTION (HCC): ICD-10-CM

## 2019-04-23 DIAGNOSIS — R29.898 LEFT ARM WEAKNESS: ICD-10-CM

## 2019-04-23 DIAGNOSIS — G40.901 STATUS EPILEPTICUS (HCC): Primary | ICD-10-CM

## 2019-04-23 PROBLEM — F10.20 ALCOHOLIC ENCEPHALOPATHY (HCC): Status: ACTIVE | Noted: 2019-04-23

## 2019-04-23 PROBLEM — G31.2 ALCOHOLIC ENCEPHALOPATHY (HCC): Status: ACTIVE | Noted: 2019-04-23

## 2019-04-23 PROBLEM — K72.90 ENCEPHALOPATHY, HEPATIC (HCC): Status: ACTIVE | Noted: 2019-04-23

## 2019-04-23 PROBLEM — N17.9 ACUTE KIDNEY INJURY (HCC): Status: ACTIVE | Noted: 2019-04-23

## 2019-04-23 PROBLEM — R73.9 HYPERGLYCEMIA: Status: ACTIVE | Noted: 2019-04-23

## 2019-04-23 PROBLEM — E87.2 METABOLIC ACIDOSIS: Status: ACTIVE | Noted: 2019-04-23

## 2019-04-23 PROBLEM — N17.9 ACUTE KIDNEY INJURY: Status: ACTIVE | Noted: 2019-04-23

## 2019-04-23 PROBLEM — G31.2 ALCOHOLIC ENCEPHALOPATHY: Status: ACTIVE | Noted: 2019-04-23

## 2019-04-23 PROBLEM — E87.20 METABOLIC ACIDOSIS: Status: ACTIVE | Noted: 2019-04-23

## 2019-04-23 PROBLEM — E87.3 METABOLIC ALKALOSIS: Status: ACTIVE | Noted: 2019-04-23

## 2019-04-23 PROBLEM — K76.82 ENCEPHALOPATHY, HEPATIC (HCC): Status: ACTIVE | Noted: 2019-04-23

## 2019-04-23 PROBLEM — K76.82 ENCEPHALOPATHY, HEPATIC: Status: ACTIVE | Noted: 2019-04-23

## 2019-04-23 PROCEDURE — 74176 CT ABD & PELVIS W/O CONTRAST: CPT | Performed by: EMERGENCY MEDICINE

## 2019-04-23 PROCEDURE — 0BH18EZ INSERTION OF ENDOTRACHEAL AIRWAY INTO TRACHEA, VIA NATURAL OR ARTIFICIAL OPENING ENDOSCOPIC: ICD-10-PCS | Performed by: EMERGENCY MEDICINE

## 2019-04-23 PROCEDURE — 5A1945Z RESPIRATORY VENTILATION, 24-96 CONSECUTIVE HOURS: ICD-10-PCS | Performed by: EMERGENCY MEDICINE

## 2019-04-23 PROCEDURE — 70496 CT ANGIOGRAPHY HEAD: CPT | Performed by: EMERGENCY MEDICINE

## 2019-04-23 PROCEDURE — 99291 CRITICAL CARE FIRST HOUR: CPT | Performed by: NURSE PRACTITIONER

## 2019-04-23 PROCEDURE — 70450 CT HEAD/BRAIN W/O DYE: CPT | Performed by: EMERGENCY MEDICINE

## 2019-04-23 PROCEDURE — 70498 CT ANGIOGRAPHY NECK: CPT | Performed by: EMERGENCY MEDICINE

## 2019-04-23 PROCEDURE — 71045 X-RAY EXAM CHEST 1 VIEW: CPT | Performed by: EMERGENCY MEDICINE

## 2019-04-23 RX ORDER — METOCLOPRAMIDE HYDROCHLORIDE 5 MG/ML
10 INJECTION INTRAMUSCULAR; INTRAVENOUS EVERY 8 HOURS PRN
Status: DISCONTINUED | OUTPATIENT
Start: 2019-04-23 | End: 2019-04-23

## 2019-04-23 RX ORDER — METOCLOPRAMIDE HYDROCHLORIDE 5 MG/ML
10 INJECTION INTRAMUSCULAR; INTRAVENOUS EVERY 8 HOURS PRN
Status: DISCONTINUED | OUTPATIENT
Start: 2019-04-23 | End: 2019-04-27

## 2019-04-23 RX ORDER — ONDANSETRON 2 MG/ML
4 INJECTION INTRAMUSCULAR; INTRAVENOUS EVERY 4 HOURS PRN
Status: DISCONTINUED | OUTPATIENT
Start: 2019-04-23 | End: 2019-04-27

## 2019-04-23 RX ORDER — MIDAZOLAM HYDROCHLORIDE 1 MG/ML
2 INJECTION INTRAMUSCULAR; INTRAVENOUS EVERY 5 MIN PRN
Status: DISCONTINUED | OUTPATIENT
Start: 2019-04-23 | End: 2019-04-25

## 2019-04-23 RX ORDER — ACETAMINOPHEN 325 MG/1
650 TABLET ORAL EVERY 6 HOURS PRN
Status: DISCONTINUED | OUTPATIENT
Start: 2019-04-23 | End: 2019-04-23

## 2019-04-23 RX ORDER — FAMOTIDINE 10 MG/ML
20 INJECTION, SOLUTION INTRAVENOUS 2 TIMES DAILY
Status: DISCONTINUED | OUTPATIENT
Start: 2019-04-23 | End: 2019-04-26

## 2019-04-23 RX ORDER — ACETAMINOPHEN 650 MG/1
650 SUPPOSITORY RECTAL EVERY 6 HOURS PRN
Status: DISCONTINUED | OUTPATIENT
Start: 2019-04-23 | End: 2019-04-23

## 2019-04-23 RX ORDER — POLYETHYLENE GLYCOL 3350 17 G/17G
17 POWDER, FOR SOLUTION ORAL DAILY PRN
Status: DISCONTINUED | OUTPATIENT
Start: 2019-04-23 | End: 2019-04-26

## 2019-04-23 RX ORDER — MAGNESIUM SULFATE HEPTAHYDRATE 40 MG/ML
2 INJECTION, SOLUTION INTRAVENOUS ONCE
Status: COMPLETED | OUTPATIENT
Start: 2019-04-23 | End: 2019-04-23

## 2019-04-23 RX ORDER — SODIUM PHOSPHATE, DIBASIC AND SODIUM PHOSPHATE, MONOBASIC 7; 19 G/133ML; G/133ML
1 ENEMA RECTAL ONCE AS NEEDED
Status: DISCONTINUED | OUTPATIENT
Start: 2019-04-23 | End: 2019-04-26

## 2019-04-23 RX ORDER — METOPROLOL TARTRATE 5 MG/5ML
5 INJECTION INTRAVENOUS ONCE
Status: COMPLETED | OUTPATIENT
Start: 2019-04-24 | End: 2019-04-23

## 2019-04-23 RX ORDER — LORAZEPAM 2 MG/ML
3 INJECTION INTRAMUSCULAR EVERY 30 MIN PRN
Status: DISCONTINUED | OUTPATIENT
Start: 2019-04-23 | End: 2019-04-27

## 2019-04-23 RX ORDER — POTASSIUM CHLORIDE 14.9 MG/ML
20 INJECTION INTRAVENOUS ONCE
Status: COMPLETED | OUTPATIENT
Start: 2019-04-23 | End: 2019-04-24

## 2019-04-23 RX ORDER — LORAZEPAM 2 MG/ML
1 INJECTION INTRAMUSCULAR ONCE
Status: DISCONTINUED | OUTPATIENT
Start: 2019-04-23 | End: 2019-04-27

## 2019-04-23 RX ORDER — LORAZEPAM 2 MG/ML
4 INJECTION INTRAMUSCULAR
Status: DISCONTINUED | OUTPATIENT
Start: 2019-04-23 | End: 2019-04-27

## 2019-04-23 RX ORDER — SODIUM CHLORIDE 9 MG/ML
INJECTION, SOLUTION INTRAVENOUS CONTINUOUS
Status: DISCONTINUED | OUTPATIENT
Start: 2019-04-23 | End: 2019-04-23

## 2019-04-23 RX ORDER — SODIUM CHLORIDE 9 MG/ML
INJECTION, SOLUTION INTRAVENOUS CONTINUOUS
Status: DISCONTINUED | OUTPATIENT
Start: 2019-04-23 | End: 2019-04-26

## 2019-04-23 RX ORDER — BISACODYL 10 MG
10 SUPPOSITORY, RECTAL RECTAL
Status: DISCONTINUED | OUTPATIENT
Start: 2019-04-23 | End: 2019-04-25

## 2019-04-23 RX ORDER — DEXMEDETOMIDINE HYDROCHLORIDE 4 UG/ML
INJECTION, SOLUTION INTRAVENOUS CONTINUOUS PRN
Status: DISCONTINUED | OUTPATIENT
Start: 2019-04-23 | End: 2019-04-25

## 2019-04-23 RX ORDER — LORAZEPAM 2 MG/ML
2 INJECTION INTRAMUSCULAR EVERY 30 MIN PRN
Status: DISCONTINUED | OUTPATIENT
Start: 2019-04-23 | End: 2019-04-27

## 2019-04-23 RX ORDER — LORAZEPAM 2 MG/ML
1 INJECTION INTRAMUSCULAR
Status: DISCONTINUED | OUTPATIENT
Start: 2019-04-23 | End: 2019-04-27

## 2019-04-23 RX ORDER — ETOMIDATE 2 MG/ML
INJECTION INTRAVENOUS
Status: COMPLETED | OUTPATIENT
Start: 2019-04-23 | End: 2019-04-23

## 2019-04-23 RX ORDER — ACETAMINOPHEN 160 MG/5ML
650 SOLUTION ORAL EVERY 6 HOURS PRN
Status: DISCONTINUED | OUTPATIENT
Start: 2019-04-23 | End: 2019-04-23

## 2019-04-23 RX ORDER — LORAZEPAM 2 MG/ML
1 INJECTION INTRAMUSCULAR ONCE
Status: COMPLETED | OUTPATIENT
Start: 2019-04-23 | End: 2019-04-23

## 2019-04-23 RX ORDER — CHLORHEXIDINE GLUCONATE 0.12 MG/ML
15 RINSE ORAL
Status: DISCONTINUED | OUTPATIENT
Start: 2019-04-24 | End: 2019-04-25

## 2019-04-23 NOTE — ED PROVIDER NOTES
Patient Seen in: [facility] Emergency Department    History   Patient presents with:  Altered Mental Status (neurologic)    Stated Complaint: AMS    HPI  This is a 60-year-old female who family states that her  called about 5-5 30 she was not years: 7.5        Quit date: 11/3/1996        Years since quittin.4      Smokeless tobacco: Never Used    Alcohol use:  Yes      Alcohol/week: 1.2 oz      Types: 2 Cans of beer per week      Binge frequency: Daily or almost daily      Comment: 2 cans b Non- 55 (*)      (*)      (*)     Alkaline Phosphatase 284 (*)     Bilirubin, Total 4.0 (*)     Total Protein 8.8 (*)     Globulin  5.4 (*)     A/G Ratio 0.6 (*)     All other components within normal limits   ETHYL ALCOHOL - monitors, IV was started, blood was drawn. MDM   The EKG shows sinus tachycardia, poor R wave progression nonspecific ST changes diffuse ST ST changes laterally. There is no acute ST elevations.   The rest of the EKG including rate rhythm axis and int withdrawal seizures, she has had a previous history of stroke may be due to some scar tissue but there is significant risk that this may be a withdrawal seizures. CBC was within normal limits. Patient was given IV fluids.   The lactic acid was elevated on renal collecting system. CONCLUSION:  Endotracheal tube tip 2.5 cm from the traci. No lobar pneumonia or overt congestive failure.     Dictated by: Jey Toro MD on 4/23/2019 at 19:54     Approved by: Jey Toro MD            Ct Stroke [facility] Date: 4/23/2019  PROCEDURE:  CT STROKE CTA BRAIN/CTA NECK (W IV)(CPT=70496/49367)  COMPARISON:  [facility] CT STROKE CTA BRAIN/CTA NECK (W IV)(CPT=70496/92049), 7/16/2018, 14:55.   INDICATIONS:  AMS  TECHNIQUE  Noncontrast CT scanning is performed through the hemodynamically significant stenosis in the carotid bulbs by NASCET criteria. The cervical internal carotid arteries are widely patent. The vertebral arteries originate from the subclavian arteries. The origins of the vertebral arteries are patent.   The and lateral views of the chest. FINDINGS: The lungs are clear. The heart, mediastinum and pleura are unremarkable. The osseous structures are intact. IMPRESSION: No acute cardiopulmonary process.     Xr Ribs, Unilateral (2 Views), Left (cpt=71100)    Res Dose Index Registry. PATIENT STATED HISTORY:(As transcribed by Technologist)  Patient had two seizures and not responsive. FINDINGS: Stable mild to moderate global brain parenchymal volume loss without overt hydrocephalus. .  There is no midline shift or Dose Index Registry. PATIENT STATED HISTORY:(As transcribed by Technologist)  Patient had two seizures and is currently non-responsive.    CONTRAST USED:  75cc of Omnipaque 350  FINDINGS:  The upper cervical, petrous, cavernous, and supraclinoid internal c chest. FINDINGS: The lungs are clear. The heart, mediastinum and pleura are unremarkable. The osseous structures are intact. IMPRESSION: No acute cardiopulmonary process.     Xr Ribs, Unilateral (2 Views), Left (cpt=71100)    Result Date: 4/14/2019  DATE PATIENT STATED HISTORY:(As transcribed by Technologist)  Patient had two seizures and not responsive. FINDINGS: Stable mild to moderate global brain parenchymal volume loss without overt hydrocephalus. .  There is no midline shift or mass-effect.   The bas 4/23/2019 Yes    Encephalopathy, HEPATIC K72.90 4/23/2019 Yes    Hyperglycemia R73.9 9/79/9757 Yes    Metabolic acidosis F49.9 9/27/0573 Yes    Metabolic alkalosis U37.6 7/87/2286 Yes    [facility] G40.901 4/23/2019 Unknown

## 2019-04-23 NOTE — ED INITIAL ASSESSMENT (HPI)
Patient to ED via EMS. Reports she called her  around 5 or 5:30 and reports she wasn't feeling well all day, vomiting this morning.  + full body shaking and clenching when  arrived.    Patient answering all questions appropriately upon EMS ar

## 2019-04-24 ENCOUNTER — APPOINTMENT (OUTPATIENT)
Dept: ULTRASOUND IMAGING | Facility: HOSPITAL | Age: 54
DRG: 100 | End: 2019-04-24
Attending: INTERNAL MEDICINE
Payer: COMMERCIAL

## 2019-04-24 ENCOUNTER — APPOINTMENT (OUTPATIENT)
Dept: GENERAL RADIOLOGY | Facility: HOSPITAL | Age: 54
DRG: 100 | End: 2019-04-24
Attending: NURSE PRACTITIONER
Payer: COMMERCIAL

## 2019-04-24 PROCEDURE — 76700 US EXAM ABDOM COMPLETE: CPT | Performed by: INTERNAL MEDICINE

## 2019-04-24 PROCEDURE — 71045 X-RAY EXAM CHEST 1 VIEW: CPT | Performed by: NURSE PRACTITIONER

## 2019-04-24 PROCEDURE — 99291 CRITICAL CARE FIRST HOUR: CPT | Performed by: OTHER

## 2019-04-24 PROCEDURE — 95819 EEG AWAKE AND ASLEEP: CPT | Performed by: OTHER

## 2019-04-24 RX ORDER — LACTULOSE 10 G/15ML
20 SOLUTION ORAL 3 TIMES DAILY
Status: DISCONTINUED | OUTPATIENT
Start: 2019-04-24 | End: 2019-04-24

## 2019-04-24 NOTE — CONSULTS
GASTROENTEROLOGY CONSULTATION  Felicita Conner MD    Department of Gastroenterology  [address] [place] [address] Patient Status:  Inpatient    1965 MRN [id number]   [facility] 6NE-A Attending Amaury May, [address] [facility] appearance. .  SPLEEN:  Normal.  No enlargement or focal lesion. PANCREAS:  No nic-pancreatic inflammatory stranding  ADRENALS:  Normal.  No mass or enlargement. KIDNEYS:  The kidneys are symmetric in size and shape without evidence of hydronephrosis. history. She has never used smokeless tobacco. She reports that she drinks about 1.2 oz of alcohol per week. She reports that she does not use drugs. Allergies:    Penicillins             RASH    Comment:Once when a child.  Got a dose as an adult with no Chlorhexidine Gluconate (PERIDEX) 0.12 % solution 15 mL, 15 mL, Mouth/Throat, [email or web address]  •  Midazolam HCl (VERSED) 2 MG/2ML injection 2 mg, 2 mg, Intravenous, Q5 Min PRN  •  propofol (DIPRIVAN) infusion, 5-100 mcg/kg/min, Intravenous, Continuous  •  fa masses. Bowel sounds are present. Back: No CVA tenderness. Extremities: No edema, cyanosis, or clubbing. Skin: Warm and dry. Rectal: deferred  Laboratory Data:  Lab Results   Component Value Date    WBC 6.1 04/24/2019    HGB 13.8 04/24/2019    HCT 37. chronic liver diseases. Screening for colon cancer and esophageal varices once stable. Plan:  1. Lactulose enema to treat elevated ammonia. 2. Will check labs to assess for chronic and acute liver disease. Will check tylenol level.   3. Outpt egd and

## 2019-04-24 NOTE — PROCEDURES
[facility] - ELECTROENCEPHALOGRAM (EEG) REPORT  Patient Name:  [facility]   MRN / CSN:  [id number] / [id number]   Date of Birth / Age:  5/5/1965 /  47year old   Encounter Date:  4/24/19         METHODS:  Twenty-two electrodes were applied according to the

## 2019-04-24 NOTE — CONSULTS
[facility]  Neurocritical Care       Name: Jakub Schneider  MRN: [id number]  Admission Date/Time: 4/23/2019  6:02 PM  Primary Care Provider:  Ousmane Byrne DO        Reason for Consultation:   Status Epilepticus    HPI:   Patient is 99/89/5565      Alcoholic cardiomyopathy ([facility] [address]         Date Noted: 01/31/2019      Coronary artery disease involving native coronary artery of native heart without angina pectoris         Date Noted: 01/31/2019      Hyperlipidemia         Date Noted: 01/31 Laterality Date   • ANGIOGRAM  13    minimal CAD in the RCA   • ANGIOPLASTY (CORONARY)  12    LAD-thrombotic event   •      • IR ANGIOGRAM CEREBRAL CAROTID BILATERAL  2018              Medications Prior to Admission:  ATORVASTATIN Yes        Alcohol/week: 1.2 oz        Types: 2 Cans of beer per week        Binge frequency: Daily or almost daily        Comment: 2 cans beer/day      Drug use: No      Sexual activity: Yes        Partners: Male    Other Topics      Concerns:        Caff suppository 10 mg 10 mg Rectal Daily PRN   FLEET ENEMA (FLEET) 7-19 GM/118ML enema 133 mL 1 enema Rectal Once PRN   Chlorhexidine Gluconate (PERIDEX) 0.12 % solution 15 mL 15 mL Mouth/Throat Travis@[employer]   Midazolam HCl (VERSED) 2 MG/2ML injection 2 mg 2 Left (cpt=71100)    Result Date: 4/14/2019  DATE OF SERVICE: 04.14.2019 LEFT RIBS CLINICAL INFORMATION: Pain, unspecified COMPARISON STUDY: None. TECHNIQUE: AP upper and lower ribs, & oblique upper and lower ribs, 4 views.  FINDINGS: The visualized lung is BONES:  Anterolisthesis of L5 on S1 with bilateral pars defects. Degenerative changes greatest at the L5-S1 level. CONCLUSION:  1. No acute findings. 2. Colonic diverticulosis without evidence of diverticulitis. 3. Hepatic steatosis.     Dictated by: 4/23/2019  PROCEDURE:  CT STROKE PROTOCOL (NO CONTRAST) (CPT=70450)  COMPARISON:  [facility] , CT BRAIN OR HEAD (19050), 7/23/2018, 11:02. INDICATIONS:  AMS  TECHNIQUE:  Noncontrast CT scanning is performed through the brain.   Dose reduction techniques were u and neck were obtained with non-ionic contrast. MIP images were obtained. Curved planar reformats were performed through the carotid and vertebral arteries. All measurements obtained in this exam were performed using NASCET.  Dose reduction techniques were The right vertebral artery is dominant. Degenerative changes in the spine. Mildly heterogenous thyroid gland. Scarring/atelectasis in the upper lungs. CONCLUSION:   1.  No evidence of intracranial aneurysm, flow-limiting stenosis, or focal arterial contributing to reduced seizure threshold) vs hepatic dysfunction (less likely)  - Given 1000mg IV Keppra in ED  - Continue Kepra 500mg bID  - continue seizure precautions  - EEG showing background suppression and intermittent beta fast activity but no ict vented with pulmonology on consult  C: RASS -4 (propofol)  D: CAM ICU: unable to assess  E: PT/OT when able  F: Attempted to call  for update. No answer and VM was left      Goals of the Day:  Wean Propofol, extubate if able, continue seizure precaut

## 2019-04-24 NOTE — RESPIRATORY THERAPY NOTE
Received pt from the ER vented, VC+ 18/450/40%/+5. Post ABG results VC+ 16/450/40%/+5. Will continue to monitor closely.

## 2019-04-24 NOTE — RESPIRATORY THERAPY NOTE
INTERVENTIONS:  - Assess for changes in respiratory status  - Assess for changes in mentation and behavior  - Position to facilitate oxygenation and minimize respiratory effort  - Oxygen supplementation based on oxygen saturation or ABGs  - Provide Smoking

## 2019-04-24 NOTE — PLAN OF CARE
Patient VSS. Issues with HR in 140's, Lopressor PRN given per APN. Neuro Q4hr with no issues with seizure activity, most likely alcohol withdrawal.   Intubated with propofol gtt, some irritability throughout the night.   Plan of care updated with

## 2019-04-24 NOTE — PROGRESS NOTES
[facility]  Neurocritical Care APRN Progress Note    NAME: Jesus Hutchinson - ROOM: 6267/-O - MRN: [id number] - Age: 47year old - :1965    History Of Present Illness:  Jesus Hutchinson is a 47year old female with PMHx si Vascular Surgery Progress Note    Admit Date: 2017  POD * No surgery found *    Procedure:  * No surgery found *      Subjective:     Patient has no new complaints. Objective:     Blood pressure 161/61, pulse 62, temperature 97.5 °F (36.4 °C), resp. rate 18, height 6' (1.829 m), weight 170 lb 10.2 oz (77.4 kg), SpO2 99 %. Temp (24hrs), Av.7 °F (36.5 °C), Min:97.3 °F (36.3 °C), Max:98.3 °F (36.8 °C)      Physical Exam:  GENERAL: alert, cooperative, no distress, appears stated age, LUNG:  no resp difficulty, ABDOMEN:  ND and EXTREMITIES:  no significant edema, left second toe infection    Labs: Results:       Chemistry Recent Labs      17   1450   GLU  191*   NA  135*   K  4.2   CL  99*   CO2  28   BUN  19*   CREA  1.35*   CA  9.5   AGAP  8   BUCR  14   AP  117   TP  7.7   ALB  3.3*   GLOB  4.4*   AGRAT  0.8      CBC w/Diff Recent Labs      17   1450   WBC  5.7   RBC  4.07*   HGB  11.7*   HCT  36.1   PLT  253   GRANS  76*   LYMPH  17*   EOS  1      Microbiology Recent Labs      17   1525  17   1450   CULT  NO GROWTH AFTER 15 HOURS  NO GROWTH AFTER 15 HOURS      Coagulation Recent Labs      17   1450   PTP  13.2   INR  1.0   APTT  31.2         Data Review: images and reports reviewed    Assessment:     Active Problems:    Osteomyelitis ([address] (2017)        Plan/Recommendations/Medical Decision Making:     Continue present treatment   ABX, pain control. Wound care per Podiatry. Plan for angio today as no plans for OR today per Podiatry. Keep NPO. Scr elevated. Will start gentle hydration prior to angio.      Palmira Solis  [id number] Binge frequency: Daily or almost daily      Comment: 2 cans beer/day    Drug use: No      Family History:  Family History   Problem Relation Age of Onset   • Hypertension Father    • Neurological Disorder Father         Parkinsons   • Neurological Diso IMPRESSION: No acute cardiopulmonary process. Xr Ribs, Unilateral (2 Views), Left (cpt=71100)    Result Date: 4/14/2019  IMPRESSION:  Acute fractures of the left anterior-lateral seventh and eighth ribs.     Xr Chest Ap Portable  (cpt=71045)    Result Da BILT 4.0 04/23/2019    TP 8.8 04/23/2019     04/23/2019     04/23/2019    INR 2.56 04/23/2019       Assessment/Plan:    #status epilepticus  -EEG in AM, sooner if warranted  -continue Keppra BID  -Neuro Q4hr  -propofol for sedation, ativan i

## 2019-04-24 NOTE — CONSULTS
Pulmonary / Critical Care H&P/Consult       NAME: Tremayne Sol - ROOM: [facility] - MRN: [id number] - Age: 47year old - :  1965    Date of Admission: 2019  6:02 PM  Admission Diagnosis: Status epilepticus ([facility] [address] [G40.901]  Left arm weakne Inability: Not on file      Transportation needs:        Medical: Not on file        Non-medical: Not on file    Tobacco Use      Smoking status: Former Smoker        Packs/day: 0.75        Years: 10.00        Pack years: 7.5        Quit date: 11/3/1996 alzhemiers and Parkinsons        Home Medications:    Outpatient Medications Marked as Taking for the 4/23/19 encounter [facility] Encounter):  ATORVASTATIN 40 MG Oral Tab TAKE ONE TABLET BY MOUTH EVERY EVENING Disp: 30 tablet Rfl: 0   Thiamine HCl (B-1) 10 Ventilator Settings: AC 16// FIO2 40/PEEP 5   Ppk 17   Ppl 13    FiO2 (%): 40 %           Wt Readings from Last 3 Encounters:  04/24/19 : 116 lb 13.5 oz (53 kg)  04/10/19 : 116 lb (52.6 kg)  02/05/19 : 115 lb (52.2 kg)        [place] 19  1818   *   *   alk phos 284; bili 4.0  Trop negative  Acute hepatitis panel neg  Tylenol neg  Ab.//406/17  Lactic acid 5.6 -> 2.2  procalc 1.6  Ammonia 67    Imaging: cxr: et tube in place, lungs clear  Ct head: no acute fin

## 2019-04-24 NOTE — H&P
[facility] Hospitalist H&P       CC: Patient presents with:  Altered Mental Status (neurologic)       PCP: Jayshree Khan DO    History of Present Illness:  Patient is a 47year old female with PMH sig for seizure disorder, etoh abuse with ?etoh induced cardiom 5   Warfarin Sodium 7.5 MG Oral Tab Take 1 tablet by mouth nightly AS DIRECTED BY COUMADIN CLINIC Disp: 30 tablet Rfl: 11   Sertraline HCl 100 MG Oral Tab Take 1 tablet (100 mg total) by mouth daily.  Disp: 30 tablet Rfl: 5   lisinopril 5 MG Oral Tab Take 1 119/79   Pulse 93   Temp 98 °F (36.7 °C) (Temporal)   Resp 16   Ht 5' 4\" (1.626 m)   Wt 116 lb 13.5 oz (53 kg)   SpO2 100%   BMI 20.06 kg/m²   PE:  Gen: intubated and sedated  HEENT: normocephalic, MMM, ET tube in place  Lungs: CTA, good effort  CV: nl S1 The kidneys are symmetric in size and shape without evidence of hydronephrosis. No obstructing urolithiasis. BOWEL/MESENTERY:  Bowel is normal in caliber. No evidence of obstruction. Colonic diverticulosis without evidence of diverticulitis.   The appendi Radiology) NRDR ([address]) which includes the Dose Index Registry. PATIENT STATED HISTORY:(As transcribed by Technologist)  Patient had two seizures and not responsive.    FINDINGS: Stable mild to moderate global brain parenchymal v Radiology) NRDR ([address]) which includes the Dose Index Registry. PATIENT STATED HISTORY:(As transcribed by Technologist)  Patient had two seizures and is currently non-responsive.    CONTRAST USED:  75cc of Omnipaque 350  FINDINGS stenosis in the cervical vertebral or carotid arteries. No evidence of hemodynamically significant carotid stenosis by NASCET criteria. Critical results were discussed with Dr. Samantha Moss at [address] hr on 4/23/2019. Critical results were read back.    Dictated

## 2019-04-25 ENCOUNTER — APPOINTMENT (OUTPATIENT)
Dept: MRI IMAGING | Facility: HOSPITAL | Age: 54
DRG: 100 | End: 2019-04-25
Attending: Other
Payer: COMMERCIAL

## 2019-04-25 PROCEDURE — 70553 MRI BRAIN STEM W/O & W/DYE: CPT | Performed by: OTHER

## 2019-04-25 PROCEDURE — 99291 CRITICAL CARE FIRST HOUR: CPT | Performed by: OTHER

## 2019-04-25 PROCEDURE — 95819 EEG AWAKE AND ASLEEP: CPT | Performed by: OTHER

## 2019-04-25 RX ORDER — METOPROLOL SUCCINATE 25 MG/1
25 TABLET, EXTENDED RELEASE ORAL
Status: DISCONTINUED | OUTPATIENT
Start: 2019-04-25 | End: 2019-04-27

## 2019-04-25 RX ORDER — LACTULOSE 20 G/30ML
20 SOLUTION ORAL
Status: DISCONTINUED | OUTPATIENT
Start: 2019-04-25 | End: 2019-04-26

## 2019-04-25 NOTE — PROGRESS NOTES
[facility]  Neurocritical Care       Subjective: Benny Meza is a(n) 47year old female with Etoh abuse, EtOH liver disease, cardiomyopathy, DVT, EtOH withdrawal seizures and R PCA stroke. Extubated this am and doing well.  Follow is midline. Power of tongue normal.        Motor: Tone and strength normal and symmetric. 5/5 b/l   Sensory: Normal and symmetric to light touch. Cerebellar: Finger-nose-finger intact. Gait: Deferred.   Diagnostics:   Xr Chest Pa + Lat Chest (cpt=71046) KIDNEYS:  Normal.  Right kidney measures 10.3 cm. Left kidney measures 10.2 cm. AORTA/IVC:  Normal.      CONCLUSION:  1. Fatty infiltration of the liver. 2. Pancreas is obscured limiting evaluation.   Please refer to dedicated CT of the abdomen and pelvis changes greatest at the L5-S1 level. CONCLUSION:  1. No acute findings. 2. Colonic diverticulosis without evidence of diverticulitis. 3. Hepatic steatosis.     Dictated by: Malachi Holstein, MD on 4/23/2019 at 21:13     Approved by: Malachi Holstein, MD CT BRAIN OR HEAD ([id number]), 7/23/2018, 11:02. INDICATIONS:  AMS  TECHNIQUE:  Noncontrast CT scanning is performed through the brain. Dose reduction techniques were used.  Dose information is transmitted to the [facility] [employer] of Radiology) NRDR (Natio reformats were performed through the carotid and vertebral arteries. All measurements obtained in this exam were performed using NASCET. Dose reduction techniques were used.  Dose information is transmitted to the  [facility]) Jacquelin Jackson 35 (N heterogenous thyroid gland. Scarring/atelectasis in the upper lungs. CONCLUSION:   1. No evidence of intracranial aneurysm, flow-limiting stenosis, or focal arterial occlusion.   2. No evidence of occlusion, dissection, or flow-limiting stenosis in t retention enema 200 g Rectal 4 times per day   INFUVITE ADULT (INFUVITE) 10 mL, Thiamine HCl 108 mg, folic acid (FOLVITE) 1 mg in sodium chloride 0.9% 1,000 mL infusion  Intravenous Q24H   ondansetron HCl (ZOFRAN) injection 4 mg 4 mg Intravenous Q4H PRN Summary (Last 24 hours) at 4/25/2019 1027  Last data filed at 4/25/2019 0917  Gross per 24 hour   Intake 1549 ml   Output 1000 ml   Net 549 ml   -     - Electrolytes – Na 137, K 3.3     GI:  - GI Prophylaxis         -     NH3: 40 on 4/25/19        -     GI

## 2019-04-25 NOTE — PLAN OF CARE
Problem: Impaired Swallowing  Goal: Minimize aspiration risk  Description  Interventions:  - Patient should be alert and upright for all feedings (90 degrees preferred)  - Offer food and liquids at a slow rate  - No straws  - Encourage small bites of morenita

## 2019-04-25 NOTE — PAYOR COMM NOTE
--------------  ADMISSION REVIEW     Payor: Arianne [facility] #:  [id number]  Authorization Number: [id number]     Admit date: 4/23/19  Admit time: 2103       Admitting Physician: Nneka Patten MD  Attending Physician:  Fiona Blue • Heart attack ([facility] 05/21/2012   • History of blood clots     legs; heart; head   • History of ETOH abuse    • Pneumonia, organism unspecified(486)    • Psychiatric disorder    • Seizure disorder ([facility]    • Stroke ([place] [address].) 04/2015   • Unspecified essential h Abdomen soft nontender  Cardiovascular: Regular without murmurs  Abdomen soft nontender. Extremities: Good pulses bilaterally. Neuro: The patient is breathing on her own. Is not following commands.   She will grab my hand with the right hand and she The following orders were created for panel order CBC WITH DIFFERENTIAL WITH PLATELET.   Procedure                               Abnormality         Status                     ---------                               -----------         ------ She came back to the emergency room and was noted to have a clenched mouth and I felt that she might be seizing still intermittently. I felt this is a status epilepticus. The patient family states that she does drink.   She has never had any seizures befo Result Date: 4/14/2019  DATE OF SERVICE: 04.14.2019 LEFT RIBS CLINICAL INFORMATION: Pain, unspecified COMPARISON STUDY: None. TECHNIQUE: AP upper and lower ribs, & oblique upper and lower ribs, 4 views. FINDINGS: The visualized lung is clear.  No pleural ab PROCEDURE:  CT STROKE PROTOCOL (NO CONTRAST) (CPT=70450)  COMPARISON:  [facility] , CT BRAIN OR HEAD ([id number]), 7/23/2018, 11:02. INDICATIONS:  AMS  TECHNIQUE:  Noncontrast CT scanning is performed through the brain. Dose reduction techniques were used.  Dose i PROCEDURE:  CT STROKE CTA BRAIN/CTA NECK (W IV)(CPT=70496/94479)  COMPARISON:  [facility] CT STROKE CTA BRAIN/CTA NECK (W IV)(CPT=70496/55415), 7/16/2018, 14:55.   INDICATIONS:  AMS  TECHNIQUE  Noncontrast CT scanning is performed through the brain and CT ang stenosis in the carotid bulbs by NASCET criteria. The cervical internal carotid arteries are widely patent. The vertebral arteries originate from the subclavian arteries. The origins of the vertebral arteries are patent.   The cervical vertebral arteries DATE OF SERVICE: 04.14.2019 PROCEDURE: XR CHEST PA + LAT CHEST (ITW=87151) CLINICAL HISTORY:  Pain, unspecified. COMPARISON: None TECHNIQUE: Frontal and lateral views of the chest. FINDINGS: The lungs are clear.  The heart, mediastinum and pleura are unrema PROCEDURE:  CT STROKE PROTOCOL (NO CONTRAST) (CPT=70450)  COMPARISON:  [facility] , CT BRAIN OR HEAD ([id number]), 7/23/2018, 11:02. INDICATIONS:  AMS  TECHNIQUE:  Noncontrast CT scanning is performed through the brain. Dose reduction techniques were used.  Dose i PROCEDURE:  CT STROKE CTA BRAIN/CTA NECK (W IV)(CPT=70496/45381)  COMPARISON:  [facility] CT STROKE CTA BRAIN/CTA NECK (W IV)(CPT=70496/80086), 7/16/2018, 14:55.   INDICATIONS:  AMS  TECHNIQUE  Noncontrast CT scanning is performed through the brain and CT ang CONCLUSION:  Endotracheal tube tip 2.5 cm from the traci. No lobar pneumonia or overt congestive failure.     Dictated by: Teresa Salas MD on 4/23/2019 at 19:54     Approved by: Teresa Salas MD            Ct Stroke Brain (no Iv)(cpt=70450)    Result A total of 35 minutes of critical care time (exclusive of billable procedures) was administered to manage the patient's unstable vital signs due to her altered mental status.   This involved direct patient intervention, complex decision making, and/or exten Patient is a 47year old female with PMH sig for seizure disorder, etoh abuse with ?etoh induced cardiomyopathy, CAD, HTN, CVA, blood clots, presented to ED overnight.  Hx obtained from chart review since pt is intubated, sedated, and no family present at t Sertraline HCl 100 MG Oral Tab Take 1 tablet (100 mg total) by mouth daily. Disp: 30 tablet Rfl: 5   lisinopril 5 MG Oral Tab Take 1 tablet (5 mg total) by mouth nightly.  Disp: 90 tablet Rfl: 3   Metoprolol Succinate ER 25 MG Oral Tablet 24 Hr Take 1 table /79   Pulse 93   Temp 98 °F (36.7 °C) (Temporal)   Resp 16   Ht 5' 4\" (1.626 m)   Wt 116 lb 13.5 oz (53 kg)   SpO2 100%   BMI 20.06 kg/m²    PE:  Gen: intubated and sedated  HEENT: normocephalic, MMM, ET tube in place  Lungs: CTA, good effort  CV: n PROCEDURE:  CT ABDOMEN+PELVIS (CPT=74176)  COMPARISON:  [facility] , CT ABDOMEN PELVIS IV CONTRAST, NO ORAL (ER), 3/04/2016, 16:54.   INDICATIONS:  AMS  TECHNIQUE:  Unenhanced multislice CT scanning was performed from the dome of the diaphragm to the pubic symp PROCEDURE:  XR CHEST AP PORTABLE  (CPT=71045)  TECHNIQUE:  AP chest radiograph was obtained. COMPARISON:  [facility] XR CHEST AP PORTABLE  (CPT=71045), 4/23/2019, 19:26.   INDICATIONS:  hypoxia  PATIENT STATED HISTORY: (As transcribed by Technologist)  Antionette CONCLUSION:  No acute intracranial abnormality identified. Stable chronic ischemic changes as above. If there is clinical concern for acute ischemia/infarction, an MRI of the brain would be recommended for further evaluation.   Critical results were discus PROCEDURE:  CT STROKE CTA BRAIN/CTA NECK (W IV)(CPT=70496/53170)  COMPARISON:  [facility] CT STROKE CTA BRAIN/CTA NECK (W IV)(CPT=70496/11296), 7/16/2018, 14:55.   INDICATIONS:  AMS  TECHNIQUE  Noncontrast CT scanning is performed through the brain and CT ang 4/24/2019 2020 Given 15 mL Mouth/Throat Usama Bills RN      magnesium sulfate 2 g, Potassium Phosphate Dibasic 30 mmol in dextrose 5 % 250 mL ivpb     Date Action Dose Route User    4/25/2019 8765 Rate/Dose Verify (none) Intravenous Usama Bills, 4/25/2019 0509 New Bag 500 mg Intravenous Tg Lay RN    4/24/2019 1859 New Bag 500 mg Intravenous Elvis Myers RN      Metoprolol Succinate ER (Toprol XL) 24 hr tab 25 mg     Date Action Dose Route User    4/25/2019 1245 Given 25 mg Oral P Sylvia Thornton is a 47year old female with PMHx significant for cad, depression, past right frontoparietal region cva, etoh induced cardiomyopathy, hypertension, and dvt on coumadin.   She presented to the hospital after not feeling well, nausea and vo Neurologic: This patient is alert and orientated x cortez, sedated. Speech deferred. Pupils equally round and reactive to light. 3+ brisk bilaterally. EOMs deferred. Visual fields are deferred. Tongue is midline.  Face is symmetrical. Uvula and palate carlos Ct head: no acute findings. Ct a/p: diverticulosis without diverticulitis. Overall unremarkable. Echo 1/2019: ef 40%;      ASSESSMENT/PLAN:     1. Acute resp failure: secondary to inability to protect airway from seizures.  No evidence of aspiration pne Inpatient w/u thus far negative for CVA. Ct abd and pelvis shows steatosis of the liver. No prior outpt evaluation by [facility]. Last inpatient contact was in 2016 for abdominal pain related to Etoh hepatitis.   No prior endoscopies noted in Ep Patient is a56 year old female with a PMH significant for EtOH abuse, CAD, depression, EtOH induced cardiomyopathy, HTN, previous DVT on coumadin, and R parietal stroke (coumadin non-compliant) who presents to the ED in status epilepticus.  She has a questi - CIWA protocol with Thiamine and ativan as per protcol  - PT/OT and Speech Therapy when appropriate     Cardiac:  · Systolic BP Goal [id number]  · EKG and cardiac monitor  · Will consult cardiology fo h/o EtOH induced cardiomyopathy and poor EF recomendat • Chlorhexidine Gluconate  15 mL Mouth/Throat Kenna@Lumate   • famoTIDine  20 mg Intravenous BID      Continuous Infusing Medication:  • sodium chloride Stopped (04/24/19 3818)   • dexmedetomidine 0.4 mcg/kg/hr (04/25/19 3729)   • fentanyl (SUBLIMAZE) inf 0405    136 137   K 3.8 4.4 3.3*   CL 99 104 107   CO2 16.0* 24.0 23.0   BUN 11 9 5*            Imaging: us abd: fatty liver; otherwise unremarkable.      ASSESSMENT/PLAN:     1.  Acute resp failure: secondary to inability to protect airway from seizu She presented 4/23/19 pm after witnessed seizures by her . She had been drinking the day before, but on the day of admit she was nauseous and vomited several times. On arrival she was intubated, subsequently extubated this morning.   Ramos cosme bee

## 2019-04-25 NOTE — PLAN OF CARE
Problem: RESPIRATORY - ADULT  Goal: Achieves optimal ventilation and oxygenation  Description  INTERVENTIONS:  - Assess for changes in respiratory status  - Assess for changes in mentation and behavior  - Position to facilitate oxygenation and minimize r signs stable, heart rate regular, lungs diminished to auscultation.  at bedside this AM participating in plan of care. Patient on room air with O2 sat 99%. Patient tolerating sips of water with no s/s of aspiration.  Patient CIWA scores less than 7 t

## 2019-04-25 NOTE — PLAN OF CARE
Assumed care of the pt at 0730, pt sedated and restrained; pt changed propofol to precedex, turned off 4 plus hours, pt remains very drowsy, not able to stay awake;failed weaning trial.  [facility] stated would retry in [place] in place,  at [facility]

## 2019-04-25 NOTE — SLP NOTE
ADULT SWALLOWING EVALUATION    ASSESSMENT    ASSESSMENT/OVERALL IMPRESSION:  Pt seen this PM for bedside swallow evaluation. RN approved session. Pt admitted to hospital due to c/o of nausea with vomiting, and seizure activity with left sided weakness.  Upo Plan/Recommendations: Dysphagia therapy(consider cognitive communication evaluation)  Discharge Recommendations/Plan: Undetermined    HISTORY   MEDICAL HISTORY  Reason for Referral: Stroke protocol    Problem List  Principal Problem:    Status epilepticus Unlabored  Consistencies Trialed: Thin liquids;Puree;Hard solid  Method of Presentation: Self presentation;Spoon;Straw;Single sips;Cup;Consecutive swallows  Patient Positioning: Upright    Oral Phase of Swallow:  Within Functional Limits    Pharyngeal Phase

## 2019-04-25 NOTE — PHYSICAL THERAPY NOTE
Order received for PT eval per [facility] ER protocol. Pt remains intubated and not appropriate for therapy. Will follow up 4/26/19.

## 2019-04-25 NOTE — CONSULTS
[facility] Cardiology Consultation    Sandy Resendiz Patient Status:  Inpatient    1965 MRN [id number]   [facility] 6NE-A Attending Marthenia Goodell, MD   [facility] Day # 2 PCP Hernando Stephen DO     Reason for Consultation:  ETOH cardiomyop Father         Parkinsons   • Neurological Disorder Mother         alzhemiers and Parkinsons         Allergies:    Penicillins             RASH    Comment:Once when a child. Got a dose as an adult with no             reaction.  Was on zosyn for UTI during a 04/25/19  0405   WBC 9.7 6.1 7.5   HGB 15.6 13.8 13.0   .0* 92.0* 59.0*       Chem:  Recent Labs   Lab 04/23/19  1818 04/24/19  0416 04/25/19  0405    136 137   K 3.8 4.4 3.3*   CL 99 104 107   CO2 16.0* 24.0 23.0   BUN 11 9 5*   CREATSERUM 1.

## 2019-04-25 NOTE — PROGRESS NOTES
ARTURO ONTARIO Justin Patient Status:  Inpatient    1965 MRN [id number]   [facility] 6NE-A Attending Imer Melendez MD   [facility] Day # 2 PCP Ousmane Byrne DO     Pulm / Critical Care Progress Note     S: pt more sean atraumatic. Lungs: ctab   Chest wall: No tenderness or deformity. Heart: Regular rate and rhythm, normal S1S2, no murmur. Abdomen: soft, non-tender, non-distended, positive BS.    Extremity: no edema         Recent Labs   Lab 04/23/19  1814 04/24/19 today, consider resuming tomorrow. 8. Fen: npo  9. Prophy; elevated INR  10. Dispo: full code. ICU.  Critically ill.          Critical Care Time greater than: 35 minutes      Enrique Nolan M.D.  [facility] pulmonary / critical care  4/25/2019  8:15 AM

## 2019-04-25 NOTE — PLAN OF CARE
Received patient on Vc+ 10/400/40/+5. No changes were made overnight. No events overnight. Attempt CPAP trial again today. Continue to monitor.

## 2019-04-25 NOTE — PROGRESS NOTES
[facility] Hospitalist Progress Note     Grant Willis Patient Status:  Inpatient    1965 MRN [id number]   [facility] 6NE-A Attending Yoanna Quinn MD   [facility] Day # 2 PCP Leland Mckenna DO     CC: follow up    SUBJECTIVE:  Extubated.  Sl Oral Daily Beta Blocker   • lactulose (ENULOSE) 200 gm retention enema  200 g Rectal 4 times per day   • multivitamin/thiamine/folic acid infusion   Intravenous Q24H   • levETIRAcetam  500 mg Intravenous Q12H   • LORazepam  1 mg Intravenous Once   • docusa

## 2019-04-25 NOTE — BH PROGRESS NOTE
Behavioral Health Note  CHIEF COMPLAINT  Altered Mental Status  REASON FOR ADMISSION  Seizure  SOCIAL HISTORY  Sara Began lives with her  and children. She does not smoke or use drugs. She reports drinking 2 beers per day.    PAST PSYCHIATRIC HISTORY SI/HI  ASSESSMENT  The patient has a dx etoh dependence, etoh withdrawal, r/o depressive disorder. PLAN  1. Patient declined resources.   Dileep Londono [facility]

## 2019-04-25 NOTE — PLAN OF CARE
Assumed care of Kaylee Cea @ approximately 4911; intubated and lightly sedated with Precedex; follows some simple commands; MERCADO; CIWA scale Q2H per protocol; neuro checks Q4H; lactulose enema QID      Please see flow-sheets for full assessments and/or additio stability  Description  INTERVENTIONS:  - Monitor vital signs, rhythm, and trends  - Monitor for bleeding, hypotension and signs of decreased cardiac output  - Evaluate effectiveness of vasoactive medications to optimize hemodynamic stability  - Monitor ar Monitor response to electrolyte replacements, including rhythm and repeat lab results as appropriate  - Fluid restriction as ordered  - Instruct patient on fluid and nutrition restrictions as appropriate  4/25/2019 0503 by Theron Soto RN  Outcome: Pro activity  Description  INTERVENTIONS:  - Maintain airway, patient safety  and administer oxygen as ordered  - Monitor patient for seizure activity, document and report duration and description of seizure to MD/LIP  - If seizure occurs, turn patient to side

## 2019-04-25 NOTE — PROCEDURES
POLLY - ELECTROENCEPHALOGRAM (EEG) REPORT  Patient Name:  Olinda Pickett   MRN / CSN:  [id number] / [id number]   Date of Birth / Age:  5/5/1965 /  47year old   Encounter Date:  4/25/19         METHODS:  Twenty-two electrodes were applied according to the

## 2019-04-26 PROCEDURE — 99291 CRITICAL CARE FIRST HOUR: CPT | Performed by: OTHER

## 2019-04-26 RX ORDER — POTASSIUM CHLORIDE 20 MEQ/1
40 TABLET, EXTENDED RELEASE ORAL EVERY 4 HOURS
Status: DISCONTINUED | OUTPATIENT
Start: 2019-04-26 | End: 2019-04-26

## 2019-04-26 RX ORDER — ATORVASTATIN CALCIUM 40 MG/1
40 TABLET, FILM COATED ORAL NIGHTLY
Status: DISCONTINUED | OUTPATIENT
Start: 2019-04-26 | End: 2019-04-27

## 2019-04-26 RX ORDER — DOXEPIN HYDROCHLORIDE 50 MG/1
1 CAPSULE ORAL DAILY
Status: DISCONTINUED | OUTPATIENT
Start: 2019-04-26 | End: 2019-04-27

## 2019-04-26 RX ORDER — FOLIC ACID 1 MG/1
1 TABLET ORAL DAILY
Status: DISCONTINUED | OUTPATIENT
Start: 2019-04-26 | End: 2019-04-27

## 2019-04-26 RX ORDER — LISINOPRIL 2.5 MG/1
5 TABLET ORAL DAILY
Status: DISCONTINUED | OUTPATIENT
Start: 2019-04-26 | End: 2019-04-27

## 2019-04-26 RX ORDER — MAGNESIUM OXIDE 400 MG (241.3 MG MAGNESIUM) TABLET
400 TABLET ONCE
Status: COMPLETED | OUTPATIENT
Start: 2019-04-26 | End: 2019-04-26

## 2019-04-26 RX ORDER — WARFARIN SODIUM 7.5 MG/1
3.75 TABLET ORAL
Status: COMPLETED | OUTPATIENT
Start: 2019-04-26 | End: 2019-04-26

## 2019-04-26 RX ORDER — POTASSIUM CHLORIDE 20 MEQ/1
40 TABLET, EXTENDED RELEASE ORAL EVERY 4 HOURS
Status: COMPLETED | OUTPATIENT
Start: 2019-04-26 | End: 2019-04-26

## 2019-04-26 RX ORDER — LACTULOSE 20 G/30ML
20 SOLUTION ORAL DAILY
Status: DISCONTINUED | OUTPATIENT
Start: 2019-04-27 | End: 2019-04-27

## 2019-04-26 RX ORDER — LEVETIRACETAM 500 MG/1
500 TABLET ORAL 2 TIMES DAILY
Status: DISCONTINUED | OUTPATIENT
Start: 2019-04-26 | End: 2019-04-27

## 2019-04-26 RX ORDER — MAGNESIUM OXIDE 400 MG (241.3 MG MAGNESIUM) TABLET
400 TABLET ONCE
Status: DISCONTINUED | OUTPATIENT
Start: 2019-04-26 | End: 2019-04-26

## 2019-04-26 RX ORDER — FAMOTIDINE 20 MG/1
20 TABLET ORAL 2 TIMES DAILY
Status: DISCONTINUED | OUTPATIENT
Start: 2019-04-26 | End: 2019-04-27

## 2019-04-26 RX ORDER — MELATONIN
100 DAILY
Status: DISCONTINUED | OUTPATIENT
Start: 2019-04-26 | End: 2019-04-27

## 2019-04-26 NOTE — PLAN OF CARE
Thought it was August knows year easily re orientated . Unsteady gait using walker PT and OT consulted. Electrolyte replacement . Seizure precautions and CIWA . Remains cooperative.  here at bedside updated care plan.  Transfer to floor when bed parth

## 2019-04-26 NOTE — OCCUPATIONAL THERAPY NOTE
OCCUPATIONAL THERAPY EVALUATION - INPATIENT     Room Number: 2079/0564-T  Evaluation Date: 4/26/2019  Type of Evaluation: Initial  Presenting Problem: status epilepticus    Physician Order: IP Consult to Occupational Therapy  Reason for Therapy: ADL/IADL D side from my stroke. \"    Patient self-stated goal is TBD.       OBJECTIVE  Precautions: (-150, L visual field cut)  Fall Risk: High fall risk    WEIGHT BEARING RESTRICTION  Weight Bearing Restriction: None                PAIN ASSESSMENT  Ratin OT while in the hospital. Pt able to complete UE dressing, donangela townsend followed by UE assessment. Instructed pt on L peripheral field stimulation activities that she can complete to provide stimulation to boarder of L visual field.   Pt voiced understandi Specific performance deficits impacting engagement in ADL/IADL LOW  1 - 3 performance deficits    Client Assessment/Performance Deficits LOW - No comorbidities nor modifications of tasks    Clinical Decision Making LOW - Analysis of occupational profile,

## 2019-04-26 NOTE — PLAN OF CARE
Problem: Impaired Functional Mobility  Goal: Achieve highest/safest level of mobility/gait  Description  Interventions:  - Patient has left visual field cut. Modification to diet order to place hot foods and drinks on right side of tray.   - Assess patien

## 2019-04-26 NOTE — SLP NOTE
SPEECH DISCHARGE NOTE - INPATIENT    ASSESSMENT & PLAN   ASSESSMENT  Pt seen for trials of recommended diet consistencies to ensure adherence to swallowing guidelines and precautions.   Pt returning from bathroom with general supervision and with use of a w aspiration with 100 % accuracy over 1-2 session(s).  Goal met   Goal #2 The patient/family/caregiver will demonstrate understanding and implementation of aspiration precautions and swallow strategies independently over 1-2 session(s).    Goal met      [facility]

## 2019-04-26 NOTE — PROGRESS NOTES
ARTURO ORO Choromokos Patient Status:  Inpatient    1965 MRN [id number]   [facility] 6NE-A Attending Fay Salcedo MD   [facility] Day # 3 PCP Jayshree Khan DO     Critical Care Progress Note     Date of Admission: 2019 PRN     OBJECTIVE:  /77 (BP Location: Right arm)   Pulse 72   Temp 97.8 °F (36.6 °C) (Temporal)   Resp 20   Ht 5' 4\" (1.626 m)   Wt 118 lb 9.7 oz (53.8 kg)   SpO2 97%   BMI 20.36 kg/m²      Ventilator Settings: RA      Wt Readings from Last 3 Encoun interpretation except where noted. ASSESSMENT/PLAN:  1. Acute resp failure: secondary to inability to protect airway from seizures. No evidence of aspiration pneumonia / pneumonitis. - extubated 4/25  - improved, now on RA  2.  Seizures: ct head neg

## 2019-04-26 NOTE — CONSULTS
Pharmacy Dosing Service: Warfarin (Coumadin)    Grant Willis is a 47year old female on chronic Coumadin for history of cardio-emoblic CVAs. Coumadin has been on hold due to supratherapuetic INRs.   INR now subtherapeutic and Coumadin will be resumed

## 2019-04-26 NOTE — PROGRESS NOTES
[facility]  Progress Note    Renate Guardado Patient Status:  Inpatient    1965 MRN [id number]   [facility] 6NE-A Attending Juarez Aquino MD   [facility] Day # 2 PCP Sj Kee DO     Subjective:  Awake and alert. No complaints. Stroke [facility]) 2015   • Unspecified essential hypertension      Past Surgical History:   Procedure Laterality Date   • ANGIOGRAM  13    minimal CAD in the RCA   • ANGIOPLASTY (CORONARY)  12    LAD-thrombotic event   •      • IR ANGIOGR (H)       Assessment:     Elevated transaminases  Etoh abuse with hx of alcohol withdrawal seizures  Elevated ammonia   Extubated. Ammonia 40. Ceruloplasmin mildly decreased at 17 though albumin is 2.2 and low total protein.                 Pt presents with

## 2019-04-26 NOTE — PROGRESS NOTES
[facility]  Progress Note    Jojo Griffin Patient Status:  Inpatient    1965 MRN [id number]   [facility] 6NE-A Attending Sera Yuan MD   [address] Day # 3 PCP Allan Pascual DO     Subjective:  Stools x 2. Interactive.   Compl Date   • ANGIOGRAM  13    minimal CAD in the RCA   • ANGIOPLASTY (CORONARY)  12    LAD-thrombotic event   •      • IR ANGIOGRAM CEREBRAL CAROTID BILATERAL  2018              Objective:  Blood pressure 125/76, pulse 70, temperature varices as outpt.     Plan:  1. Lactulose decreased to 30 cc daily. 2. will repeat ceruloplasmin as outpt when pt at baseline nutritional status. JOSE comprehensive is pending. 3. Outpt egd and colonoscopy. 4. Etoh abstinence.     Crystal Thapa  4/26/20

## 2019-04-26 NOTE — PROGRESS NOTES
[facility]  Neurocritical Care       Subjective: Anupama Nina is a(n) 47year old female with EtOH abuse, previous R PCA stroke, cardiomyopathy, DVT and EtOH withdrawal seizures vs seizure form previous stroke.  She is doing well o Motor: Tone is normal. Mild diffuse body pains limiting full strength but nothing focal on exam  Sensory: Normal and symmetric to light touch. Cerebellar: Finger-nose-finger intact. Gait: Deferred.   Diagnostics:   Xr Chest Pa + Lat Chest (cpt=71046) KIDNEYS:  Normal.  Right kidney measures 10.3 cm. Left kidney measures 10.2 cm. AORTA/IVC:  Normal.      CONCLUSION:  1. Fatty infiltration of the liver. 2. Pancreas is obscured limiting evaluation.   Please refer to dedicated CT of the abdomen and pelvis focal mineralization bilaterally.    Dictated by: Tito Galeana MD on 4/25/2019 at 22:17     Approved by: Tito Galeana MD            Ct Abdomen+pelvis(cpt=74176)    Result Date: 4/23/2019  PROCEDURE:  CT ABDOMEN+PELVIS (VZV=41936)  COMPARISON:  STEFANIE PERALTA Xr Chest Ap Portable  (cpt=71045)    Result Date: 4/24/2019  PROCEDURE:  XR CHEST AP PORTABLE  (CPT=71045)  TECHNIQUE:  AP chest radiograph was obtained. COMPARISON:  FABIAN XR CHEST AP PORTABLE  (CPT=71045), 4/23/2019, 19:26.   INDICATIONS:  hypoxi Radiology Data Registry) which includes the Dose Index Registry. PATIENT STATED HISTORY:(As transcribed by Technologist)  Patient had two seizures and not responsive.    FINDINGS: Stable mild to moderate global brain parenchymal volume loss without overt h Radiology Data Registry) which includes the Dose Index Registry. PATIENT STATED HISTORY:(As transcribed by Technologist)  Patient had two seizures and is currently non-responsive.    CONTRAST USED:  75cc of Omnipaque 350  FINDINGS:  The upper cervical, pet vertebral or carotid arteries. No evidence of hemodynamically significant carotid stenosis by NASCET criteria. Critical results were discussed with Dr. Vika Claudio at [address] hr on 4/23/2019. Critical results were read back.    Dictated by: Guillermina Ventura MD o Succinate ER (Toprol XL) 24 hr tab 25 mg 25 mg Oral Daily Beta Blocker   lactulose (ENULOSE) solution 20 g 20 g [email or web address]   ondansetron HCl (ZOFRAN) injection 4 mg 4 mg Intravenous Q4H PRN   0.9%  NaCl infusion  Intravenous Continuous   LORazepam Prophylaxis         -     GI on consult for EtOH induced liver failure and hyperammonemia        -     Lactulose as per GI        -     NH3: 40 on 4/25/19  - Bowel Regimen     Heme/ID:  - WBC: 6.6          -     Plt: 55.  Will transfuse Plt if needed (below

## 2019-04-26 NOTE — PHYSICAL THERAPY NOTE
PHYSICAL THERAPY EVALUATION - INPATIENT     Room Number: 7510/8189-T  Evaluation Date: 4/26/2019  Type of Evaluation: Initial  Physician Order: PT Eval and Treat    Presenting Problem: seizure, ETOH withdrawl  Reason for Therapy: Mobility Dysfunction home.     OBJECTIVE  Precautions: (-150, L visual field cut)  Fall Risk: High fall risk    WEIGHT BEARING RESTRICTION  Weight Bearing Restriction: None                PAIN ASSESSMENT  Ratin  Location: generalized  Management Techniques: Breathin walker  Pattern: (slow beverley)          Skilled Therapy Provided: Pt received supine in bed and is agreeable to therapy. Pt supine-sit with supervision. Pt demonstrates good static sitting balance at EOB. Pt sit-stand with RW and CGA for safety.  Pt educat training. DISCHARGE RECOMMENDATIONS  PT Discharge Recommendations: Home;Outpatient PT    PLAN  PT Treatment Plan: Bed mobility; Endurance; Energy conservation;Patient education;Gait training;Strengthening;Stair training;Transfer training;Balance training

## 2019-04-26 NOTE — PROGRESS NOTES
[facility] Cardiology Progress Note        Lanice Check Patient Status:  Inpatient    1965 MRN [id number]   [facility] 6NE-A Attending Mo Mensah MD   [facility] Day # 3 PCP Paulie Fields DO     Subjective:  T Telemetry: sinus    EKG:      Echo:      Cardiac Cath:      Labs:  HEM:  Recent Labs   Lab 04/23/19  1818 04/24/19  0416 04/25/19  0405 04/26/19  0358   WBC 9.7 6.1 7.5 6.6   HGB 15.6 13.8 13.0 11.9*   .0* 92.0* 59.0* 55.0*       Chem:  Recent L

## 2019-04-26 NOTE — CM/SW NOTE
04/26/19 1100   CM/SW Referral Data   Referral Source Social Work (self-referral)   Reason for Referral Discharge planning   Informant Patient;Spouse   Social History   Recreational Drug/Alcohol Use   (Alcohol abuse)   Patient Info   Patient's Mental St

## 2019-04-26 NOTE — PLAN OF CARE
Assumed care of Mark Alfonsounique @ approximately 0382; extubated on room air; CIWA scale Q6H per protocol; neuro checks Q4H; lactulose PO BID; up to [facility] w/assist; MRI coompleted      Please see flow-sheets for full assessments and/or additional information        P Monitor electrolytes and administer replacement therapy as ordered  Outcome: Progressing     Problem: METABOLIC/FLUID AND ELECTROLYTES - ADULT  Goal: Glucose maintained within prescribed range  Description  INTERVENTIONS:  - Monitor Blood Glucose as ordere minimize risk of hemorrhage  - Monitor temperature, glucose, and sodium.  Initiate appropriate interventions as ordered  Outcome: Progressing  Goal: Absence of seizures  Description  INTERVENTIONS  - Monitor for seizure activity  - Administer anti-seizure m grooming, and bathing  - Educate and encourage patient/family in tolerated functional activity level and precautions during self-care  Outcome: Progressing     Problem: Impaired Swallowing  Goal: Minimize aspiration risk  Description  Interventions:  - Gerri Fontaine

## 2019-04-27 VITALS
DIASTOLIC BLOOD PRESSURE: 81 MMHG | RESPIRATION RATE: 21 BRPM | OXYGEN SATURATION: 97 % | SYSTOLIC BLOOD PRESSURE: 123 MMHG | BODY MASS INDEX: 20.25 KG/M2 | WEIGHT: 118.63 LBS | HEIGHT: 64 IN | TEMPERATURE: 99 F | HEART RATE: 75 BPM

## 2019-04-27 PROCEDURE — 99233 SBSQ HOSP IP/OBS HIGH 50: CPT | Performed by: OTHER

## 2019-04-27 RX ORDER — LACTULOSE 20 G/30ML
20 SOLUTION ORAL DAILY
Qty: 946 ML | Refills: 2 | Status: SHIPPED | OUTPATIENT
Start: 2019-04-28 | End: 2019-05-21

## 2019-04-27 RX ORDER — WARFARIN SODIUM 7.5 MG/1
3.75 TABLET ORAL
Status: DISCONTINUED | OUTPATIENT
Start: 2019-04-27 | End: 2019-04-27

## 2019-04-27 RX ORDER — POTASSIUM CHLORIDE 20 MEQ/1
40 TABLET, EXTENDED RELEASE ORAL EVERY 4 HOURS
Status: DISCONTINUED | OUTPATIENT
Start: 2019-04-27 | End: 2019-04-27

## 2019-04-27 RX ORDER — DOXEPIN HYDROCHLORIDE 50 MG/1
1 CAPSULE ORAL DAILY
Qty: 90 TABLET | Refills: 1 | Status: SHIPPED | OUTPATIENT
Start: 2019-04-28 | End: 2019-10-29

## 2019-04-27 RX ORDER — LEVETIRACETAM 500 MG/1
500 TABLET ORAL 2 TIMES DAILY
Qty: 60 TABLET | Refills: 0 | Status: SHIPPED | OUTPATIENT
Start: 2019-04-27 | End: 2019-05-29

## 2019-04-27 RX ORDER — FAMOTIDINE 20 MG/1
20 TABLET ORAL 2 TIMES DAILY
Qty: 60 TABLET | Refills: 2 | Status: SHIPPED | OUTPATIENT
Start: 2019-04-27 | End: 2019-12-20 | Stop reason: CLARIF

## 2019-04-27 NOTE — PLAN OF CARE
A/O x4. VSS. NSR on tele. Room air. CIWAs remain less than 6 x 24 hours. DC per protocol. Neuro checks Q4. Patient has some left visual loss d/t previous stroke  Warfarin restarted tonight. INR goal 2-3. Generalized weakness.  Up w/ walker & 1 assist. Be and optimal renal function maintained  Description  INTERVENTIONS:  - Monitor labs and assess for signs and symptoms of volume excess or deficit  - Monitor intake, output and patient weight  - Monitor urine specific gravity, serum osmolarity and serum sodi and changes in neurological status  - Encourage and assist patient to increase activity and self care with guidance from PT/OT  - Encourage visually impaired, hearing impaired and aphasic patients to use assistive/communication devices  Outcome: Progressin

## 2019-04-27 NOTE — DISCHARGE SUMMARY
General Medicine Discharge Summary     Patient ID:  Yamila Jules  47year old  5/5/1965    Admit date: 4/23/2019    Discharge date and time: 4/27/19    Attending Physician: Og Sarmiento MD     Primary Care Physician: Ruth Carrera DO     Reason Monday.       Consults: IP CONSULT TO NEUROLOGY  IP CONSULT TO PULMONOLOGY  IP CONSULT TO HOSPITALIST  IP CONSULT TO PHARMACY  IP CONSULT TO GASTROENTEROLOGY  IP CONSULT TO SPEECH LANGUAGE PATHOLOGY  IP CONSULT TO CARDIOLOGY  IP CONSULT TO OCCUPATIONAL THER steatosis. Dictated by: Moshe Lovelace MD on 4/23/2019 at 21:13     Approved by: Moshe Lovelace MD            Xr Chest Ap Portable  (cpt=71045)    Result Date: 4/24/2019  CONCLUSION:  No lobar consolidation. No measurable pneumothorax.     Dictated tablet (500 mg total) by mouth 2 (two) times daily. famoTIDine 20 MG Oral Tab  Take 1 tablet (20 mg total) by mouth 2 (two) times daily. lactulose 20 GM/30ML Oral Solution  Take 30 mL (20 g total) by mouth daily.     multivitamin Oral Tab  Take 1 tabl

## 2019-04-27 NOTE — PROGRESS NOTES
[facility]  Progress Note    Rafael Holcomb Patient Status:  Inpatient    1965 MRN [id number]   [facility] 3NE-A Attending Stanley Brown MD   [facility] Day # 4 PCP Jayshree Khan DO     Subjective:  Muscles aching.   Otherwise fe unspecified(486)    • Psychiatric disorder    • Seizure disorder [facility])    • Stroke ([facility] 04/2015   • Unspecified essential hypertension      Past Surgical History:   Procedure Laterality Date   • ANGIOGRAM  11/13/13    minimal CAD in the RCA   • ANGIOPLAST infusion. PATIENT STATED HISTORY:(As transcribed by Technologist)  Extensive history of seizures. History of stroke 2015.       CONTRAST USED:  11 mL of Dotarem     FINDINGS:    INTRACRANIAL:  There is old infarction in the right occipital and posterior at baseline nutritional status. JOSE comprehensive is pending. 3. Outpt egd and colonoscopy. 4. Etoh abstinence. 5. Follow up with Dr. Angelica Villagran in 4 - 6 weeks. 6. Ok to discharge to home from gi standpoint.     Brigitte Christine  4/27/2019  9:41 AM

## 2019-04-27 NOTE — PROGRESS NOTES
[id number] [facility] Neurology Progress Note    Howie Sanchez Patient Status:  Inpatient    1965 MRN [id number]   [facility] 3NE-A Attending Montse Brunson MD   [facility] Day # 4 PCP Jefe Frost DO     Subjective:  Wolfgang Stafford 04/27/2019    INR 1.17 04/27/2019    PTP 15.5 04/27/2019    PHOS 1.4 04/27/2019     Meds:  • Warfarin Sodium  3.75 mg Oral Once at night   • Potassium Chloride ER  40 mEq Oral Q4H   • potassium & sodium phosphates  1 packet Oral TID CC   • levETIRAcetam  5 stable  Principal Problem:    [facility] ([facility]  Active Problems:    Hyperglycemia    Acute kidney injury ([facility]    Metabolic acidosis    Metabolic alkalosis    Encephalopathy, ALCOHOLIC    Encephalopathy, HEPATIC    Left arm weakness    Please see a

## 2019-04-27 NOTE — PROGRESS NOTES
Pharmacy Dosing Service: Warfarin (Coumadin)  Burek Patiño is a 47year old female with history of cardio-embolic CVAs (on outpatient coumadin) for whom pharmacy has been dosing warfarin (Coumadin) per consult from Dr John Ivey on 4/26/19.  Goal INR i

## 2019-04-27 NOTE — PROGRESS NOTES
[facility] Hospitalist Progress Note     Jojo Griffin Patient Status:  Inpatient    1965 MRN [id number]   [facility] 6NE-A Attending Sera Yuan MD   [facility] Day # 3 PCP Allan Pascual DO     CC: follow up    SUBJECTIVE:  Sitting up in 4 (L)   CREATININE      0.55 - 1.02 mg/dL 0.50 (L)   BUN/CREAT Ratio      10.0 - 20.0 8.0 (L)   CALCIUM      8.5 - 10.1 mg/dL 7.1 (L)   CALCULATED OSMOLALITY      275 - 295 mOsm/kg 276   GFR, Non-      >=60 112   GFR, -American discussed with patient and/or family by bedside.     Kodi Storm MD   [facility] Hospitalist  [phone]

## 2019-04-27 NOTE — PROGRESS NOTES
[facility] Cardiology Progress Note        Caspercarolann Perkinsmarycruz Patient Status:  Inpatient    1965 MRN [id number]   [facility] 6NE-A Attending Briseida Muhammad MD   [address] Day # 4 PCP Venkat Tellez DO     Subjective:  T Soft, non-tender. Extremities: No edema  Neurologic: no focal deficits  Skin: Warm and dry.      Telemetry: sinus    EKG:      Echo:      Cardiac Cath:      Labs:  HEM:  Recent Labs   Lab 04/23/19  1818 04/24/19  0416 04/25/19  0405 04/26/19  0358 04/27/1 due to ETOH.    9. She stopped coumadin 9/16, now back on it. Plan:  Stable for d/c my standpoint. INR today and daily.   Pharmacy to dose coumadin  Resumed lisinopril        Quin Smith

## 2019-04-27 NOTE — PROGRESS NOTES
NURSING DISCHARGE NOTE    Discharged Home via Ambulatory. Accompanied by Spouse  Belongings Taken by patient/family. Pt assessed and ready for discharge. Discharge instructions and medications discussed in depth with patient and her spouse.   All q

## 2019-04-29 NOTE — PAYOR COMM NOTE
--------------  CONTINUED STAY REVIEW    Payor: Arinane Laughlin  #:  [id number]  Authorization Number: [id number]     Admit date: 4/23/19  Admit time: 2103    Admitting Physician: Vernell Gamino MD  Attending Physician:  Nazia attMauricio Bacon   [place] 7.1 04/26/2019      Lab Results   Component Value Date     PT 15.2 (H) 07/28/2014     PT 13.6 11/14/2013     PT 14.1 11/13/2013     INR 3.03 (H) 04/25/2019     INR 3.30 (H) 04/24/2019     INR 2.56 (H) 04/23/2019         Imaging: I have independently       Continuous Infusions:  • sodium chloride Stopped (04/24/19 5298)            Allergies:     Penicillins             RASH    Comment:Once when a child. Got a dose as an adult with no             reaction.  Was on zosyn for UTI during admission activity off of sedation.   - Neuro checks Q 4hr  - MRI brain imaging was reviewed in detail.  No acute process  - Continue CIWA as per protocol  - Continue Thiamine and Folic acid  - PT/OT     Cardiac:  · Systolic BP Goal as per cardiology        -     Esthela MCV      80.0 - 100.0 fL 98.3   MCH      26.0 - 34.0 pg 33.9   MCHC      31.0 - 37.0 g/dL 34.5   RDW      11.0 - 15.0 % 14.1   RDW-SD      35.1 - 46.3 fL 51.0 (H)   Prelim Neutrophil Abs      1.50 - 7.70 x10 (3) uL 4.08   Neutrophils Absolute      1.50 - Seizure  - suspect etoh related  - apprec neuro recs  - CT head independently reviewed: no acute process  - MRI imaging reviewed: no acute process  - EEG without seizures     # Acute respiratory failure, 2/2 AMS  - s/p extubation  - apprec pulm recs  - CXR and complex computation, slightly delayed on complex computation  Attention/concentration: Normal, able to follow one and two step commands. Face is symmetrical.   PERRLA +3 brisk. EOMI. Tongue midline. Uvula and palate elevate symmetrically. resumed 4/25                - lisinopril resumed  8. GI following                - outpatient EGD and colonoscopy                - managing lactulose  4/27 CARDIOLOGY NOTE        Impression:  1.  Seizures with subsequent unresponsiveness requiring intubatio Etoh abstinence. 5. Follow up with Dr. Senthil Lyn in 4 - 6 weeks.   10. Ok to discharge to home from gi standpoint.    --------------  DISCHARGE REVIEW    Payor: Arianne Zapata #:  [id number]  Authorization Number: [id number]     Admit date: 4/    # Etoh abuse  - thiamine, folate, MVT  - psych liaison saw  - CIWA - > can d/c as no ativan requirement x >24 hours     # Transaminitis, elevated ammonia  - suspect 2/2 etoh   - also as borderline low ceruloplasmin and + JOSE  - abd u/s with fatty infi limiting evaluation. Please refer to dedicated CT of the abdomen and pelvis performed 4/23/2019 for further details.     Dictated by: Kristy Landin MD on 4/24/2019 at 16:41     Approved by: Kristy Landin MD            Mri Brain (w+wo) (CHK=02221)    Result Date: (w Iv)(cpt=70496/66932)    Result Date: 4/23/2019  CONCLUSION:   1. No evidence of intracranial aneurysm, flow-limiting stenosis, or focal arterial occlusion.   2. No evidence of occlusion, dissection, or flow-limiting stenosis in the cervical vertebral or Date Time Provider David Sharp   7/31/2019  9:40 AM MD EDUARDA Bush   10/14/2019  2:00 PM DO MAYRA Mayen       Orders Placed This Encounter      Consult to Psych Liaison/Psych Consult      Fo

## 2019-05-02 ENCOUNTER — LAB ENCOUNTER (OUTPATIENT)
Dept: LAB | Facility: HOSPITAL | Age: 54
End: 2019-05-02
Attending: FAMILY MEDICINE
Payer: COMMERCIAL

## 2019-05-02 DIAGNOSIS — F10.20 ALCOHOLISM (HCC): ICD-10-CM

## 2019-05-02 DIAGNOSIS — R19.7 DIARRHEA, UNSPECIFIED TYPE: ICD-10-CM

## 2019-05-02 DIAGNOSIS — I63.331 CEREBRAL INFARCTION DUE TO THROMBOSIS OF RIGHT POSTERIOR CEREBRAL ARTERY (HCC): ICD-10-CM

## 2019-05-02 DIAGNOSIS — Z51.81 ENCOUNTER FOR THERAPEUTIC DRUG MONITORING: ICD-10-CM

## 2019-05-02 DIAGNOSIS — Z79.01 LONG TERM CURRENT USE OF ANTICOAGULANT THERAPY: ICD-10-CM

## 2019-05-02 DIAGNOSIS — Z79.01 LONG TERM (CURRENT) USE OF ANTICOAGULANTS: ICD-10-CM

## 2019-05-02 PROCEDURE — 36415 COLL VENOUS BLD VENIPUNCTURE: CPT

## 2019-05-02 PROCEDURE — 80076 HEPATIC FUNCTION PANEL: CPT

## 2019-05-02 PROCEDURE — 85610 PROTHROMBIN TIME: CPT

## 2019-05-02 PROCEDURE — 84100 ASSAY OF PHOSPHORUS: CPT

## 2019-05-02 PROCEDURE — 82140 ASSAY OF AMMONIA: CPT

## 2019-05-03 DIAGNOSIS — E78.2 MIXED HYPERLIPIDEMIA: ICD-10-CM

## 2019-05-03 RX ORDER — ATORVASTATIN CALCIUM 40 MG/1
TABLET, FILM COATED ORAL
Qty: 30 TABLET | Refills: 0 | Status: SHIPPED | OUTPATIENT
Start: 2019-05-03 | End: 2019-05-31

## 2019-05-03 NOTE — TELEPHONE ENCOUNTER
Medication: Atorvastatin    Date of last refill: 04/01/2019 (#30/0)  [place]    Last office visit: 4/10/2019  Due back to clinic per last office note: 6 months  Date next office visit scheduled:  10/14/2019  Future Appoi

## 2019-05-08 ENCOUNTER — OFFICE VISIT (OUTPATIENT)
Dept: PHYSICAL THERAPY | Age: 54
End: 2019-05-08
Attending: INTERNAL MEDICINE
Payer: COMMERCIAL

## 2019-05-08 DIAGNOSIS — R29.898 LEFT ARM WEAKNESS: ICD-10-CM

## 2019-05-08 DIAGNOSIS — G40.901 STATUS EPILEPTICUS (HCC): ICD-10-CM

## 2019-05-08 DIAGNOSIS — I63.50 CEREBRAL ARTERY OCCLUSION WITH CEREBRAL INFARCTION (HCC): ICD-10-CM

## 2019-05-08 PROCEDURE — 97112 NEUROMUSCULAR REEDUCATION: CPT

## 2019-05-08 PROCEDURE — 97161 PT EVAL LOW COMPLEX 20 MIN: CPT

## 2019-05-08 NOTE — PROGRESS NOTES
FALL SCREEN EVALUATION   Referring Physician: Dr. Mandujano ref.  provider found  Diagnosis: s/p seizure, s/p stroke 1 year ago, fall risk/balance impairments, L visual field cut     Date of Service: 5/8/2019     PATIENT SUMMARY   Renate Guardado is a 49 year History:   Diagnosis Date   • Cardiomyopathy ([facility] 11/13    ?etoh   • CORONARY ARTERY DISEASE    • Depression    • Heart attack ([facility] 05/21/2012   • History of blood clots     legs; heart; head   • History of ETOH abuse    • Pneumonia, organism unspecified fall risk- 4 pts)  ___3___1. GAIT LEVEL SURFACE  Instructions: Walk at your normal speed from here to the next rose (20 ft).   (3) Normal—Walks 6 m (20 ft) in less than 5.5 seconds, no assistive devices, good speed, no evidence for imbalance, normal gait pa 25.4–38.1 cm (10–15 in) outside the 30.48-cm (12-in) walkway width, or changes speed but loses balance but is able to recover and continue walking.  (0) Severe impairment—Cannot change speeds, deviates greater than 38.1 cm (15 in) outside 30.48-cm (12-in) completed  2 repetitions in each direction. (3) Normal—Performs head turns with no change in gait. Deviates no more than 15.24 cm (6 in) outside 30.48-cm (12-in) walkway width.   (2) Mild impairment—Performs task with slight change in gait velocity (eg, mi impairment—Is able to step over one shoe box (11.43 cm [4.5 in] total height) but must slow down and adjust steps to clear box safely. May require verbal cueing.  (0) Severe impairment—Cannot perform without assistance.  ___1____7.  GAIT WITH NARROW BASE OF good speed, no evidence for imbalance, normal gait pattern, deviates no more than 15.24 cm (6 in) outside 30.48-cm (12-in) walkway width.   (2) Mild impairment—Walks 6 m (20 ft), uses assistive device, slower speed, mild gait deviations, deviates 15.24–25.4 squat to  light objects around the house without loss of stability  · Pt will improve functional hip strength to demonstrate ability to ascend/descend 1 flight of stairs reciprocally without use of handrail  · Pt will be independent and compliant wi

## 2019-05-13 ENCOUNTER — OFFICE VISIT (OUTPATIENT)
Dept: PHYSICAL THERAPY | Age: 54
End: 2019-05-13
Attending: INTERNAL MEDICINE
Payer: COMMERCIAL

## 2019-05-13 PROCEDURE — 97112 NEUROMUSCULAR REEDUCATION: CPT

## 2019-05-13 NOTE — PROGRESS NOTES
Dx: s/p seizure, s/p stroke 1 year ago, fall risk/balance impairments, L visual field cut           Authorized # of Visits:  61 /year         Next MD visit: Dr. Hannah Simpson 16th   Fall Risk: standard        Precautions: stroke 1 year ago; and seizures 1 mo ag hurdles x 3 down and back; lateral 3 down and back         Sit to stand 10 reps x 2 sets         Step ups 6 inch 10 reps R/L x 2 sets no UE assist- to little UE assist         SLS 1 min x 2 sets R/L- VCs to keep LEs from touching         Tandem stance 1 mi

## 2019-05-15 ENCOUNTER — OFFICE VISIT (OUTPATIENT)
Dept: PHYSICAL THERAPY | Age: 54
End: 2019-05-15
Attending: INTERNAL MEDICINE
Payer: COMMERCIAL

## 2019-05-15 PROCEDURE — 97112 NEUROMUSCULAR REEDUCATION: CPT

## 2019-05-15 NOTE — PROGRESS NOTES
Dx: s/p seizure, s/p stroke 1 year ago, fall risk/balance impairments, L visual field cut           Authorized # of Visits:  61 /year         Next MD visit: Dr. Lluvia Guo 16th   Fall Risk: standard        Precautions: stroke 1 year ago; and seizures 1 mo ag Walking with vertical head turns 30'x 4 laps        Walking tandem 30' x 4 laps Walking tandem 30' x 4 laps        Walking fast 30' x 2 laps; slow 30' x 2 laps Ladder drill diagonal 3 laps down and back         Hurdles forward 5 hurdles x 3 down and back;

## 2019-05-20 ENCOUNTER — OFFICE VISIT (OUTPATIENT)
Dept: PHYSICAL THERAPY | Age: 54
End: 2019-05-20
Attending: INTERNAL MEDICINE
Payer: COMMERCIAL

## 2019-05-20 PROCEDURE — 97112 NEUROMUSCULAR REEDUCATION: CPT

## 2019-05-20 NOTE — PROGRESS NOTES
Dx: s/p seizure, s/p stroke 1 year ago, fall risk/balance impairments, L visual field cut           Authorized # of Visits:  61 /year         Next MD visit: Dr. Almetta Hammans tomorrow  Fall Risk: standard        Precautions: stroke 1 year ago; and seizures 1 mo Change in frequency: no; Plan for next session: progress to challenge various balance systems- walking in open environment    Date: 5/13/2019  Tx#: 2/8 Date: 5/15/2019   Tx#: 3/8  Date: 5/20/2019   Tx#: 4/8  Date: Tx#: 5/ Date: Tx#: 6/ Date:    Tx#: 7/ tandem   5/15/2019 add head turns to narrow RIGOBERTO on foam if needed  5/20/2019 add head turns to narrow RIGOBERTO on foam; more conscious head turns for room scanning   Charges: neuro ernesto: 3       Total Timed Treatment: 40 min  Total Treatment Time: 40 min

## 2019-05-21 ENCOUNTER — LAB ENCOUNTER (OUTPATIENT)
Dept: LAB | Facility: HOSPITAL | Age: 54
End: 2019-05-21
Attending: INTERNAL MEDICINE
Payer: COMMERCIAL

## 2019-05-21 DIAGNOSIS — R74.01 NONSPECIFIC ELEVATION OF LEVELS OF TRANSAMINASE OR LACTIC ACID DEHYDROGENASE (LDH): ICD-10-CM

## 2019-05-21 DIAGNOSIS — R74.02 NONSPECIFIC ELEVATION OF LEVELS OF TRANSAMINASE OR LACTIC ACID DEHYDROGENASE (LDH): ICD-10-CM

## 2019-05-21 PROCEDURE — 82140 ASSAY OF AMMONIA: CPT

## 2019-05-21 PROCEDURE — 80053 COMPREHEN METABOLIC PANEL: CPT

## 2019-05-21 PROCEDURE — 36415 COLL VENOUS BLD VENIPUNCTURE: CPT

## 2019-05-21 NOTE — PROGRESS NOTES
Ammonia level normal.    Stay off Lactulose. Repeat labs in 2 weeks. Ordered. Left message on private voice mail with results.   Sent to [address]

## 2019-05-22 ENCOUNTER — OFFICE VISIT (OUTPATIENT)
Dept: PHYSICAL THERAPY | Age: 54
End: 2019-05-22
Attending: INTERNAL MEDICINE
Payer: COMMERCIAL

## 2019-05-22 PROCEDURE — 97112 NEUROMUSCULAR REEDUCATION: CPT

## 2019-05-22 NOTE — PROGRESS NOTES
Dx: s/p seizure, s/p stroke 1 year ago, fall risk/balance impairments, L visual field cut           Authorized # of Visits:  61 /year         Next MD visit: Dr. King Al tomorrow  Fall Risk: standard        Precautions: stroke 1 year ago; and seizures 1 mo level 2- 5 min       Walking with horizontal head turns 30' x 4 laps  Walking with horizontal head turns 30' x 4 laps  X 30' x 4 reps  X 30' x 4 reps      Walking with vertical head turns 30'x 4 laps  Walking with vertical head turns 30'x 4 laps X 30' x 4 turns to narrow RIGOBERTO on foam if needed  5/20/2019 add head turns to narrow RIGOBERTO on foam; more conscious head turns for room scanning   Charges: neuro ernesto: 3       Total Timed Treatment: 40 min  Total Treatment Time: 40 min

## 2019-05-28 ENCOUNTER — OFFICE VISIT (OUTPATIENT)
Dept: PHYSICAL THERAPY | Age: 54
End: 2019-05-28
Attending: INTERNAL MEDICINE
Payer: COMMERCIAL

## 2019-05-28 PROCEDURE — 97112 NEUROMUSCULAR REEDUCATION: CPT

## 2019-05-28 NOTE — PROGRESS NOTES
Dx: s/p seizure, s/p stroke 1 year ago, fall risk/balance impairments, L visual field cut           Authorized # of Visits:  61 /year         Next MD visit: Dr. King Al tomorrow  Fall Risk: standard        Precautions: stroke 1 year ago; and seizures 1 mo Deviates no more than 15.24 cm (6 in) outside 30.48-cm (12-in) walkway width.  ___3____7.  GAIT WITH NARROW BASE OF SUPPORT  Instructions: Walk on the floor with arms folded across the chest, feet aligned heel to toe in tandem for a distance of 3.6 m [12 ft and busy environments- MET  · Pt will be able to squat to  light objects around the house without loss of stability- MET  · Pt will improve functional hip strength to demonstrate ability to ascend/descend 1 flight of stairs reciprocally without use throw with L cervical rotation narrow RIGOBERTO 30 sec x 2; semi tandem 30 sec x 2 sets  Standing Narrow RIGOBERTO with head turns 1 min ; on foam 2 min  Pt education; HEP and carryover, recommendations and benefit for updated activities to work toward specific goals,

## 2019-05-29 NOTE — TELEPHONE ENCOUNTER
Medication: Levetiracetam 500mg    Date of last refill: 4/27/19 (#60/0)  [place]     Last office visit: 4/10/2019  Due back to clinic per last office note:  6 months  Date next office visit scheduled:    Future Appointme

## 2019-05-31 DIAGNOSIS — E78.2 MIXED HYPERLIPIDEMIA: ICD-10-CM

## 2019-05-31 RX ORDER — ATORVASTATIN CALCIUM 40 MG/1
TABLET, FILM COATED ORAL
Qty: 30 TABLET | Refills: 2 | Status: SHIPPED | OUTPATIENT
Start: 2019-05-31 | End: 2019-08-23 | Stop reason: CLARIF

## 2019-06-15 ENCOUNTER — LAB ENCOUNTER (OUTPATIENT)
Dept: LAB | Facility: HOSPITAL | Age: 54
End: 2019-06-15
Attending: INTERNAL MEDICINE
Payer: COMMERCIAL

## 2019-06-15 DIAGNOSIS — Z79.01 LONG TERM (CURRENT) USE OF ANTICOAGULANTS: ICD-10-CM

## 2019-06-15 DIAGNOSIS — I63.331 CEREBRAL INFARCTION DUE TO THROMBOSIS OF RIGHT POSTERIOR CEREBRAL ARTERY (HCC): ICD-10-CM

## 2019-06-15 PROCEDURE — 85610 PROTHROMBIN TIME: CPT

## 2019-06-15 PROCEDURE — 36415 COLL VENOUS BLD VENIPUNCTURE: CPT

## 2019-06-17 ENCOUNTER — LAB ENCOUNTER (OUTPATIENT)
Dept: LAB | Facility: HOSPITAL | Age: 54
End: 2019-06-17
Attending: INTERNAL MEDICINE
Payer: COMMERCIAL

## 2019-06-17 DIAGNOSIS — Z51.81 ENCOUNTER FOR THERAPEUTIC DRUG MONITORING: ICD-10-CM

## 2019-06-17 DIAGNOSIS — Z79.01 LONG TERM (CURRENT) USE OF ANTICOAGULANTS: ICD-10-CM

## 2019-06-17 DIAGNOSIS — I63.331 CEREBRAL INFARCTION DUE TO THROMBOSIS OF RIGHT POSTERIOR CEREBRAL ARTERY (HCC): ICD-10-CM

## 2019-06-17 PROCEDURE — 36415 COLL VENOUS BLD VENIPUNCTURE: CPT

## 2019-06-17 PROCEDURE — 85610 PROTHROMBIN TIME: CPT

## 2019-06-19 ENCOUNTER — LAB ENCOUNTER (OUTPATIENT)
Dept: LAB | Facility: HOSPITAL | Age: 54
End: 2019-06-19
Attending: INTERNAL MEDICINE
Payer: COMMERCIAL

## 2019-06-19 DIAGNOSIS — Z79.01 LONG TERM (CURRENT) USE OF ANTICOAGULANTS: ICD-10-CM

## 2019-06-19 DIAGNOSIS — I63.331 CEREBRAL INFARCTION DUE TO THROMBOSIS OF RIGHT POSTERIOR CEREBRAL ARTERY (HCC): ICD-10-CM

## 2019-06-19 PROCEDURE — 85610 PROTHROMBIN TIME: CPT

## 2019-06-19 PROCEDURE — 36415 COLL VENOUS BLD VENIPUNCTURE: CPT

## 2019-06-25 ENCOUNTER — OFFICE VISIT (OUTPATIENT)
Dept: NEUROLOGY | Facility: CLINIC | Age: 54
End: 2019-06-25
Payer: COMMERCIAL

## 2019-06-25 VITALS
BODY MASS INDEX: 21 KG/M2 | SYSTOLIC BLOOD PRESSURE: 118 MMHG | WEIGHT: 113 LBS | DIASTOLIC BLOOD PRESSURE: 84 MMHG | RESPIRATION RATE: 16 BRPM

## 2019-06-25 DIAGNOSIS — R56.9 SEIZURE DUE TO ALCOHOL WITHDRAWAL, UNCOMPLICATED (HCC): ICD-10-CM

## 2019-06-25 DIAGNOSIS — G40.901 STATUS EPILEPTICUS (HCC): ICD-10-CM

## 2019-06-25 DIAGNOSIS — F10.230 SEIZURE DUE TO ALCOHOL WITHDRAWAL, UNCOMPLICATED (HCC): ICD-10-CM

## 2019-06-25 DIAGNOSIS — I63.331 CEREBRAL INFARCTION DUE TO THROMBOSIS OF RIGHT POSTERIOR CEREBRAL ARTERY (HCC): Primary | ICD-10-CM

## 2019-06-25 DIAGNOSIS — R41.3 MEMORY LOSS: ICD-10-CM

## 2019-06-25 PROCEDURE — 99213 OFFICE O/P EST LOW 20 MIN: CPT | Performed by: OTHER

## 2019-06-25 RX ORDER — LEVETIRACETAM 500 MG/1
500 TABLET ORAL 2 TIMES DAILY
Qty: 60 TABLET | Refills: 3 | Status: ON HOLD | OUTPATIENT
Start: 2019-06-25 | End: 2019-08-24

## 2019-06-25 NOTE — PROGRESS NOTES
Neurology H&P    Kenith Landau [place] Patient Status:  No patient class for patient encounter    1965 MRN [id number]   Location ED [facility], [address], [address] [facility] Attending No att. providers found   Hosp Day # 0 PCP Yin Sotelo DO daily. Disp: 90 tablet Rfl: 0   folic acid 1 MG Oral Tab Take 1 tablet (1 mg total) by mouth once daily. Disp: 30 tablet Rfl: 5   Warfarin Sodium 7.5 MG Oral Tab Take 1 tablet by mouth nightly AS DIRECTED BY COUMADIN CLINIC (Patient taking differently:  Nolen Essex Heart attack ([facility] 05/21/2012   • History of blood clots     legs; heart; head   • History of ETOH abuse    • Pneumonia, organism unspecified(486)    • Psychiatric disorder    • Seizure disorder [facility])    • Stroke ([facility] 04/2015   • Unspecified essential hyp murmur  Pulm: CTAB  GI: non-tender, normal bowel sounds  Skin: normal, dry  Extremities: No clubbing or cyanosis      Neurologic:   MS: awake and alert and oriented x 3, speech is fluent and conversant    CN: PERRL, EOMI, L homonomous hemianopsia, Facial s dominant right vertebral artery are normal.  There is a small right [place], with dominant anterior inferior cerebellar arteries bilaterally. The left V4 segment is seen   via reflux and is relatively hypoplastic.   The basilar artery and superior cerebellar endovascular treatment. 2.  Luxury perfusion and consequent mild AV shunting is seen in the medial right temporal lobe as a consequence of previous ischemia and subsequent autolysis/migration of the previously occlusive embolus.      MRI Brain 7/18/18 Atorvastatin 40mg QHS  - Follow up with PCP and cardiology    Stroke Prevention  - Limit EtOH to no more than 2 drinks per day for men and 1 for women  - Follow  A mediterranean diet  - Abstain from tobacco products  - BP goals <140/90 optimally normotensi

## 2019-07-31 PROBLEM — I63.331 CEREBRAL INFARCTION DUE TO THROMBOSIS OF RIGHT POSTERIOR CEREBRAL ARTERY (HCC): Status: RESOLVED | Noted: 2018-07-17 | Resolved: 2019-07-31

## 2019-07-31 PROBLEM — Z79.01 LONG TERM (CURRENT) USE OF ANTICOAGULANTS: Status: RESOLVED | Noted: 2018-08-23 | Resolved: 2019-07-31

## 2019-07-31 PROCEDURE — 82140 ASSAY OF AMMONIA: CPT | Performed by: INTERNAL MEDICINE

## 2019-08-23 ENCOUNTER — APPOINTMENT (OUTPATIENT)
Dept: GENERAL RADIOLOGY | Facility: HOSPITAL | Age: 54
End: 2019-08-23
Attending: EMERGENCY MEDICINE
Payer: COMMERCIAL

## 2019-08-23 ENCOUNTER — APPOINTMENT (OUTPATIENT)
Dept: CT IMAGING | Facility: HOSPITAL | Age: 54
End: 2019-08-23
Attending: EMERGENCY MEDICINE
Payer: COMMERCIAL

## 2019-08-23 PROBLEM — G40.909 SEIZURE DISORDER (HCC): Status: ACTIVE | Noted: 2019-08-23

## 2019-08-23 PROCEDURE — 70450 CT HEAD/BRAIN W/O DYE: CPT | Performed by: EMERGENCY MEDICINE

## 2019-08-23 PROCEDURE — 71045 X-RAY EXAM CHEST 1 VIEW: CPT | Performed by: EMERGENCY MEDICINE

## 2019-08-23 NOTE — ED NOTES
1145: Patient started to have seizure-like activity, stiffness to the left arm on the way to CT. Patient brought to C4. Dr. Niki Feliz notified.

## 2019-08-23 NOTE — ED INITIAL ASSESSMENT (HPI)
Per EMS, patient started complaining of left sided weakness to the arm and leg, as well as facial droop. Patient reports symptoms started at 10 am, and that family was present at the time.  Last known normal was just before 10 am.

## 2019-08-23 NOTE — H&P
[facility] Hospitalist H&P       CC: seizure activity    PCP: Kartik Brown DO    History of Present Illness: Pt is a 48 yo with mmp including but not limited to CAD, alcohol cardiomyopathy, alcohol use disorder, cerebrovascular dz with hx CVA with residu Binge frequency: Daily or almost daily      Comment: 2 cans beer/day       Fam Hx  Family History   Problem Relation Age of Onset   • Hypertension Father    • Neurological Disorder Father         Parkinsons   • Neurological Disorder Mother         alzhemi Iv)(aix=[id number])    Result Date: 8/23/2019       CONCLUSION:  No acute process. Old territorial infarcts involving the right PCA and right MCA territories unchanged from prior scan of 4/23/2019.   Critical result findings were called by Dr. Lory Padilla to Dr. Nancy De Luna plan as outlined    Thank Deysi Jara MD  [facility] Hospitalist  Answering Service number: [phone]

## 2019-08-23 NOTE — SIGNIFICANT EVENT
Code Stroke paged received at [phone]. Patient met in C4 at 1185. Patient noted to have jerking movement in the left arm. Pt to [address] [facility]. Per EMS, NIHSS 4. My assessment included left sided weakness 2/5 on both uppers and lower extremities.    Per

## 2019-08-23 NOTE — PROGRESS NOTES
NURSING ADMISSION NOTE      Patient admitted via Cart  Oriented to room. Safety precautions initiated. Bed in low position. Call light in reach. Admission navigator completed  Seizure precautions initiated  Per , pt is daily drinker.  Drink

## 2019-08-23 NOTE — ED PROVIDER NOTES
Patient Seen in: [facility] Emergency Department    History   Patient presents with:  Stroke (neurologic)    Stated Complaint: stroke    HPI    52-year-old female comes to the hospital complaint of having a headache as well as nausea and vomiting. sounds without rebound, guarding or masses noted  Back nontender without CVA tenderness  Extremity no clubbing, cyanosis or edema noted. Full range of motion noted without tenderness  Neuro: Patient speech is slowed.   Patient's movements of her arms and l Final result                 Please view results for these tests on the individual orders. RAINBOW DRAW BLUE   RAINBOW DRAW LAVENDER   RAINBOW DRAW LIGHT GREEN   RAINBOW DRAW GOLD     EKG    Rate, intervals and axes as noted on EKG Report.   Rate: 95

## 2019-08-24 ENCOUNTER — APPOINTMENT (OUTPATIENT)
Dept: ULTRASOUND IMAGING | Facility: HOSPITAL | Age: 54
End: 2019-08-24
Attending: INTERNAL MEDICINE
Payer: COMMERCIAL

## 2019-08-24 PROCEDURE — 76705 ECHO EXAM OF ABDOMEN: CPT | Performed by: INTERNAL MEDICINE

## 2019-08-24 PROCEDURE — 76700 US EXAM ABDOM COMPLETE: CPT | Performed by: INTERNAL MEDICINE

## 2019-08-24 NOTE — PLAN OF CARE
NURSING DISCHARGE NOTE    Discharged Home via Wheelchair. Accompanied by Family member  Belongings Taken by patient/family. Patient given discharge paperwork.  at bedside. Instructed on follow ups and outpt lab orders.   Both verbalize unde

## 2019-08-24 NOTE — CONSULTS
[facility]    Report of Consultation    Trudy Barrera Zis Patient Status:  Observation    1965 MRN [id number]   [facility] 3NE-A Attending Vilma Barcenas MD   Hosp Day # 0 PCP Iman Michaud DO     Date of Admission:  2019 alzhemiers and Parkinsons      reports that she quit smoking about 22 years ago. She has a 7.50 pack-year smoking history. She has never used smokeless tobacco. She reports that she drinks about 2.0 standard drinks of alcohol per week.  She reports that she lisinopril (PRINIVIL,ZESTRIL) tab 5 mg, 5 mg, Oral, Nightly  •  Metoprolol Succinate ER (Toprol XL) 24 hr tab 25 mg, 25 mg, Oral, Nightly  •  [START ON 8/24/2019] Sertraline HCl (ZOLOFT) tab 100 mg, 100 mg, Oral, Daily    Review of Systems:  A 10-point sys [facility])     Cerebral artery occlusion with cerebral infarction (HCC)     Thrombocytopenia (HCC)     Intractable abdominal pain     Acute left-sided weakness     Hypokalemia     Chronic systolic (congestive) heart failure (HCC)     Dyslipidemia     Arterial h

## 2019-08-24 NOTE — CONSULTS
[facility]    Report of Gastroenterology Consultation    Charley Anderson Patient Status:  Observation    1965 MRN [id number]   [facility] 3NE-A Attending Jaycee Kerr MD   Hosp Day # 0 PCP Yves Rodriguez DO     Date of Admiss reports that she quit smoking about 22 years ago. She has a 7.50 pack-year smoking history. She has never used smokeless tobacco. She reports that she drinks about 2.0 standard drinks of alcohol per week. She reports that she does not use drugs.     Al mg, 5 mg, Oral, Nightly  •  Metoprolol Succinate ER (Toprol XL) 24 hr tab 25 mg, 25 mg, Oral, Nightly  •  Sertraline HCl (ZOLOFT) tab 100 mg, 100 mg, Oral, Daily  •  levETIRAcetam (KEPPRA) tab 750 mg, 750 mg, Oral, BID    Review of Systems:  Mercantila history of eating disorder. Skin: Denies severe itching, unusual moles, rash, flushing, change in hair or nails. Bone/joint: Denies arthritis, back pain, joint pain, osteoporosis.   Heme/Lymphatic: Denies easy bruising, anemia, excessive bleeding, enlargi viral hepatitis (CMV, EBV), autoimmune hepatitis.   Will do other serologies    Liver enzymes markedly improved overnight    US liver        Patient Active Problem List:     History of MI (myocardial infarction)     Cardiomyopathy ([facility]     Abdominal pain

## 2019-08-24 NOTE — PROGRESS NOTES
[facility] Hospitalist Progress Note                                                                   [address]  5/5/1965    SUBJECTIVE:  Pt seen and examined.   States she is doing better LORazepam **OR** LORazepam **OR** LORazepam **OR** LORazepam, cloNIDine HCl, haloperidol lactate, PEG 3350, magnesium hydroxide, bisacodyl, FLEET ENEMA, ondansetron HCl, Metoclopramide HCl    Assessment/Plan:  Principal Problem:    Seizure disorder ([facility] Hospitalist  Pager: [phone]

## 2019-08-24 NOTE — PLAN OF CARE
Resumed care of pt. At [address]. Pt. Is very drowsy but arousable. Ax3  Seizure precautions maintained-PO Keppra  CIWA Q2    Pt. Became more alert but pt. Is not following directions-very difficult to re-direct. Jan HARRINGTON, Tele: NS-ST, -pt.  Continua

## 2019-08-25 NOTE — DISCHARGE SUMMARY
General Medicine Discharge Summary     Patient ID:  Adonay Gastelum  49year old  5/5/1965    Admit date: 8/23/2019    Discharge date and time: 8/24/2019  3:26 PM     Attending Physician: Jayson Borden noncompliance with testing INRs     **cerebrovascular dz with hx CVA with residual L sided vision loss  -code CVA called in ED for L sided facial droop and weakness- canceled per chart review.  Not tPA candidate as on eliquis     **Major depression, recurre normal respiratory effort  CV: Heart with regular rate and rhythm, no murmur. Normal PMI.     Abd: Abdomen soft, nontender, nondistended, no organomegaly, bowel sounds present  MSK: Full range of motion in extremities, no clubbing, no cyanosis  Skin: no ra

## 2019-08-26 ENCOUNTER — APPOINTMENT (OUTPATIENT)
Dept: CT IMAGING | Facility: HOSPITAL | Age: 54
End: 2019-08-26
Attending: EMERGENCY MEDICINE
Payer: COMMERCIAL

## 2019-08-26 ENCOUNTER — HOSPITAL ENCOUNTER (EMERGENCY)
Facility: HOSPITAL | Age: 54
Discharge: HOME | End: 2019-08-26
Attending: EMERGENCY MEDICINE
Payer: COMMERCIAL

## 2019-08-26 VITALS
RESPIRATION RATE: 26 BRPM | WEIGHT: 111 LBS | OXYGEN SATURATION: 100 % | HEIGHT: 62 IN | TEMPERATURE: 99 F | BODY MASS INDEX: 20.43 KG/M2 | HEART RATE: 65 BPM | SYSTOLIC BLOOD PRESSURE: 138 MMHG | DIASTOLIC BLOOD PRESSURE: 90 MMHG

## 2019-08-26 DIAGNOSIS — N30.00 ACUTE CYSTITIS WITHOUT HEMATURIA: Primary | ICD-10-CM

## 2019-08-26 LAB
ALBUMIN SERPL-MCNC: 3 G/DL (ref 3.4–5)
ALBUMIN/GLOB SERPL: 0.7 {RATIO} (ref 1–2)
ALP LIVER SERPL-CCNC: 93 U/L (ref 41–108)
ALT SERPL-CCNC: 344 U/L (ref 13–56)
AMMONIA PLAS-MCNC: 35 UMOL/L (ref 11–32)
ANION GAP SERPL CALC-SCNC: 6 MMOL/L (ref 0–18)
APTT PPP: 32.1 SECONDS (ref 25.4–36.1)
AST SERPL-CCNC: 1317 U/L (ref 15–37)
BASOPHILS # BLD AUTO: 0.06 X10(3) UL (ref 0–0.2)
BASOPHILS NFR BLD AUTO: 0.9 %
BILIRUB SERPL-MCNC: 1.6 MG/DL (ref 0.1–2)
BILIRUB UR QL STRIP.AUTO: NEGATIVE
BUN BLD-MCNC: 11 MG/DL (ref 7–18)
BUN/CREAT SERPL: 14.5 (ref 10–20)
CALCIUM BLD-MCNC: 8.4 MG/DL (ref 8.5–10.1)
CHLORIDE SERPL-SCNC: 104 MMOL/L (ref 98–112)
CO2 SERPL-SCNC: 28 MMOL/L (ref 21–32)
CREAT BLD-MCNC: 0.76 MG/DL (ref 0.55–1.02)
DEPRECATED RDW RBC AUTO: 47 FL (ref 35.1–46.3)
EOSINOPHIL # BLD AUTO: 0.04 X10(3) UL (ref 0–0.7)
EOSINOPHIL NFR BLD AUTO: 0.6 %
ERYTHROCYTE [DISTWIDTH] IN BLOOD BY AUTOMATED COUNT: 13 % (ref 11–15)
ETHANOL SERPL-MCNC: <3 MG/DL (ref ?–3)
GLOBULIN PLAS-MCNC: 4.1 G/DL (ref 2.8–4.4)
GLUCOSE BLD-MCNC: 108 MG/DL (ref 70–99)
GLUCOSE UR STRIP.AUTO-MCNC: NEGATIVE MG/DL
HCT VFR BLD AUTO: 32.1 % (ref 35–48)
HGB BLD-MCNC: 11.3 G/DL (ref 12–16)
IMM GRANULOCYTES # BLD AUTO: 0.08 X10(3) UL (ref 0–1)
IMM GRANULOCYTES NFR BLD: 1.3 %
INR BLD: 1.37 (ref 0.9–1.1)
KETONES UR STRIP.AUTO-MCNC: NEGATIVE MG/DL
LIPASE SERPL-CCNC: 718 U/L (ref 73–393)
LYMPHOCYTES # BLD AUTO: 1.73 X10(3) UL (ref 1–4)
LYMPHOCYTES NFR BLD AUTO: 27.1 %
M PROTEIN MFR SERPL ELPH: 7.1 G/DL (ref 6.4–8.2)
MCH RBC QN AUTO: 35.2 PG (ref 26–34)
MCHC RBC AUTO-ENTMCNC: 35.2 G/DL (ref 31–37)
MCV RBC AUTO: 100 FL (ref 80–100)
MONOCYTES # BLD AUTO: 0.39 X10(3) UL (ref 0.1–1)
MONOCYTES NFR BLD AUTO: 6.1 %
NEUTROPHILS # BLD AUTO: 4.08 X10 (3) UL (ref 1.5–7.7)
NEUTROPHILS # BLD AUTO: 4.08 X10(3) UL (ref 1.5–7.7)
NEUTROPHILS NFR BLD AUTO: 64 %
NITRITE UR QL STRIP.AUTO: POSITIVE
OSMOLALITY SERPL CALC.SUM OF ELEC: 286 MOSM/KG (ref 275–295)
PH UR STRIP.AUTO: 5 [PH] (ref 4.5–8)
PLATELET # BLD AUTO: 84 10(3)UL (ref 150–450)
POTASSIUM SERPL-SCNC: 3.2 MMOL/L (ref 3.5–5.1)
PROT UR STRIP.AUTO-MCNC: 100 MG/DL
PSA SERPL DL<=0.01 NG/ML-MCNC: 17.6 SECONDS (ref 12.5–14.7)
RBC # BLD AUTO: 3.21 X10(6)UL (ref 3.8–5.3)
SODIUM SERPL-SCNC: 138 MMOL/L (ref 136–145)
SP GR UR STRIP.AUTO: 1.02 (ref 1–1.03)
UROBILINOGEN UR STRIP.AUTO-MCNC: 4 MG/DL
WBC # BLD AUTO: 6.4 X10(3) UL (ref 4–11)
WBC CLUMPS UR QL AUTO: PRESENT

## 2019-08-26 PROCEDURE — 80320 DRUG SCREEN QUANTALCOHOLS: CPT | Performed by: EMERGENCY MEDICINE

## 2019-08-26 PROCEDURE — 85610 PROTHROMBIN TIME: CPT | Performed by: EMERGENCY MEDICINE

## 2019-08-26 PROCEDURE — 87186 SC STD MICRODIL/AGAR DIL: CPT | Performed by: EMERGENCY MEDICINE

## 2019-08-26 PROCEDURE — 85730 THROMBOPLASTIN TIME PARTIAL: CPT | Performed by: EMERGENCY MEDICINE

## 2019-08-26 PROCEDURE — 82140 ASSAY OF AMMONIA: CPT | Performed by: EMERGENCY MEDICINE

## 2019-08-26 PROCEDURE — 87086 URINE CULTURE/COLONY COUNT: CPT | Performed by: EMERGENCY MEDICINE

## 2019-08-26 PROCEDURE — 70450 CT HEAD/BRAIN W/O DYE: CPT | Performed by: EMERGENCY MEDICINE

## 2019-08-26 PROCEDURE — 93005 ELECTROCARDIOGRAM TRACING: CPT

## 2019-08-26 PROCEDURE — 80053 COMPREHEN METABOLIC PANEL: CPT | Performed by: EMERGENCY MEDICINE

## 2019-08-26 PROCEDURE — 81001 URINALYSIS AUTO W/SCOPE: CPT | Performed by: EMERGENCY MEDICINE

## 2019-08-26 PROCEDURE — 83690 ASSAY OF LIPASE: CPT | Performed by: EMERGENCY MEDICINE

## 2019-08-26 PROCEDURE — 93010 ELECTROCARDIOGRAM REPORT: CPT

## 2019-08-26 PROCEDURE — 99285 EMERGENCY DEPT VISIT HI MDM: CPT

## 2019-08-26 PROCEDURE — 87088 URINE BACTERIA CULTURE: CPT | Performed by: EMERGENCY MEDICINE

## 2019-08-26 PROCEDURE — 85025 COMPLETE CBC W/AUTO DIFF WBC: CPT | Performed by: EMERGENCY MEDICINE

## 2019-08-26 PROCEDURE — 36415 COLL VENOUS BLD VENIPUNCTURE: CPT

## 2019-08-26 RX ORDER — NITROFURANTOIN 25; 75 MG/1; MG/1
100 CAPSULE ORAL 2 TIMES DAILY
Qty: 14 CAPSULE | Refills: 0 | Status: SHIPPED | OUTPATIENT
Start: 2019-08-26 | End: 2019-09-02

## 2019-08-26 NOTE — ED INITIAL ASSESSMENT (HPI)
Pt reports seizure Friday, came here and discharge Saturday. Pt reports confusion on sunday. Last normal Saturday 5 pm.  Pt reports seeing people falling back in chairs. Pt reports hearing music and balance off. Pt reports achy since seizure on Friday.

## 2019-08-26 NOTE — ED PROVIDER NOTES
Patient Seen in: [facility] Emergency Department    History   Patient presents with:  Altered Mental Status (neurologic)    Stated Complaint: ams    HPI    Patient is a 52-year-old female with a history of alcohol abuse, alcoholic cardiomyopathy, wi 7. 5        Quit date: 11/3/1996        Years since quittin.8      Smokeless tobacco: Never Used    Alcohol use:  Yes      Alcohol/week: 2.0 standard drinks      Types: 2 Cans of beer per week      Frequency: 4 or more times a week      Binge frequency: (*)     Albumin 3.0 (*)     A/G Ratio 0.7 (*)     All other components within normal limits   PROTHROMBIN TIME (PT) - Abnormal; Notable for the following components:    PT 17.6 (*)     INR 1.37 (*)     All other components within normal limits   AMMONIA, P Rhythm  Reading: Sinus rhythm with rate 64. T inversions in the septal and lateral precordial leads. No change from prior dated August 23, 2019. Ct Brain Or Head ([address])    Result Date: 8/26/2019  CONCLUSION:  1. No acute hemorrhage.  2. Camille is well-appearing with a normal neuro exam here and appears quite comfortable. She is not tremulous or anxious, not tachycardic or hypertensive.   I do suspect that her symptoms are likely still due to some alcohol withdrawal if she is only about 4 days ou

## 2019-08-27 LAB
ATRIAL RATE: 64 BPM
P AXIS: 14 DEGREES
P-R INTERVAL: 152 MS
Q-T INTERVAL: 426 MS
QRS DURATION: 88 MS
QTC CALCULATION (BEZET): 439 MS
R AXIS: 51 DEGREES
T AXIS: 84 DEGREES
VENTRICULAR RATE: 64 BPM

## 2019-08-28 DIAGNOSIS — F10.230 SEIZURE DUE TO ALCOHOL WITHDRAWAL, UNCOMPLICATED (HCC): ICD-10-CM

## 2019-08-28 DIAGNOSIS — R56.9 SEIZURE DUE TO ALCOHOL WITHDRAWAL, UNCOMPLICATED (HCC): ICD-10-CM

## 2019-08-28 RX ORDER — LEVETIRACETAM 500 MG/1
TABLET ORAL
Qty: 60 TABLET | Refills: 1 | OUTPATIENT
Start: 2019-08-28

## 2019-08-28 NOTE — TELEPHONE ENCOUNTER
Spoke with pharmacist Raquel Oviedo who states dosage was changed on 8/24/19 but old Rx was not discontinued on system.  Ignore request per pharmacist.

## 2019-10-03 NOTE — TELEPHONE ENCOUNTER
Pt calling to request refill of Emily Neighbours. Dose was increased while pt was in hospital recently. Pt states she has enough medication for tonight only.

## 2019-10-14 ENCOUNTER — OFFICE VISIT (OUTPATIENT)
Dept: NEUROLOGY | Facility: CLINIC | Age: 54
End: 2019-10-14
Payer: COMMERCIAL

## 2019-10-14 VITALS
SYSTOLIC BLOOD PRESSURE: 124 MMHG | BODY MASS INDEX: 22 KG/M2 | RESPIRATION RATE: 16 BRPM | HEART RATE: 68 BPM | WEIGHT: 118 LBS | DIASTOLIC BLOOD PRESSURE: 66 MMHG

## 2019-10-14 DIAGNOSIS — R41.3 MEMORY LOSS: ICD-10-CM

## 2019-10-14 DIAGNOSIS — G40.909 SEIZURE DISORDER (HCC): Primary | ICD-10-CM

## 2019-10-14 DIAGNOSIS — F10.230 SEIZURE DUE TO ALCOHOL WITHDRAWAL, UNCOMPLICATED (HCC): ICD-10-CM

## 2019-10-14 DIAGNOSIS — R56.9 SEIZURE DUE TO ALCOHOL WITHDRAWAL, UNCOMPLICATED (HCC): ICD-10-CM

## 2019-10-14 DIAGNOSIS — I63.50 CEREBRAL ARTERY OCCLUSION WITH CEREBRAL INFARCTION (HCC): ICD-10-CM

## 2019-10-14 PROCEDURE — 99214 OFFICE O/P EST MOD 30 MIN: CPT | Performed by: OTHER

## 2019-10-14 RX ORDER — LEVETIRACETAM 750 MG/1
750 TABLET ORAL 2 TIMES DAILY
Qty: 180 TABLET | Refills: 1 | Status: ON HOLD | OUTPATIENT
Start: 2019-10-14 | End: 2019-12-21

## 2019-10-14 NOTE — PROGRESS NOTES
The patient states one seizure since her last office visit. Patient states her last seizure was on 08/23/19. Patient states no changes in memory, balance or speech.

## 2019-10-14 NOTE — PROGRESS NOTES
Neurology H&P    Susan Anderson Patient Status:  No patient class for patient encounter    1965 MRN [id number]   Location [address] [place] [address], [address] [place] [address] Drive, [address] [place] [address] Attending No att. providers found   Hosp Day # 0 PCP Judy Lawler DO 3        Problem List:  Patient Active Problem List:     History of MI (myocardial infarction)     Cardiomyopathy ([place] [address]     Abdominal pain     Alcoholism ([facility]     Chest pain     Seizure due to alcohol withdrawal, uncomplicated ([facility]     Cerebral artery oc Smokeless tobacco: Never Used    Alcohol use: Yes      Alcohol/week: 2.0 standard drinks      Types: 2 Cans of beer per week      Frequency: 4 or more times a week      Binge frequency: Daily or almost daily      Comment: 2 cans beer/day    Drug use:  No Normal muscle bulk    Sens:        UE: Intact to light touch and pinprick throughout       LE: Inact to light touch, pinprick intact    Reflexes:        UE: 2+ biceps, 2+ brachioradialis       LE: 2+ patellae    Gait: unsteady tandem and toe walk    Coordi and branches are normal.  An azygos A2 segment is seen which is a variant of normal.  There is a   reflux into the left a 1 segment, left supra clinoid ICA and left MCA which are angiographically unremarkable.   The right PCA is fetal.  What was demonstrate right temporal lobe and right occipital lobe that likely represent areas of petechial hemorrhage. This can be followed   with CT.      2. There is a punctate area of restricted diffusion in the left parietal lobe best seen on image 21 series 3 compatible w imbalance  - Vision loss and deconditioning     A total of 25 minutes was spent with patient >50% of visit was spent in counseling and coordination of care, including discussion of diagnostic workup to date, discussion of medication side effects and plan f

## 2019-10-29 ENCOUNTER — APPOINTMENT (OUTPATIENT)
Dept: CT IMAGING | Facility: HOSPITAL | Age: 54
DRG: 101 | End: 2019-10-29
Attending: EMERGENCY MEDICINE
Payer: COMMERCIAL

## 2019-10-29 ENCOUNTER — HOSPITAL ENCOUNTER (OUTPATIENT)
Facility: HOSPITAL | Age: 54
Setting detail: OBSERVATION
Discharge: HOME | DRG: 101 | End: 2019-10-31
Attending: EMERGENCY MEDICINE | Admitting: INTERNAL MEDICINE
Payer: COMMERCIAL

## 2019-10-29 ENCOUNTER — APPOINTMENT (OUTPATIENT)
Dept: GENERAL RADIOLOGY | Facility: HOSPITAL | Age: 54
DRG: 101 | End: 2019-10-29
Attending: EMERGENCY MEDICINE
Payer: COMMERCIAL

## 2019-10-29 DIAGNOSIS — R26.81 UNSTEADY GAIT: ICD-10-CM

## 2019-10-29 DIAGNOSIS — G40.909 SEIZURE DISORDER (HCC): Primary | ICD-10-CM

## 2019-10-29 DIAGNOSIS — F10.10 ALCOHOL ABUSE: ICD-10-CM

## 2019-10-29 PROCEDURE — 0042T CT STROKE(DAWN BRAIN)CTA BRAIN/CTA NECK+PERF(CPT=70496/70498/0042T): CPT | Performed by: EMERGENCY MEDICINE

## 2019-10-29 PROCEDURE — 70498 CT ANGIOGRAPHY NECK: CPT | Performed by: EMERGENCY MEDICINE

## 2019-10-29 PROCEDURE — 70496 CT ANGIOGRAPHY HEAD: CPT | Performed by: EMERGENCY MEDICINE

## 2019-10-29 PROCEDURE — 71045 X-RAY EXAM CHEST 1 VIEW: CPT | Performed by: EMERGENCY MEDICINE

## 2019-10-29 RX ORDER — ASPIRIN 300 MG
300 SUPPOSITORY, RECTAL RECTAL DAILY
Status: DISCONTINUED | OUTPATIENT
Start: 2019-10-29 | End: 2019-10-30

## 2019-10-29 RX ORDER — ACETAMINOPHEN 325 MG/1
650 TABLET ORAL EVERY 4 HOURS PRN
Status: DISCONTINUED | OUTPATIENT
Start: 2019-10-29 | End: 2019-10-31

## 2019-10-29 RX ORDER — ATORVASTATIN CALCIUM 80 MG/1
80 TABLET, FILM COATED ORAL NIGHTLY
Status: DISCONTINUED | OUTPATIENT
Start: 2019-10-29 | End: 2019-10-31

## 2019-10-29 RX ORDER — BISACODYL 10 MG
10 SUPPOSITORY, RECTAL RECTAL
Status: DISCONTINUED | OUTPATIENT
Start: 2019-10-29 | End: 2019-10-31

## 2019-10-29 RX ORDER — SENNOSIDES 8.6 MG
17.2 TABLET ORAL NIGHTLY
Status: DISCONTINUED | OUTPATIENT
Start: 2019-10-29 | End: 2019-10-31

## 2019-10-29 RX ORDER — SERTRALINE HYDROCHLORIDE 100 MG/1
100 TABLET, FILM COATED ORAL DAILY
Status: DISCONTINUED | OUTPATIENT
Start: 2019-10-30 | End: 2019-10-31

## 2019-10-29 RX ORDER — LORAZEPAM 2 MG/ML
1 INJECTION INTRAMUSCULAR ONCE
Status: COMPLETED | OUTPATIENT
Start: 2019-10-29 | End: 2019-10-29

## 2019-10-29 RX ORDER — METOCLOPRAMIDE HYDROCHLORIDE 5 MG/ML
10 INJECTION INTRAMUSCULAR; INTRAVENOUS EVERY 8 HOURS PRN
Status: DISCONTINUED | OUTPATIENT
Start: 2019-10-29 | End: 2019-10-31

## 2019-10-29 RX ORDER — LORAZEPAM 1 MG/1
1 TABLET ORAL
Status: DISCONTINUED | OUTPATIENT
Start: 2019-10-29 | End: 2019-10-31

## 2019-10-29 RX ORDER — LORAZEPAM 2 MG/ML
1 INJECTION INTRAMUSCULAR
Status: DISCONTINUED | OUTPATIENT
Start: 2019-10-29 | End: 2019-10-31

## 2019-10-29 RX ORDER — SODIUM CHLORIDE 9 MG/ML
INJECTION, SOLUTION INTRAVENOUS CONTINUOUS
Status: DISCONTINUED | OUTPATIENT
Start: 2019-10-29 | End: 2019-10-31

## 2019-10-29 RX ORDER — ACETAMINOPHEN 650 MG/1
650 SUPPOSITORY RECTAL EVERY 4 HOURS PRN
Status: DISCONTINUED | OUTPATIENT
Start: 2019-10-29 | End: 2019-10-31

## 2019-10-29 RX ORDER — FOLIC ACID 1 MG/1
1 TABLET ORAL DAILY
Status: DISCONTINUED | OUTPATIENT
Start: 2019-10-29 | End: 2019-10-31

## 2019-10-29 RX ORDER — MULTIPLE VITAMINS W/ MINERALS TAB 9MG-400MCG
1 TAB ORAL DAILY
Status: DISCONTINUED | OUTPATIENT
Start: 2019-10-29 | End: 2019-10-31

## 2019-10-29 RX ORDER — MELATONIN
100 DAILY
Status: DISCONTINUED | OUTPATIENT
Start: 2019-10-30 | End: 2019-10-31

## 2019-10-29 RX ORDER — LORAZEPAM 2 MG/ML
INJECTION INTRAMUSCULAR
Status: COMPLETED
Start: 2019-10-29 | End: 2019-10-29

## 2019-10-29 RX ORDER — FAMOTIDINE 10 MG/ML
20 INJECTION, SOLUTION INTRAVENOUS 2 TIMES DAILY
Status: DISCONTINUED | OUTPATIENT
Start: 2019-10-29 | End: 2019-10-31

## 2019-10-29 RX ORDER — LORAZEPAM 2 MG/ML
2 INJECTION INTRAMUSCULAR
Status: DISCONTINUED | OUTPATIENT
Start: 2019-10-29 | End: 2019-10-31

## 2019-10-29 RX ORDER — POLYETHYLENE GLYCOL 3350 17 G/17G
17 POWDER, FOR SOLUTION ORAL DAILY PRN
Status: DISCONTINUED | OUTPATIENT
Start: 2019-10-29 | End: 2019-10-31

## 2019-10-29 RX ORDER — MORPHINE SULFATE 4 MG/ML
2 INJECTION, SOLUTION INTRAMUSCULAR; INTRAVENOUS EVERY 2 HOUR PRN
Status: DISCONTINUED | OUTPATIENT
Start: 2019-10-29 | End: 2019-10-31

## 2019-10-29 RX ORDER — FOLIC ACID 1 MG/1
1 TABLET ORAL DAILY
COMMUNITY
End: 2020-03-02

## 2019-10-29 RX ORDER — FAMOTIDINE 20 MG/1
20 TABLET ORAL 2 TIMES DAILY
Status: DISCONTINUED | OUTPATIENT
Start: 2019-10-29 | End: 2019-10-31

## 2019-10-29 RX ORDER — ASPIRIN 325 MG
325 TABLET ORAL DAILY
Status: DISCONTINUED | OUTPATIENT
Start: 2019-10-29 | End: 2019-10-30

## 2019-10-29 RX ORDER — LABETALOL HYDROCHLORIDE 5 MG/ML
10 INJECTION, SOLUTION INTRAVENOUS EVERY 10 MIN PRN
Status: DISCONTINUED | OUTPATIENT
Start: 2019-10-29 | End: 2019-10-31

## 2019-10-29 RX ORDER — PHENYLEPHRINE HCL IN 0.9% NACL 50MG/250ML
PLASTIC BAG, INJECTION (ML) INTRAVENOUS CONTINUOUS PRN
Status: DISCONTINUED | OUTPATIENT
Start: 2019-10-29 | End: 2019-10-31

## 2019-10-29 RX ORDER — DOCUSATE SODIUM 100 MG/1
100 CAPSULE, LIQUID FILLED ORAL 2 TIMES DAILY
Status: DISCONTINUED | OUTPATIENT
Start: 2019-10-29 | End: 2019-10-31

## 2019-10-29 RX ORDER — SODIUM PHOSPHATE, DIBASIC AND SODIUM PHOSPHATE, MONOBASIC 7; 19 G/133ML; G/133ML
1 ENEMA RECTAL ONCE AS NEEDED
Status: DISCONTINUED | OUTPATIENT
Start: 2019-10-29 | End: 2019-10-31

## 2019-10-29 RX ORDER — LORAZEPAM 1 MG/1
2 TABLET ORAL
Status: DISCONTINUED | OUTPATIENT
Start: 2019-10-29 | End: 2019-10-31

## 2019-10-29 RX ORDER — ONDANSETRON 2 MG/ML
4 INJECTION INTRAMUSCULAR; INTRAVENOUS EVERY 6 HOURS PRN
Status: DISCONTINUED | OUTPATIENT
Start: 2019-10-29 | End: 2019-10-31

## 2019-10-29 RX ORDER — MORPHINE SULFATE 4 MG/ML
1 INJECTION, SOLUTION INTRAMUSCULAR; INTRAVENOUS EVERY 2 HOUR PRN
Status: DISCONTINUED | OUTPATIENT
Start: 2019-10-29 | End: 2019-10-31

## 2019-10-29 RX ORDER — MULTIVITAMIN WITH FOLIC ACID 400 MCG
1 TABLET ORAL DAILY
Status: ON HOLD | COMMUNITY
End: 2021-09-04

## 2019-10-29 NOTE — PROGRESS NOTES
Pt is alert and oriented x4  Seizure precautions, stroke protocol, CIWA protocol. Passed RN dysphagia screening- advanced to cardiac diet  Neuro okay with bathroom privileges. VSS room air  Will continue to monitor.

## 2019-10-29 NOTE — ED PROVIDER NOTES
Patient Seen in: [facility] Emergency Department      History   Patient presents with:  Stroke (neurologic)  Headache (neurologic)  Seizure Disorder (neurologic)    Stated Complaint:     HPI    52-year-old female complaining of headache.   The patien Use      Smoking status: Former Smoker        Packs/day: 0.75        Years: 10.00        Pack years: 7.5        Quit date: 11/3/1996        Years since quittin.0      Smokeless tobacco: Never Used    Alcohol use:  Yes      Alcohol/week: 2.0 standard dr she could lift the leg off the cart. The sensation is intact in all 4 extremities cerebellar finger-nose on the right side was normal she cannot do this as she cannot raise left arm up on the left side. There is no expressive aphasia.     ED Course     La GLUCOSE - Abnormal; Notable for the following components:    POC Glucose 106 (*)     All other components within normal limits   CBC W/ DIFFERENTIAL - Abnormal; Notable for the following components:    MCH 35.5 (*)     All other components within normal li 21:40          Xr Chest Ap Portable  (cpt=71045)    Result Date: 10/29/2019  CONCLUSION:  No acute abnormality is seen. If clinical symptoms persist consider followup radiographs or CT.      Dictated by: Misty Sanchez MD on 10/29/2019 at 12:37     Approved by Normal, Disp-30 tablet, R-1                     Present on Admission  Date Reviewed: 10/14/2019          ICD-10-CM Noted POA    * (Principal) Seizure disorder ([facility] G40.909 8/23/2019 Unknown    Alcohol abuse F10.10 10/29/2019

## 2019-10-29 NOTE — PROGRESS NOTES
CODE STROKE CESAR NOTE:      Responded at 11:35 to Francisco Castro paged at 11:30 in ED C1      49year old female presents via EMS for c/o of HA, left arm weakness, unsteady gait.   Patient has history of ETOH abuse, drinks daily; reports last drink was l

## 2019-10-29 NOTE — H&P
[facility] Hospitalist H&P       CC: Patient presents with:  Stroke (neurologic)  Headache (neurologic)  Seizure Disorder (neurologic)       PCP: Avtar Bateman DO    History of Present Illness:  Ms. Kehinde Menjivar is a 48 yo female with PMH of ETOH abuse with ques Take 1 tablet (750 mg total) by mouth 2 (two) times daily. , Disp: 180 tablet, Rfl: 1  Sertraline HCl 100 MG Oral Tab, Take 100 mg by mouth daily. , Disp: , Rfl:   apixaban (ELIQUIS) 5 MG Oral Tab, Take 1 tablet (5 mg total) by mouth 2 (two) times daily. Flor Raines distress, alert and oriented   HEENT: sclera anicteric, oral mucosa moist  Pulm: Lungs clear bilaterally, good inspiratory effort   CV:  nL S1/S2, RRR  Abd: soft, NT/ND, no hepatomegaly, +BS  MSK: moving all extremities, no edema  Neuro: Left sided visual BRAIN OR HEAD (21557), 8/26/2019, 17:29. INDICATIONS:  Left-sided weakness. TECHNIQUE:  Unenhanced followed by contrast enhanced multislice CT angiography of the brain and neck vasculature using non-ionic contrast was performed.   3D volume renderings are are unremarkable in appearance. No cervical lymphadenopathy is seen. CTA head:  Bilateral high cervical, petrous, cavernous and supraclinoid ICA segments are unremarkable in appearance.   The distal branches of the bilateral anterior and middle cerebral a noncompliant with coumadin previously)  - ASA  - lipids, HgA1c  - PT/OT/SLP    # Hx ETOH Abuse  - Has had some withdrawal symptoms previously, has been cutting down per patient  - CIWA protocol  - MV/thiamine/folate  - Psych liaison    # Hx ETOH Induced CM

## 2019-10-29 NOTE — ED INITIAL ASSESSMENT (HPI)
Pt here for r/o stroke, pt states headache, loss of balance. Pt also drinks daily, last drink last night, pt admits to drinking to every day. Pt states some nausea and diarrhea. Pt also states shakiness.  Pt has hx of seizures, last one in August. Pt states

## 2019-10-30 ENCOUNTER — APPOINTMENT (OUTPATIENT)
Dept: MRI IMAGING | Facility: HOSPITAL | Age: 54
DRG: 101 | End: 2019-10-30
Attending: Other
Payer: COMMERCIAL

## 2019-10-30 ENCOUNTER — APPOINTMENT (OUTPATIENT)
Dept: CV DIAGNOSTICS | Facility: HOSPITAL | Age: 54
DRG: 101 | End: 2019-10-30
Attending: INTERNAL MEDICINE
Payer: COMMERCIAL

## 2019-10-30 PROCEDURE — 95816 EEG AWAKE AND DROWSY: CPT | Performed by: OTHER

## 2019-10-30 PROCEDURE — 99214 OFFICE O/P EST MOD 30 MIN: CPT | Performed by: OTHER

## 2019-10-30 PROCEDURE — 93306 TTE W/DOPPLER COMPLETE: CPT | Performed by: INTERNAL MEDICINE

## 2019-10-30 PROCEDURE — 70551 MRI BRAIN STEM W/O DYE: CPT | Performed by: OTHER

## 2019-10-30 RX ORDER — POTASSIUM CHLORIDE 20 MEQ/1
40 TABLET, EXTENDED RELEASE ORAL EVERY 4 HOURS
Status: COMPLETED | OUTPATIENT
Start: 2019-10-30 | End: 2019-10-30

## 2019-10-30 NOTE — BH PROGRESS NOTE
Went to see the pt for her etoh history. She states she is drinking 3 beers daily. Sara Thomas reports this is much less than the vodka that she use to drink from 10am to 10pm daily. The pt reports her 20 y/o daughter and  drinks too.   She said, her

## 2019-10-30 NOTE — PHYSICAL THERAPY NOTE
PHYSICAL THERAPY QUICK EVALUATION - INPATIENT    Room Number: 6658/4415-H  Evaluation Date: 10/30/2019  Presenting Problem: L weakness, seizure  Physician Order: PT Eval and Treat  History:  Admitted with headache, loss of balance and L arm weakness + se OT evaluation    Lower extremity ROM is within functional limits     Lower extremity strength is within functional limits     NEUROLOGICAL FINDINGS  Neurological Findings: Coordination - Heel to Shin;Coordination - Rapid Alternating Movement;Sensation; Tone supervision (limited by IV). Discussed with patient recommendation for OP PT for balance activities, patient receptive. Patient End of Session: Up in chair;Needs met;Call light within reach;RN aware of session/findings; All patient questions and denny

## 2019-10-30 NOTE — OCCUPATIONAL THERAPY NOTE
OCCUPATIONAL THERAPY QUICK EVALUATION - INPATIENT    Room Number: 8729/9440-J  Evaluation Date: 10/30/2019     Type of Evaluation: Quick Eval  Presenting Problem: Seizure, L weakness    Physician Order: IP Consult to Occupational Therapy  Reason for Anum Prose admission, patient was independent with ADL and functional mobility. She lives with her  who travels for work, daughter and teenage son. She has L visual field cut and does not drive but gets groceries delivered. She cooks and cleans.     SUBJECTIVE sitting up in chair and agreeable to participate in therapy. Patient performs all dressing with modified independence. Able to perform a toilet transfer from a standard height with modified independence.  She ambulates for functional transfers independently

## 2019-10-30 NOTE — SLP NOTE
ADULT SWALLOWING EVALUATION    ASSESSMENT    ASSESSMENT/OVERALL IMPRESSION:  Pt seen at bedside this AM for bedside swallow evaluation. Pt admitted to hospital due to c/o headache and decreased balance. Current workup for CVA in progress.  Pt's history sig History of blood clots     legs; heart; head   • History of ETOH abuse    • Pneumonia, organism unspecified(486)     pt denies having this 10/29/19   • Psychiatric disorder    • Seizure disorder [facility])    • Stroke ([facility] 04/2015   • Unspecified essential hyp esophageal involvement      FOLLOW UP  Treatment Plan/Recommendations: (cog/comm evaluation pending neuro findings)     Follow Up Needed: Yes  SLP Follow-up Date: 10/31/19    Thank you for your referral.   If you have any questions, please contact Fer Harris

## 2019-10-30 NOTE — CONSULTS
[id number] [facility] Neurology Consultation    Date of consult: 10/30/2019    Reason for consult: seizure    HPI: Ms. Renee Noland is a 50 yo female with PMH of ETOH abuse, CAD, seizure disorder, hx right occipital parietal stroke, HTN, DVT (Eliquis) who pr 650 mg, 650 mg, Oral, Q4H PRN    Or  acetaminophen (TYLENOL) 650 MG rectal suppository 650 mg, 650 mg, Rectal, Q4H PRN  morphINE sulfate (PF) 4 MG/ML injection 1 mg, 1 mg, Intravenous, Q2H PRN    Or  morphINE sulfate (PF) 4 MG/ML injection 2 mg, 2 mg, Intr 10/29/19   • Psychiatric disorder    • Seizure disorder [facility])    • Stroke ([facility] 04/2015   • Unspecified essential hypertension      Past Surgical History:   Procedure Laterality Date   • ANGIOGRAM  11/13/13    minimal CAD in the RCA   • ANGIOPLASTY (CORONA light touch and pinprick throughout       LE: Inact to light touch, pinprick intact   Reflexes:        UE: 2+ biceps, 2+ brachioradialis       LE: 2+ patellae   Gait: unsteady tandem and toe walk   Coordination:       FTN: mild tremor    Data and Notes Rev

## 2019-10-30 NOTE — PLAN OF CARE
Pt. A&Ox4   IVF  Tele- NSR  Eliquis   Seizure precautions  CIWA Q2  Neuro checks Q4  Daily NIH  QID accucheck  Echo, EEG, MRI brain today  PT/OT to geo Rosa K per protocol  Staff will continue to monitor    Problem: CARDIOVASCULAR - ADULT  Goal: Main Monitor response to electrolyte replacements, including rhythm and repeat lab results as appropriate  - Fluid restriction as ordered  - Instruct patient on fluid and nutrition restrictions as appropriate  Outcome: Progressing     Problem: NEUROLOGICAL - AD rate  - Encourage small bites of food and small sips of liquid  - Offer pills one at a time, crush or deliver with applesauce as needed  - Discontinue feeding and notify MD (or speech pathologist) if coughing or persistent throat clearing or wet/gurgly voc

## 2019-10-30 NOTE — PLAN OF CARE
Assumed patient care at 1900  Patient A&O x 4  Down at times  No complaints of pain or discomfort  Stroke protocol  Neuros Q2 until 0200 then Q4 hours  NIH daily  CIWA protocol  Seizure precautions in place  VSS  Tele-SR  SLP consult  Psyc consult  Plan fo patient on self management of diabetes  Outcome: Progressing  Goal: Electrolytes maintained within normal limits  Description  INTERVENTIONS:  - Monitor labs and rhythm and assess patient for signs and symptoms of electrolyte imbalances  - Administer elect strengthening/mobility  - Encourage toileting schedule  Outcome: Progressing

## 2019-10-30 NOTE — PROGRESS NOTES
10/30/19 7933   Clinical Encounter Type   Visited With Patient   Continue Visiting No   Patient Spiritual Encounters   Spiritual Interventions  initiated relationship, providing support as patient sadly shared her health history and episode yest

## 2019-10-30 NOTE — CM/SW NOTE
MSW met with patient at bedside, discussed any home needs. She states no. States no when asked if she needs counseling, food pantry or other community resources.

## 2019-10-30 NOTE — PROCEDURES
Date of Procedure: 10/30/2019    Procedure: EEG (ELECTROENCEPHALOGRAM)     DX:  ALCOHOL ABUSE  HX:  48 Y/O FEMALE BEING EVALUATED FOR PMH SIGNIFICANT FOR CARDIOMYOPATHY, ETOH ABOUSE, MYOCARDIAL INFARCTION.  PER PT  IN AUGUAST PT HAD HEADACHE AND WAS

## 2019-10-30 NOTE — PROGRESS NOTES
[facility] Hospitalist Progress Note     PCP: Reuben Boast, DO    CC:  Follow up    SUBJECTIVE:  Sitting up in bed, awaiting MRI brain. No seizure activity overnight. No cp/sob/n/v/f/c. No dysuria, diarrhea.     OBJECTIVE:  Temp:  [97.5 °F (36.4 °C)-99.7 °F (37 Oral Nightly   • Senna  17.2 mg Oral Nightly   • docusate sodium  100 mg Oral BID   • famoTIDine  20 mg Oral BID    Or   • famoTIDine  20 mg Intravenous BID     • sodium chloride 75 mL/hr at 10/30/19 1115   • NiCARdipine     • phenylephrine       iohexol,

## 2019-10-31 VITALS
TEMPERATURE: 98 F | SYSTOLIC BLOOD PRESSURE: 126 MMHG | DIASTOLIC BLOOD PRESSURE: 92 MMHG | HEART RATE: 80 BPM | OXYGEN SATURATION: 98 % | HEIGHT: 62.01 IN | WEIGHT: 116 LBS | BODY MASS INDEX: 21.08 KG/M2 | RESPIRATION RATE: 16 BRPM

## 2019-10-31 PROCEDURE — 99214 OFFICE O/P EST MOD 30 MIN: CPT | Performed by: OTHER

## 2019-10-31 RX ORDER — ATORVASTATIN CALCIUM 40 MG/1
40 TABLET, FILM COATED ORAL NIGHTLY
Qty: 30 TABLET | Refills: 1 | Status: SHIPPED | OUTPATIENT
Start: 2019-10-31 | End: 2019-12-26

## 2019-10-31 NOTE — PROGRESS NOTES
[id number] [facility] Neurology Progress Note    Caspercarolann Sol Patient Status:  Inpatient    1965 MRN [id number]   [facility] 3NE-A Attending Mryiam Nieves, *   Hosp Day # 2 PCP Venkat Tellez DO     CC: Possible Seizure significant stenosis at either carotid bifurcation. No evidence of ischemic penumbra on perfusion imaging.      Assessment/Plan:  · Seizures  · EEG negative for seizure  · MRI negative for acute infarct  · Keppra 750mg BID  · Seizure precautions  · Hx of C

## 2019-10-31 NOTE — DISCHARGE SUMMARY
General Medicine Discharge Summary     Patient ID:  Jakub Schneider  49year old  [id number]  5/5/1965    Admit date: 10/29/2019    Discharge date and time:  10/31/19    Attending Physician: Moises Sarkar, *     Primary Care Physician: Belia Schneider ok  - PT/OT/SLP     # Hx ETOH Abuse- has not required ativan overnight  - Has had some withdrawal symptoms previously, has been cutting down per patient  - [facility] protocol  - MV/thiamine/folate  - Psych liaison     # Hx ETOH Induced CM  - echo unchanged from mastoid air cells are unremarkable. The expected major intracranial flow voids are present. CONCLUSION:   1. No acute intracranial abnormality identified.   2. Stable encephalomalacia and changes of laminar necrosis are noted in the right parietal lo and interpreted  to optimize visualization of vascular anatomy. PERFUSION:  Axial sections were obtained per stroke protocol. Arterial and venous structures were selected for purposes of brain perfusion mapping.  All measurements obtained in this exam wer posterior cerebral artery. The right P1 segment appears diminutive. Distal branches of the bilateral posterior cerebral arteries appear unremarkable. No aneurysm is seen.   CT perfusion:  Changes within the right occipital region compatible with known pr tablet (25 mg total) by mouth nightly. Home Medication Changes:      Activity: activity as tolerated  Diet: as directed  Wound Care: none needed  Code Status: Full Code  O2: prn    Follow-up with      Orders Placed This Encounter      Consult to Yazan

## 2019-10-31 NOTE — PLAN OF CARE
NURSING DISCHARGE NOTE    Discharged home. Accompanied by nursing staff. Belongings sent home with pt. Discharge instructions, f/u appointments discussed with pt. Pt informed Rx called in to pharmacy, needs to get picked up.  Pt informed when to ta

## 2019-10-31 NOTE — PLAN OF CARE
Assumed care at [address]. Admitted for headache, seizure and stroke work up. Has a hx of a stroke in 2018 with mild left sided weakness and left side field cut. Daily NIH. Neuro checks Q4.   CT of head (-), MRI of brain (-). A&O x4. On RA, .    NSR

## 2019-10-31 NOTE — SLP NOTE
Attempted f/u with patient as per initial SLP POC however noted MRI brain negative for acute CVA. No acute cog comm changes/concern reported. SLP to sign off at this time. Please re-consult as indicated.    Twan Ricardo MS CCC-SLP/L, pager [phone]

## 2019-10-31 NOTE — PROGRESS NOTES
[facility] Hospitalist Progress Note     PCP: Leland Mckenna DO    CC:  Follow up    SUBJECTIVE:  Sitting up in bed, feels good.  No seizure activity overnight    OBJECTIVE:  Temp:  [98.1 °F (36.7 °C)-98.3 °F (36.8 °C)] 98.3 °F (36.8 °C)  Pulse:  [65-90] 65  Res atorvastatin  80 mg Oral Nightly   • Senna  17.2 mg Oral Nightly   • docusate sodium  100 mg Oral BID   • famoTIDine  20 mg Oral BID    Or   • famoTIDine  20 mg Intravenous BID     • sodium chloride 75 mL/hr at 10/31/19 0231   • NiCARdipine     • phenyleph

## 2019-10-31 NOTE — PAYOR COMM NOTE
--------------  ADMISSION REVIEW     Payor: Arianne Laughlin  #:  [id number]  Authorization Number: [id number]      Admit date: 10/29/19  Admit time: 6892       Admitting Physician: Lyndsay Gann MD  Attending Physician:  Douglas Ramírez 2015   • Unspecified essential hypertension         PSH  Past Surgical History:   Procedure Laterality Date   • ANGIOGRAM  13    minimal CAD in the RCA   • ANGIOPLASTY (CORONARY)  12    LAD-thrombotic event   •      • IR ANGIOGRAM CE alzhemiers and Parkinsons       Review of Systems  12 point ROS negative, except as stated in HPI    OBJECTIVE:  /77   Pulse 84   Temp 97.5 °F (36.4 °C)   Resp 20   Ht 157.5 cm (5' 2\")   Wt 116 lb (52.6 kg)   SpO2 97%   BMI 21.22 kg/m²      BP Readi HISTORY: (As transcribed by Technologist)  Patient offered no additional information at this time. FINDINGS:  Low lung volumes are present. Heart size is within normal limits. Pleural spaces appear clear.   Mediastinal and hilar contours are normal.  N These most likely represent chronic infarcts. A background of mild chronic microvascular ischemic changes within the white matter is similar to prior exams.   CTA neck:  Separate origins of the right brachiocephalic, left common carotid and left subclavian 10/29/19. Read back was performed.     Dictated by: Julius Lopez MD on 10/29/2019 at 12:26     Approved by: Julius Lopez MD on 10/29/2019 at 13:03                 ASSESSMENT / PLAN:     Ms. Rubi Stein is a 50 yo female with PMH of ETOH abuse with question of E (K-DUR M20) CR tab 40 mEq     Date Action Dose Route User    10/30/2019 1244 Given 40 mEq Oral Tracey Paul, KJ      Sertraline HCl (ZOLOFT) tab 100 mg     Date Action Dose Route User    10/30/2019 1113 Given 100 mg Oral Tracey Paul, RN      0.9% 5/5, : 5/5       RLE: HF: 5/5, HE: 5/5, KF: 5/5, KE: 5/5, DF: 5/5, PF: 5/5       LLE: HF: 5/5, HE: 5/5, KF: 5/5, KE: 5/5, DF: 5/5, PF: 5/5       Normal tone       Normal muscle bulk   Sens:        UE: Intact to light touch and pinprick throughout       MYOCARDIAL INFARCTION. PER PT  IN AUGUAST PT HAD HEADACHE AND WAS NOT FEELING WELL. PT WENT TO TAKE A NAP AND WHEN  WENT TO CHECK ON PT SHE HAD GAZE DEVIATION AND WAS SHAKING. PT DOSE NOT REMEMBER ANY OF THIS  CALLED 911.   PMH:  ALCOHOL use  Pulm: Lungs clear, normal respiratory effort  CV: Heart with regular rate and rhythm, no peripheral edema  Abd: Abdomen soft, nontender, nondistended, bowel sounds present, no overt hernias  MSK:   no clubbing, no cyanosis  Skin: no visible rashes   P of ETOH abuse with question of ETOH induced CM, seizure disorder, hx right MCA stroke, HTN, DVT (Eliquis) who presented with loss of balance and left sided weakness.     # Seizure  - Patient with focal seizure in ED  - s/p  IV keppra  - Neuro c/s  - CT hea

## 2019-11-01 NOTE — PAYOR COMM NOTE
REQUESTING 2 INPT DAYS    DISCHARGE REVIEW    Payor: Arianne Zapata #:  [id number]  Authorization Number: [id number]      Admit date: 10/29/19  Admit time:  7253  Discharge Date: 10/31/2019  3:38 PM     Admitting Physician: Sharee Baker associated headache. She does have history of daily alcohol use (typically 2-3 drinks daily). Patient noted to have focal seizure in the ED, and given keppra.  CT head/neck done -- showed stable areas of encephalomalacia, no intracranial arterial occlusion parenchymal volume loss without overt hydrocephalus. There is no midline shift or mass effect. The basal cisterns are patent. The gray-white matter differentiation is intact. The craniocervical junction is unremarkable.   Stable encephalomalacia and jose CONCLUSION:  No acute abnormality is seen. If clinical symptoms persist consider followup radiographs or CT.      Dictated by: Bee Daniel MD on 10/29/2019 at 12:37     Approved by: Bee Daniel MD on 10/29/2019 at 12:38          Ct Stroke(anastacia Brain)cta There is a 3 vessel aortic arch. The origins of the major vessels arising from the thoracic aorta appear patent. There is no evidence of hemodynamically significant stenosis at either carotid bifurcation based on NASCET criteria.   The left vertebral ernesto these medications    atorvastatin 40 MG Oral Tab  Take 1 tablet (40 mg total) by mouth nightly. CONTINUE these medications which have NOT CHANGED    folic acid 1 MG Oral Tab  Take 1 mg by mouth daily.     Multiple Vitamin (TAB-A-SUDARSHAN) Oral Tab  Take 1

## 2019-12-20 ENCOUNTER — HOSPITAL ENCOUNTER (INPATIENT)
Facility: HOSPITAL | Age: 54
LOS: 2 days | Discharge: HOME | DRG: 101 | End: 2019-12-22
Attending: EMERGENCY MEDICINE | Admitting: HOSPITALIST
Payer: COMMERCIAL

## 2019-12-20 DIAGNOSIS — E83.42 HYPOMAGNESEMIA: ICD-10-CM

## 2019-12-20 DIAGNOSIS — G40.909 SEIZURE DISORDER (HCC): Primary | ICD-10-CM

## 2019-12-20 DIAGNOSIS — F10.239 ALCOHOL WITHDRAWAL SYNDROME WITH COMPLICATION (HCC): ICD-10-CM

## 2019-12-20 PROBLEM — F10.939 ALCOHOL WITHDRAWAL SYNDROME WITH COMPLICATION (HCC): Status: ACTIVE | Noted: 2019-12-20

## 2019-12-20 LAB
ALBUMIN SERPL-MCNC: 3.9 G/DL (ref 3.4–5)
ALBUMIN/GLOB SERPL: 0.9 {RATIO} (ref 1–2)
ALP LIVER SERPL-CCNC: 108 U/L (ref 41–108)
ALT SERPL-CCNC: 118 U/L (ref 13–56)
ANION GAP SERPL CALC-SCNC: 11 MMOL/L (ref 0–18)
APTT PPP: 35 SECONDS (ref 25.4–36.1)
AST SERPL-CCNC: 417 U/L (ref 15–37)
BASOPHILS # BLD AUTO: 0.04 X10(3) UL (ref 0–0.2)
BASOPHILS NFR BLD AUTO: 0.9 %
BILIRUB SERPL-MCNC: 1.2 MG/DL (ref 0.1–2)
BUN BLD-MCNC: 11 MG/DL (ref 7–18)
BUN/CREAT SERPL: 10.9 (ref 10–20)
CALCIUM BLD-MCNC: 9.1 MG/DL (ref 8.5–10.1)
CHLORIDE SERPL-SCNC: 103 MMOL/L (ref 98–112)
CO2 SERPL-SCNC: 21 MMOL/L (ref 21–32)
CREAT BLD-MCNC: 1.01 MG/DL (ref 0.55–1.02)
DEPRECATED RDW RBC AUTO: 44.2 FL (ref 35.1–46.3)
EOSINOPHIL # BLD AUTO: 0.02 X10(3) UL (ref 0–0.7)
EOSINOPHIL NFR BLD AUTO: 0.5 %
ERYTHROCYTE [DISTWIDTH] IN BLOOD BY AUTOMATED COUNT: 12.2 % (ref 11–15)
ETHANOL SERPL-MCNC: <3 MG/DL (ref ?–3)
GLOBULIN PLAS-MCNC: 4.4 G/DL (ref 2.8–4.4)
GLUCOSE BLD-MCNC: 108 MG/DL (ref 70–99)
HAV IGM SER QL: 1.5 MG/DL (ref 1.6–2.6)
HCT VFR BLD AUTO: 37 % (ref 35–48)
HGB BLD-MCNC: 12.7 G/DL (ref 12–16)
IMM GRANULOCYTES # BLD AUTO: 0.02 X10(3) UL (ref 0–1)
IMM GRANULOCYTES NFR BLD: 0.5 %
INR BLD: 1.41 (ref 0.9–1.1)
LYMPHOCYTES # BLD AUTO: 1.24 X10(3) UL (ref 1–4)
LYMPHOCYTES NFR BLD AUTO: 28.6 %
M PROTEIN MFR SERPL ELPH: 8.3 G/DL (ref 6.4–8.2)
MCH RBC QN AUTO: 34 PG (ref 26–34)
MCHC RBC AUTO-ENTMCNC: 34.3 G/DL (ref 31–37)
MCV RBC AUTO: 98.9 FL (ref 80–100)
MONOCYTES # BLD AUTO: 0.36 X10(3) UL (ref 0.1–1)
MONOCYTES NFR BLD AUTO: 8.3 %
NEUTROPHILS # BLD AUTO: 2.65 X10 (3) UL (ref 1.5–7.7)
NEUTROPHILS # BLD AUTO: 2.65 X10(3) UL (ref 1.5–7.7)
NEUTROPHILS NFR BLD AUTO: 61.2 %
OSMOLALITY SERPL CALC.SUM OF ELEC: 280 MOSM/KG (ref 275–295)
PLATELET # BLD AUTO: 87 10(3)UL (ref 150–450)
POTASSIUM SERPL-SCNC: 4.2 MMOL/L (ref 3.5–5.1)
PSA SERPL DL<=0.01 NG/ML-MCNC: 18 SECONDS (ref 12.5–14.7)
RBC # BLD AUTO: 3.74 X10(6)UL (ref 3.8–5.3)
SODIUM SERPL-SCNC: 135 MMOL/L (ref 136–145)
WBC # BLD AUTO: 4.3 X10(3) UL (ref 4–11)

## 2019-12-20 RX ORDER — SODIUM CHLORIDE 9 MG/ML
INJECTION, SOLUTION INTRAVENOUS CONTINUOUS
Status: CANCELLED | OUTPATIENT
Start: 2019-12-20 | End: 2019-12-20

## 2019-12-20 RX ORDER — LORAZEPAM 2 MG/ML
3 INJECTION INTRAMUSCULAR
Status: CANCELLED | OUTPATIENT
Start: 2019-12-20

## 2019-12-20 RX ORDER — LORAZEPAM 2 MG/ML
1 INJECTION INTRAMUSCULAR EVERY 30 MIN PRN
Status: CANCELLED | OUTPATIENT
Start: 2019-12-20

## 2019-12-20 RX ORDER — FAMOTIDINE 20 MG/1
20 TABLET ORAL 2 TIMES DAILY
Status: DISCONTINUED | OUTPATIENT
Start: 2019-12-20 | End: 2019-12-22

## 2019-12-20 RX ORDER — LORAZEPAM 2 MG/ML
1 INJECTION INTRAMUSCULAR
Status: DISCONTINUED | OUTPATIENT
Start: 2019-12-20 | End: 2019-12-22

## 2019-12-20 RX ORDER — LORAZEPAM 2 MG/ML
1 INJECTION INTRAMUSCULAR ONCE
Status: COMPLETED | OUTPATIENT
Start: 2019-12-20 | End: 2019-12-20

## 2019-12-20 RX ORDER — ACETAMINOPHEN 325 MG/1
650 TABLET ORAL EVERY 6 HOURS PRN
Status: DISCONTINUED | OUTPATIENT
Start: 2019-12-20 | End: 2019-12-22

## 2019-12-20 RX ORDER — ONDANSETRON 2 MG/ML
4 INJECTION INTRAMUSCULAR; INTRAVENOUS EVERY 6 HOURS PRN
Status: DISCONTINUED | OUTPATIENT
Start: 2019-12-20 | End: 2019-12-22

## 2019-12-20 RX ORDER — ONDANSETRON 2 MG/ML
4 INJECTION INTRAMUSCULAR; INTRAVENOUS ONCE
Status: DISCONTINUED | OUTPATIENT
Start: 2019-12-20 | End: 2019-12-22

## 2019-12-20 RX ORDER — METOPROLOL SUCCINATE 25 MG/1
25 TABLET, EXTENDED RELEASE ORAL NIGHTLY
Status: DISCONTINUED | OUTPATIENT
Start: 2019-12-20 | End: 2019-12-22

## 2019-12-20 RX ORDER — LISINOPRIL 2.5 MG/1
5 TABLET ORAL NIGHTLY
Status: DISCONTINUED | OUTPATIENT
Start: 2019-12-20 | End: 2019-12-22

## 2019-12-20 RX ORDER — MAGNESIUM SULFATE HEPTAHYDRATE 40 MG/ML
2 INJECTION, SOLUTION INTRAVENOUS ONCE
Status: COMPLETED | OUTPATIENT
Start: 2019-12-20 | End: 2019-12-20

## 2019-12-20 RX ORDER — LORAZEPAM 2 MG/ML
2 INJECTION INTRAMUSCULAR
Status: CANCELLED | OUTPATIENT
Start: 2019-12-20

## 2019-12-20 RX ORDER — SODIUM CHLORIDE 9 MG/ML
INJECTION, SOLUTION INTRAVENOUS CONTINUOUS
Status: ACTIVE | OUTPATIENT
Start: 2019-12-20 | End: 2019-12-21

## 2019-12-20 RX ORDER — METOCLOPRAMIDE HYDROCHLORIDE 5 MG/ML
10 INJECTION INTRAMUSCULAR; INTRAVENOUS EVERY 8 HOURS PRN
Status: DISCONTINUED | OUTPATIENT
Start: 2019-12-20 | End: 2019-12-22

## 2019-12-20 RX ORDER — LORAZEPAM 2 MG/ML
2 INJECTION INTRAMUSCULAR
Status: DISCONTINUED | OUTPATIENT
Start: 2019-12-20 | End: 2019-12-22

## 2019-12-20 RX ORDER — ATORVASTATIN CALCIUM 40 MG/1
40 TABLET, FILM COATED ORAL NIGHTLY
Status: DISCONTINUED | OUTPATIENT
Start: 2019-12-20 | End: 2019-12-22

## 2019-12-20 RX ORDER — LORAZEPAM 2 MG/ML
INJECTION INTRAMUSCULAR
Status: COMPLETED
Start: 2019-12-20 | End: 2019-12-20

## 2019-12-20 RX ORDER — FOLIC ACID 1 MG/1
1 TABLET ORAL DAILY
Status: DISCONTINUED | OUTPATIENT
Start: 2019-12-21 | End: 2019-12-22

## 2019-12-20 RX ORDER — LORAZEPAM 2 MG/ML
4 INJECTION INTRAMUSCULAR EVERY 10 MIN PRN
Status: CANCELLED | OUTPATIENT
Start: 2019-12-20

## 2019-12-20 RX ORDER — LORAZEPAM 1 MG/1
2 TABLET ORAL
Status: DISCONTINUED | OUTPATIENT
Start: 2019-12-20 | End: 2019-12-22

## 2019-12-20 RX ORDER — DOXEPIN HYDROCHLORIDE 50 MG/1
1 CAPSULE ORAL DAILY
Status: DISCONTINUED | OUTPATIENT
Start: 2019-12-21 | End: 2019-12-22

## 2019-12-20 RX ORDER — LORAZEPAM 1 MG/1
1 TABLET ORAL
Status: DISCONTINUED | OUTPATIENT
Start: 2019-12-20 | End: 2019-12-22

## 2019-12-20 RX ORDER — FAMOTIDINE 10 MG
10 TABLET ORAL DAILY PRN
COMMUNITY
End: 2020-09-11

## 2019-12-20 RX ORDER — ONDANSETRON 2 MG/ML
4 INJECTION INTRAMUSCULAR; INTRAVENOUS ONCE
Status: COMPLETED | OUTPATIENT
Start: 2019-12-20 | End: 2019-12-20

## 2019-12-20 NOTE — ED INITIAL ASSESSMENT (HPI)
Patient reports she had a stroke 1.5 years ago, today had a seizure. Reports that this is her 4th seizure since the stroke. Per medics stroke lasted about 1 minute. Patient is A/O x4. Received 2mg of versed by medics.

## 2019-12-20 NOTE — ED PROVIDER NOTES
Patient Seen in: [facility] Emergency Department      History   Patient presents with:  Seizure Disorder    Stated Complaint: seizure    HPI    Patient is a 64-year-old female with a history of prior stroke with residual left-sided weakness, hyperte Social History    Tobacco Use      Smoking status: Former Smoker        Packs/day: 0.75        Years: 10.00        Pack years: 7.5        Quit date: 11/3/1996        Years since quittin.1      Smokeless tobacco: Never Used    [employer] Speech is fluent and clear.             ED Course     Labs Reviewed   COMP METABOLIC PANEL (14) - Abnormal; Notable for the following components:       Result Value    Glucose 108 (*)     Sodium 135 (*)      (*)      (*)     Total Protein 8.3 ordered. I was discussed the case with Dr. Hiren Gonzalez her neurologist who knows her well. He suspects there may be some degree of alcohol withdrawal with these seizures, think it be reasonable to admit the patient overnight for close observation.   While I 11/15/2019          ICD-10-CM Noted POA    [facility] G40.909 8/23/2019 Unknown

## 2019-12-21 LAB
ALBUMIN SERPL-MCNC: 3.1 G/DL (ref 3.4–5)
ALBUMIN/GLOB SERPL: 0.7 {RATIO} (ref 1–2)
ALP LIVER SERPL-CCNC: 90 U/L (ref 41–108)
ALT SERPL-CCNC: 96 U/L (ref 13–56)
ANION GAP SERPL CALC-SCNC: 8 MMOL/L (ref 0–18)
AST SERPL-CCNC: 304 U/L (ref 15–37)
BASOPHILS # BLD AUTO: 0.03 X10(3) UL (ref 0–0.2)
BASOPHILS NFR BLD AUTO: 0.9 %
BILIRUB SERPL-MCNC: 1.4 MG/DL (ref 0.1–2)
BUN BLD-MCNC: 9 MG/DL (ref 7–18)
BUN/CREAT SERPL: 12.2 (ref 10–20)
CALCIUM BLD-MCNC: 8.4 MG/DL (ref 8.5–10.1)
CHLORIDE SERPL-SCNC: 104 MMOL/L (ref 98–112)
CO2 SERPL-SCNC: 24 MMOL/L (ref 21–32)
CREAT BLD-MCNC: 0.74 MG/DL (ref 0.55–1.02)
DEPRECATED RDW RBC AUTO: 44.4 FL (ref 35.1–46.3)
EOSINOPHIL # BLD AUTO: 0.02 X10(3) UL (ref 0–0.7)
EOSINOPHIL NFR BLD AUTO: 0.6 %
ERYTHROCYTE [DISTWIDTH] IN BLOOD BY AUTOMATED COUNT: 12.1 % (ref 11–15)
GLOBULIN PLAS-MCNC: 4.2 G/DL (ref 2.8–4.4)
GLUCOSE BLD-MCNC: 71 MG/DL (ref 70–99)
HAV IGM SER QL: 2.3 MG/DL (ref 1.6–2.6)
HCT VFR BLD AUTO: 33.8 % (ref 35–48)
HGB BLD-MCNC: 11.6 G/DL (ref 12–16)
IMM GRANULOCYTES # BLD AUTO: 0.01 X10(3) UL (ref 0–1)
IMM GRANULOCYTES NFR BLD: 0.3 %
LYMPHOCYTES # BLD AUTO: 1.55 X10(3) UL (ref 1–4)
LYMPHOCYTES NFR BLD AUTO: 44 %
M PROTEIN MFR SERPL ELPH: 7.3 G/DL (ref 6.4–8.2)
MCH RBC QN AUTO: 34.8 PG (ref 26–34)
MCHC RBC AUTO-ENTMCNC: 34.3 G/DL (ref 31–37)
MCV RBC AUTO: 101.5 FL (ref 80–100)
MONOCYTES # BLD AUTO: 0.38 X10(3) UL (ref 0.1–1)
MONOCYTES NFR BLD AUTO: 10.8 %
NEUTROPHILS # BLD AUTO: 1.53 X10 (3) UL (ref 1.5–7.7)
NEUTROPHILS # BLD AUTO: 1.53 X10(3) UL (ref 1.5–7.7)
NEUTROPHILS NFR BLD AUTO: 43.4 %
OSMOLALITY SERPL CALC.SUM OF ELEC: 279 MOSM/KG (ref 275–295)
PLATELET # BLD AUTO: 78 10(3)UL (ref 150–450)
POTASSIUM SERPL-SCNC: 3.8 MMOL/L (ref 3.5–5.1)
RBC # BLD AUTO: 3.33 X10(6)UL (ref 3.8–5.3)
SODIUM SERPL-SCNC: 136 MMOL/L (ref 136–145)
WBC # BLD AUTO: 3.5 X10(3) UL (ref 4–11)

## 2019-12-21 PROCEDURE — 95816 EEG AWAKE AND DROWSY: CPT | Performed by: OTHER

## 2019-12-21 PROCEDURE — 99223 1ST HOSP IP/OBS HIGH 75: CPT | Performed by: OTHER

## 2019-12-21 RX ORDER — LEVETIRACETAM 1000 MG/1
1000 TABLET ORAL 2 TIMES DAILY
Qty: 180 TABLET | Refills: 0 | Status: SHIPPED | OUTPATIENT
Start: 2019-12-21 | End: 2020-03-16

## 2019-12-21 RX ORDER — HEPARIN SODIUM 5000 [USP'U]/ML
5000 INJECTION, SOLUTION INTRAVENOUS; SUBCUTANEOUS EVERY 8 HOURS SCHEDULED
Status: DISCONTINUED | OUTPATIENT
Start: 2019-12-21 | End: 2019-12-21

## 2019-12-21 RX ORDER — MAGNESIUM OXIDE 400 MG (241.3 MG MAGNESIUM) TABLET
2 TABLET NIGHTLY PRN
Status: DISCONTINUED | OUTPATIENT
Start: 2019-12-21 | End: 2019-12-22

## 2019-12-21 RX ORDER — LEVETIRACETAM 250 MG/1
250 TABLET ORAL 2 TIMES DAILY
Qty: 180 TABLET | Refills: 0 | Status: ON HOLD | OUTPATIENT
Start: 2019-12-21 | End: 2020-04-17

## 2019-12-21 RX ORDER — POTASSIUM CHLORIDE 20 MEQ/1
40 TABLET, EXTENDED RELEASE ORAL ONCE
Status: DISCONTINUED | OUTPATIENT
Start: 2019-12-21 | End: 2019-12-22

## 2019-12-21 NOTE — CONSULTS
Consulted for ICU management. Patient downgraded in ED to tele. Will be available. Call with questions.

## 2019-12-21 NOTE — PLAN OF CARE
Patient is A&O x4, but seems to be slightly forgetful. She did receive ativan in the ED prior to transfer to the unit. VSS- NSR/ST on tele.  in place and SpO2 remains stable on RA. Patient c/o mild headache- obtained order for PRN tylenol.  CIWA protocol

## 2019-12-21 NOTE — CONSULTS
Neurology H&P    Phil Christie Patient Status:  Inpatient    1965 MRN [id number]   [facility] 3NE-A Attending Carolyn Shin MD   [facility] Day # 1 PCP Namrata Joshi DO     Subjective:  Phil Christie is a(n) 49year old female wi alkalosis     Encephalopathy, ALCOHOLIC     Encephalopathy, HEPATIC     Left arm weakness     Memory loss     Seizure disorder (HCC)     Symptomatic localization-related epilepsy (HCC)     Alcohol abuse     Alcohol withdrawal syndrome with complication (HC height 62\", weight 125 lb 4.8 oz (56.8 kg), SpO2 98 %.     Gen: Awake and in no apparent distress  HEENT: moist mucus membranes  Neck: Supple  Cardiovascular: Regular rate and rhythm, no murmur  Pulm: CTAB  GI: non-tender, normal bowel sounds  Skin: normal 10/30/19  IMPRESSION: Unremarkable EEG without any interictal discharges,   electrographic seizure activity or epileptiform activity. However, this does not rule out seizure disorder. Clinical   correlation is advised. Assessment:   This is a 50 y/o fe

## 2019-12-21 NOTE — PLAN OF CARE
Patient is a&ox4.  and RA. Tele and NSR. Denies any current pain. Did have reports of possible aura of seizure this AM of metallic taste in mouth and \"jittery\". No seizure was witnessed. CIWA score given of 8 and received 1 mg Ativan IVP per MAR.  Renita

## 2019-12-21 NOTE — H&P
[facility] Hospitalist H&P       CC: Patient presents with:  Seizure Disorder       PCP: Taj Francis DO    History of Present Illness:  Patient is a 49year old female with PMH sig for CVA with subsequent seizure d/o, etoh abuse, HTN, depression presented to 1250mg twice daily, Disp: 180 tablet, Rfl: 0  famotidine 10 MG Oral Tab, Take 10 mg by mouth daily as needed for Heartburn., Disp: , Rfl:   atorvastatin 40 MG Oral Tab, Take 1 tablet (40 mg total) by mouth nightly., Disp: 30 tablet, Rfl: 1  folic acid 1 MG Father         Parkinsons   • Neurological Disorder Mother         alzhemiers and Parkinsons       Review of Systems  Comprehensive ROS reviewed and negative except for what's stated above.        OBJECTIVE:  /71   Pulse 66   Temp 98.1 °F (36.7 °C) (O eliquis    Dispo: admit    Vitaliy Gallardo MD  [facility] IM Hospitalist  Pager: [phone]

## 2019-12-21 NOTE — PLAN OF CARE
Admission navigator completed by admission RN. Patient settled in bed, and orientated to room. Report given to accepting RN. Admission navigator completed with . Diarrhea and depression screening to be done with patient when she is awake.

## 2019-12-22 VITALS
OXYGEN SATURATION: 98 % | RESPIRATION RATE: 15 BRPM | BODY MASS INDEX: 23.06 KG/M2 | HEIGHT: 62 IN | HEART RATE: 66 BPM | TEMPERATURE: 98 F | SYSTOLIC BLOOD PRESSURE: 107 MMHG | DIASTOLIC BLOOD PRESSURE: 63 MMHG | WEIGHT: 125.31 LBS

## 2019-12-22 LAB
ALBUMIN SERPL-MCNC: 3.2 G/DL (ref 3.4–5)
ALBUMIN/GLOB SERPL: 0.8 {RATIO} (ref 1–2)
ALP LIVER SERPL-CCNC: 88 U/L (ref 41–108)
ALT SERPL-CCNC: 80 U/L (ref 13–56)
ANION GAP SERPL CALC-SCNC: 4 MMOL/L (ref 0–18)
AST SERPL-CCNC: 249 U/L (ref 15–37)
BASOPHILS # BLD AUTO: 0.03 X10(3) UL (ref 0–0.2)
BASOPHILS NFR BLD AUTO: 0.8 %
BILIRUB SERPL-MCNC: 1.2 MG/DL (ref 0.1–2)
BUN BLD-MCNC: 6 MG/DL (ref 7–18)
BUN/CREAT SERPL: 7.6 (ref 10–20)
CALCIUM BLD-MCNC: 8.7 MG/DL (ref 8.5–10.1)
CHLORIDE SERPL-SCNC: 106 MMOL/L (ref 98–112)
CO2 SERPL-SCNC: 29 MMOL/L (ref 21–32)
CREAT BLD-MCNC: 0.79 MG/DL (ref 0.55–1.02)
DEPRECATED RDW RBC AUTO: 45.5 FL (ref 35.1–46.3)
EOSINOPHIL # BLD AUTO: 0.06 X10(3) UL (ref 0–0.7)
EOSINOPHIL NFR BLD AUTO: 1.6 %
ERYTHROCYTE [DISTWIDTH] IN BLOOD BY AUTOMATED COUNT: 12.2 % (ref 11–15)
GLOBULIN PLAS-MCNC: 4 G/DL (ref 2.8–4.4)
GLUCOSE BLD-MCNC: 116 MG/DL (ref 70–99)
HCT VFR BLD AUTO: 34.1 % (ref 35–48)
HGB BLD-MCNC: 11.5 G/DL (ref 12–16)
IMM GRANULOCYTES # BLD AUTO: 0.02 X10(3) UL (ref 0–1)
IMM GRANULOCYTES NFR BLD: 0.5 %
LEVETIRACETAM (KEPPRA): 59 UG/ML
LYMPHOCYTES # BLD AUTO: 1.82 X10(3) UL (ref 1–4)
LYMPHOCYTES NFR BLD AUTO: 47.2 %
M PROTEIN MFR SERPL ELPH: 7.2 G/DL (ref 6.4–8.2)
MCH RBC QN AUTO: 34.7 PG (ref 26–34)
MCHC RBC AUTO-ENTMCNC: 33.7 G/DL (ref 31–37)
MCV RBC AUTO: 103 FL (ref 80–100)
MONOCYTES # BLD AUTO: 0.41 X10(3) UL (ref 0.1–1)
MONOCYTES NFR BLD AUTO: 10.6 %
NEUTROPHILS # BLD AUTO: 1.52 X10 (3) UL (ref 1.5–7.7)
NEUTROPHILS # BLD AUTO: 1.52 X10(3) UL (ref 1.5–7.7)
NEUTROPHILS NFR BLD AUTO: 39.3 %
OSMOLALITY SERPL CALC.SUM OF ELEC: 287 MOSM/KG (ref 275–295)
PLATELET # BLD AUTO: 71 10(3)UL (ref 150–450)
POTASSIUM SERPL-SCNC: 3.8 MMOL/L (ref 3.5–5.1)
RBC # BLD AUTO: 3.31 X10(6)UL (ref 3.8–5.3)
SODIUM SERPL-SCNC: 139 MMOL/L (ref 136–145)
WBC # BLD AUTO: 3.9 X10(3) UL (ref 4–11)

## 2019-12-22 PROCEDURE — 99232 SBSQ HOSP IP/OBS MODERATE 35: CPT | Performed by: OTHER

## 2019-12-22 RX ORDER — SODIUM CHLORIDE 9 MG/ML
INJECTION, SOLUTION INTRAVENOUS CONTINUOUS
Status: DISCONTINUED | OUTPATIENT
Start: 2019-12-22 | End: 2019-12-22

## 2019-12-22 NOTE — PROCEDURES
POLLY - ELECTROENCEPHALOGRAM (EEG) REPORT  Patient Name:  Yanely Orellana   MRN / CSN:  [id number] / [id number]   Date of Birth / Age:  5/5/1965 /  49year old   Encounter Date:  12/21/19         METHODS:  Twenty-two electrodes were applied according to the covers  Start 12/21/19 at [address] [place] [address] 12/21/19 at 2011    SIGNATURES:  Jacquelin Hernandez [address] Neurology

## 2019-12-22 NOTE — DISCHARGE SUMMARY
General Medicine Discharge Summary     Patient ID:  Rafael Holcomb  49year old  5/5/1965    Admit date: 12/20/2019    Discharge date and time: 12/22/2019 11:55 AM     Attending Physician: Nazia att.  prov NEUROLOGY  IP CONSULT TO PULMONOLOGY  IP CONSULT TO NEUROLOGY    Radiology reports:    No results found.      Operative Procedures:        Patient instructions:      Discharge Medication List as of 12/22/2019 10:58 AM    CONTINUE these medications which hav

## 2019-12-22 NOTE — PLAN OF CARE
Assumed care at [address] [facility]. Pt. A&Ox 4, on RA. Tele, NSR. Ambulating in room. No pain reported. CIWAs below 1. EEG done, results back. Fall precautions in place. Will continue to monitor.      Problem: Patient/Family Goals  Goal: Patient/Family Long Term Goal  Ila Jacobo

## 2019-12-22 NOTE — PROGRESS NOTES
NURSING DISCHARGE NOTE  PIV removed by PCT, patient tolerated well. Patient discharge education completed with patient and . All questions answered. Discharged Home via Wheelchair.   Accompanied by Family member and Support staff  Belongings

## 2019-12-22 NOTE — PROGRESS NOTES
Neurology Progress Note    Ashleeyue Dempsey Patient Status:  Inpatient    1965 MRN [id number]   [facility] 3NE-A Attending Magy Vázquez MD   [facility] Day # 2 PCP Iman Michaud DO     Subjective:  Pt was seen and examined in bed this 12/22/2019    ALB 3.2 12/22/2019    ALKPHO 88 12/22/2019    BILT 1.2 12/22/2019    TP 7.2 12/22/2019     12/22/2019    ALT 80 12/22/2019       Imaging:  EEG 12/21/19  IMPRESSION:    This EEG is a mildly abnormal study recorded in the awake states

## 2019-12-24 ENCOUNTER — TELEPHONE (OUTPATIENT)
Dept: NEUROLOGY | Facility: CLINIC | Age: 54
End: 2019-12-24

## 2019-12-24 NOTE — PAYOR COMM NOTE
REQUESTING 2 INPT DAYS    ADMISSION REVIEW     Payor: Arianne Laughlin  #:  [id number]  Authorization Number: [id number]    Admit date: 12/20/19  Admit time: 2225       Admitting Physician:   Attending Physician:  No att. providers found  Primar nauseous.     Past Medical History:   Diagnosis Date   • [facility]    ?etoh   • CORONARY ARTERY DISEASE    • Depression    • Heart attack ([facility] 05/21/2012   • High blood pressure    • History of blood clots     legs; heart; head   • History normal.      Pupils: Pupils are equal, round, and reactive to light. Neck:      Musculoskeletal: Normal range of motion and neck supple. Cardiovascular:      Rate and Rhythm: Normal rate and regular rhythm. Heart sounds: Normal heart sounds.    Pul MDM     44-year-old female with history of prior stroke with residual left-sided weakness, seizure disorder, history of alcohol abuse presenting after a brief 1 minute seizure at home.   She received Versed from paramedics on arrival is sean withdrawal.      A total of 35 minutes of critical care time (exclusive of billable procedures) was administered to manage the patient's neurologic instability due to her recurrent seizures.   This involved direct patient intervention, complex decision adeline excessively.      [facility]  Past Medical History:   Diagnosis Date   • Cardiomyopathy ([facility] 11/13    ?etoh   • CORONARY ARTERY DISEASE    • Depression    • Heart attack ([facility] 05/21/2012   • High blood pressure    • History of blood clots     legs; heart; head tablet, Rfl: 3  Metoprolol Succinate ER 25 MG Oral Tablet 24 Hr, Take 1 tablet (25 mg total) by mouth nightly., Disp: 90 tablet, Rfl: 3        Scheduled Medication:  • Potassium Chloride ER  40 mEq Oral Once   • levETIRAcetam  1,250 mg Oral BID   • ondanse Data:    CBC/Chem  Recent Labs   Lab 12/20/19  1731 12/20/19  2322 12/21/19  0646   WBC 4.3  --  3.5*   HGB 12.7  --  11.6*   MCV 98.9  --  101.5*   PLT 87.0*  --  78.0*   INR  --  1.41*  --        Recent Labs   Lab 12/20/19  1731 12/21/19  0646   * presentations in the past. She reports that she did not fall. She did not lose consciousness. She has not hit her head. She feels at her baseline at this time. Reports that she actually feel \"pretty good this morning\". No tremulousness.  No complaints at 12/20/2019     MG 2.3 12/21/2019     Assessment: This is a 50 y/o female with a PMH significant for EtOH abuse, previous R parieto-temporal-occipital lobe stroke due to medication non compliance and seizures.  She presented with a seizure at home yesterday activity is seen and no seizures are recorded.  Clinical correlation is suggested     Report covers  Start 12/21/19 at [address]  End 12/21/19 at 2011 12/22 [address] [facility] Day # 2 PCP Bridgman Spikes, DO      Subjective:  Pt was seen and examined in bed activity can be seen patients   taking sedating medications such as benzodiazepines but is non-   specific.  No focal, lateralizing, or epileptiform activity is   seen and no seizures are recorded. Clinical correlation is   suggested     Assessment:   This Discharge Condition: stable    Disposition: home    Primary Diagnosis at discharge from Hospital: Other: seizures, etoh withdrawal; still recommend for TCM follow-up    Please note that only [facility] and [facility] patients enrolled in the Medicare ACO, BCBS A (1,000 mg total) by mouth 2 (two) times daily. Take with 250mg tablet twice daily for total dose 1250mg twice daily, Normal, Disp-180 tablet, R-0    !! levetiracetam 250 MG Oral Tab  Take 1 tablet (250 mg total) by mouth 2 (two) times daily.  Take with 1000

## 2019-12-26 DIAGNOSIS — I63.50 CEREBRAL ARTERY OCCLUSION WITH CEREBRAL INFARCTION (HCC): Primary | ICD-10-CM

## 2019-12-26 RX ORDER — ATORVASTATIN CALCIUM 40 MG/1
40 TABLET, FILM COATED ORAL NIGHTLY
Qty: 30 TABLET | Refills: 2 | Status: SHIPPED | OUTPATIENT
Start: 2019-12-26 | End: 2020-03-27

## 2019-12-26 NOTE — TELEPHONE ENCOUNTER
Medication: ATORVASTATIN 40 MG Oral Tab    Date of last refill: 10/31/19 (#30/1)  [place]    Last office visit: 10/14/19  Due back to clinic per last office note:  Around 12/14/19  Date next office visit scheduled:

## 2020-01-02 NOTE — PAYOR COMM NOTE
--------------  WE ARE STILL AWAITING YOUR DETERMINATION ON THIS INPT ADMISSION.     PLEASE FAX INPT DAYS AUTHORIZED ASAP -[phone]    [facility] YOU    DISCHARGE REVIEW    Payor: Arianne Zapata #:  [id number]  Authorization Number: [id number]  [phone]      4-6 weeks      No future appointments. Hospital Course:      # Seizures  - h/o CVA with subsequent seizure d/o.  Etoh hx possibly contributing  - apprec neuro recs  - keppra increased per neuro  - seizure precautions  - EEG wit by mouth daily. , Historical    Sertraline HCl 100 MG Oral Tab  Take 100 mg by mouth daily. , Historical    apixaban (ELIQUIS) 5 MG Oral Tab  Take 1 tablet (5 mg total) by mouth 2 (two) times daily. , Normal, Disp-60 tablet, R-11    lisinopril 5 MG Oral Tab

## 2020-03-03 RX ORDER — LISINOPRIL 5 MG/1
TABLET ORAL
Qty: 90 TABLET | Refills: 2 | Status: SHIPPED | OUTPATIENT
Start: 2020-03-03 | End: 2020-12-10

## 2020-03-03 RX ORDER — METOPROLOL SUCCINATE 25 MG/1
TABLET, EXTENDED RELEASE ORAL
Qty: 90 TABLET | Refills: 2 | Status: SHIPPED | OUTPATIENT
Start: 2020-03-03 | End: 2020-12-10

## 2020-03-12 ENCOUNTER — APPOINTMENT (OUTPATIENT)
Dept: CT IMAGING | Facility: HOSPITAL | Age: 55
End: 2020-03-12
Attending: EMERGENCY MEDICINE
Payer: COMMERCIAL

## 2020-03-12 ENCOUNTER — HOSPITAL ENCOUNTER (OUTPATIENT)
Facility: HOSPITAL | Age: 55
Setting detail: OBSERVATION
Discharge: HOME | End: 2020-03-13
Attending: EMERGENCY MEDICINE | Admitting: HOSPITALIST
Payer: COMMERCIAL

## 2020-03-12 ENCOUNTER — TELEPHONE (OUTPATIENT)
Dept: NEUROLOGY | Facility: CLINIC | Age: 55
End: 2020-03-12

## 2020-03-12 DIAGNOSIS — G83.84 TODD'S PARALYSIS (HCC): ICD-10-CM

## 2020-03-12 DIAGNOSIS — G40.909 SEIZURE DISORDER (HCC): Primary | ICD-10-CM

## 2020-03-12 LAB
ALBUMIN SERPL-MCNC: 3.9 G/DL (ref 3.4–5)
ALBUMIN/GLOB SERPL: 0.7 {RATIO} (ref 1–2)
ALP LIVER SERPL-CCNC: 116 U/L (ref 41–108)
ALT SERPL-CCNC: 90 U/L (ref 13–56)
ANION GAP SERPL CALC-SCNC: 18 MMOL/L (ref 0–18)
AST SERPL-CCNC: 244 U/L (ref 15–37)
BASOPHILS # BLD AUTO: 0.08 X10(3) UL (ref 0–0.2)
BASOPHILS NFR BLD AUTO: 0.9 %
BILIRUB SERPL-MCNC: 1.1 MG/DL (ref 0.1–2)
BUN BLD-MCNC: 7 MG/DL (ref 7–18)
BUN/CREAT SERPL: 6.1 (ref 10–20)
CALCIUM BLD-MCNC: 9.2 MG/DL (ref 8.5–10.1)
CHLORIDE SERPL-SCNC: 105 MMOL/L (ref 98–112)
CO2 SERPL-SCNC: 14 MMOL/L (ref 21–32)
CREAT BLD-MCNC: 1.14 MG/DL (ref 0.55–1.02)
DEPRECATED RDW RBC AUTO: 48.6 FL (ref 35.1–46.3)
EOSINOPHIL # BLD AUTO: 0.06 X10(3) UL (ref 0–0.7)
EOSINOPHIL NFR BLD AUTO: 0.7 %
ERYTHROCYTE [DISTWIDTH] IN BLOOD BY AUTOMATED COUNT: 12.1 % (ref 11–15)
ETHANOL SERPL-MCNC: <3 MG/DL (ref ?–3)
GLOBULIN PLAS-MCNC: 5.6 G/DL (ref 2.8–4.4)
GLUCOSE BLD-MCNC: 148 MG/DL (ref 70–99)
HAV IGM SER QL: 1.9 MG/DL (ref 1.6–2.6)
HCT VFR BLD AUTO: 42.1 % (ref 35–48)
HGB BLD-MCNC: 14 G/DL (ref 12–16)
IMM GRANULOCYTES # BLD AUTO: 0.04 X10(3) UL (ref 0–1)
IMM GRANULOCYTES NFR BLD: 0.5 %
LYMPHOCYTES # BLD AUTO: 4.26 X10(3) UL (ref 1–4)
LYMPHOCYTES NFR BLD AUTO: 49.4 %
M PROTEIN MFR SERPL ELPH: 9.5 G/DL (ref 6.4–8.2)
MCH RBC QN AUTO: 36.1 PG (ref 26–34)
MCHC RBC AUTO-ENTMCNC: 33.3 G/DL (ref 31–37)
MCV RBC AUTO: 108.5 FL (ref 80–100)
MONOCYTES # BLD AUTO: 0.61 X10(3) UL (ref 0.1–1)
MONOCYTES NFR BLD AUTO: 7.1 %
NEUTROPHILS # BLD AUTO: 3.58 X10 (3) UL (ref 1.5–7.7)
NEUTROPHILS # BLD AUTO: 3.58 X10(3) UL (ref 1.5–7.7)
NEUTROPHILS NFR BLD AUTO: 41.4 %
OSMOLALITY SERPL CALC.SUM OF ELEC: 285 MOSM/KG (ref 275–295)
PLATELET # BLD AUTO: 153 10(3)UL (ref 150–450)
POTASSIUM SERPL-SCNC: 4 MMOL/L (ref 3.5–5.1)
RBC # BLD AUTO: 3.88 X10(6)UL (ref 3.8–5.3)
SODIUM SERPL-SCNC: 137 MMOL/L (ref 136–145)
TROPONIN I SERPL-MCNC: <0.045 NG/ML (ref ?–0.04)
WBC # BLD AUTO: 8.6 X10(3) UL (ref 4–11)

## 2020-03-12 PROCEDURE — 70450 CT HEAD/BRAIN W/O DYE: CPT | Performed by: EMERGENCY MEDICINE

## 2020-03-12 RX ORDER — ONDANSETRON 2 MG/ML
INJECTION INTRAMUSCULAR; INTRAVENOUS
Status: COMPLETED
Start: 2020-03-12 | End: 2020-03-12

## 2020-03-12 RX ORDER — LORAZEPAM 2 MG/ML
2 INJECTION INTRAMUSCULAR ONCE
Status: COMPLETED | OUTPATIENT
Start: 2020-03-12 | End: 2020-03-12

## 2020-03-13 VITALS
HEART RATE: 110 BPM | RESPIRATION RATE: 17 BRPM | DIASTOLIC BLOOD PRESSURE: 82 MMHG | SYSTOLIC BLOOD PRESSURE: 139 MMHG | OXYGEN SATURATION: 95 % | TEMPERATURE: 99 F | WEIGHT: 115 LBS | BODY MASS INDEX: 21.16 KG/M2 | HEIGHT: 62 IN

## 2020-03-13 PROBLEM — G83.84 TODD'S PARALYSIS (HCC): Status: ACTIVE | Noted: 2020-03-13

## 2020-03-13 LAB
ALBUMIN SERPL-MCNC: 2.8 G/DL (ref 3.4–5)
ALBUMIN/GLOB SERPL: 0.7 {RATIO} (ref 1–2)
ALP LIVER SERPL-CCNC: 82 U/L (ref 41–108)
ALT SERPL-CCNC: 58 U/L (ref 13–56)
ANION GAP SERPL CALC-SCNC: 7 MMOL/L (ref 0–18)
AST SERPL-CCNC: 164 U/L (ref 15–37)
ATRIAL RATE: 127 BPM
BILIRUB SERPL-MCNC: 1 MG/DL (ref 0.1–2)
BILIRUB UR QL STRIP.AUTO: NEGATIVE
BUN BLD-MCNC: 5 MG/DL (ref 7–18)
BUN/CREAT SERPL: 6.9 (ref 10–20)
CALCIUM BLD-MCNC: 8 MG/DL (ref 8.5–10.1)
CHLORIDE SERPL-SCNC: 111 MMOL/L (ref 98–112)
CLARITY UR REFRACT.AUTO: CLEAR
CO2 SERPL-SCNC: 23 MMOL/L (ref 21–32)
COLOR UR AUTO: YELLOW
CREAT BLD-MCNC: 0.72 MG/DL (ref 0.55–1.02)
GLOBULIN PLAS-MCNC: 4 G/DL (ref 2.8–4.4)
GLUCOSE BLD-MCNC: 102 MG/DL (ref 70–99)
GLUCOSE UR STRIP.AUTO-MCNC: NEGATIVE MG/DL
KETONES UR STRIP.AUTO-MCNC: NEGATIVE MG/DL
M PROTEIN MFR SERPL ELPH: 6.8 G/DL (ref 6.4–8.2)
NITRITE UR QL STRIP.AUTO: NEGATIVE
OSMOLALITY SERPL CALC.SUM OF ELEC: 289 MOSM/KG (ref 275–295)
P AXIS: 15 DEGREES
P-R INTERVAL: 198 MS
PH UR STRIP.AUTO: 5 [PH] (ref 4.5–8)
POTASSIUM SERPL-SCNC: 3.3 MMOL/L (ref 3.5–5.1)
PROT UR STRIP.AUTO-MCNC: NEGATIVE MG/DL
Q-T INTERVAL: 278 MS
QRS DURATION: 84 MS
QTC CALCULATION (BEZET): 404 MS
R AXIS: 34 DEGREES
RBC UR QL AUTO: NEGATIVE
SODIUM SERPL-SCNC: 141 MMOL/L (ref 136–145)
SP GR UR STRIP.AUTO: 1.02 (ref 1–1.03)
T AXIS: 78 DEGREES
UROBILINOGEN UR STRIP.AUTO-MCNC: 2 MG/DL
VENTRICULAR RATE: 127 BPM

## 2020-03-13 PROCEDURE — 99223 1ST HOSP IP/OBS HIGH 75: CPT | Performed by: OTHER

## 2020-03-13 RX ORDER — SODIUM CHLORIDE 9 MG/ML
INJECTION, SOLUTION INTRAVENOUS CONTINUOUS
Status: DISCONTINUED | OUTPATIENT
Start: 2020-03-13 | End: 2020-03-13

## 2020-03-13 RX ORDER — LORAZEPAM 2 MG/ML
1 INJECTION INTRAMUSCULAR EVERY 6 HOURS PRN
Status: DISCONTINUED | OUTPATIENT
Start: 2020-03-13 | End: 2020-03-13

## 2020-03-13 RX ORDER — METOPROLOL SUCCINATE 25 MG/1
25 TABLET, EXTENDED RELEASE ORAL EVERY EVENING
Status: DISCONTINUED | OUTPATIENT
Start: 2020-03-13 | End: 2020-03-13

## 2020-03-13 RX ORDER — ACETAMINOPHEN 325 MG/1
650 TABLET ORAL EVERY 6 HOURS PRN
Status: DISCONTINUED | OUTPATIENT
Start: 2020-03-13 | End: 2020-03-13

## 2020-03-13 RX ORDER — FOLIC ACID 1 MG/1
1 TABLET ORAL
Status: DISCONTINUED | OUTPATIENT
Start: 2020-03-13 | End: 2020-03-13

## 2020-03-13 RX ORDER — ONDANSETRON 2 MG/ML
4 INJECTION INTRAMUSCULAR; INTRAVENOUS EVERY 6 HOURS PRN
Status: DISCONTINUED | OUTPATIENT
Start: 2020-03-13 | End: 2020-03-13

## 2020-03-13 RX ORDER — ENOXAPARIN SODIUM 100 MG/ML
40 INJECTION SUBCUTANEOUS DAILY
Status: DISCONTINUED | OUTPATIENT
Start: 2020-03-13 | End: 2020-03-13

## 2020-03-13 RX ORDER — SERTRALINE HYDROCHLORIDE 100 MG/1
100 TABLET, FILM COATED ORAL
Status: DISCONTINUED | OUTPATIENT
Start: 2020-03-13 | End: 2020-03-13

## 2020-03-13 RX ORDER — LISINOPRIL 5 MG/1
5 TABLET ORAL DAILY
Status: DISCONTINUED | OUTPATIENT
Start: 2020-03-13 | End: 2020-03-13

## 2020-03-13 RX ORDER — ATORVASTATIN CALCIUM 40 MG/1
40 TABLET, FILM COATED ORAL NIGHTLY
Status: DISCONTINUED | OUTPATIENT
Start: 2020-03-13 | End: 2020-03-13

## 2020-03-13 NOTE — TELEPHONE ENCOUNTER
I was called by the patients . He says that his wife has had 3-4 episodes of strong smell followed by eye version to the left and some trembling of her L hand over the past hour or so. She has not had any LOC or AMS or full blown seizure.  This is si

## 2020-03-13 NOTE — ED INITIAL ASSESSMENT (HPI)
Patient presents for evaluation of seizure. History of seizures in the past.  states 3-4 seizures today.  Took 1000 mg of Keppra prior to arrival. Patient actively seizing on arrival.

## 2020-03-13 NOTE — PLAN OF CARE
Problem: Patient/Family Goals  Goal: Patient/Family Long Term Goal  Description  Patient's Long Term Goal:     Interventions:  -   - See additional Care Plan goals for specific interventions  Outcome: Adequate for Discharge  Goal: Patient/Family Demetrius Pal care with guidance from PT/OT  - Encourage visually impaired, hearing impaired and aphasic patients to use assistive/communication devices  Outcome: Adequate for Discharge

## 2020-03-13 NOTE — ED PROVIDER NOTES
Patient Seen in: [facility] Emergency Department      History   Patient presents with:  Seizure Disorder    Stated Complaint: seizure    HPI    Bibi Delgado is a 52-year-old woman with history of seizure disorder coronary disease stroke history of alcoho 23.3      Smokeless tobacco: Never Used    Alcohol use:  Yes      Alcohol/week: 3.0 standard drinks      Types: 3 Cans of beer per week      Frequency: 4 or more times a week      Binge frequency: Daily or almost daily      Comment: 3 cans beer/day    Drug 14.0 (*)     Creatinine 1.14 (*)     BUN/CREA Ratio 6.1 (*)     GFR, Non- 55 (*)      (*)     ALT 90 (*)     Alkaline Phosphatase 116 (*)     Total Protein 9.5 (*)     Globulin  5.6 (*)     A/G Ratio 0.7 (*)     All other components other components within normal limits   ETHYL ALCOHOL - Normal   TROPONIN I - Normal   MAGNESIUM - Normal   CBC WITH DIFFERENTIAL WITH PLATELET    Narrative: The following orders were created for panel order CBC WITH DIFFERENTIAL WITH PLATELET.   Proced side.  Most commonly the patient gaze is     midline/central, and this can be correlated clinically. CONCLUSION:  The patient is gazing to the left side.   Most commonly the     patient gaze on CT scan is midline/central, this can be correlated critical care time (exclusive of billable procedures) was administered to manage the patient's critical lab values and neurologic instability due to her recurrent seizure.   This involved direct patient intervention, complex decision making, and/or extensiv

## 2020-03-13 NOTE — PAYOR COMM NOTE
--------------  ADMISSION REVIEW     Payor: Arianne Laughlin  #:  [id number]  Authorization Number: [id number]    Admit date: 3/13/20  Admit time: 0116       Admitting Physician: Sanam Hirsch MD  Attending Physician:  Sanam Hirsch MD  Primary Past Surgical History:   Procedure Laterality Date   • ANGIOGRAM  13    minimal CAD in the RCA   • ANGIOPLASTY (CORONARY)  12    LAD-thrombotic event   •       x1   • IR ANGIOGRAM CEREBRAL CAROTID BILATERAL  2018 Abnormal; Notable for the following components:       Result Value    Glucose 148 (*)     CO2 14.0 (*)     Creatinine 1.14 (*)     BUN/CREA Ratio 6.1 (*)     GFR, Non- 55 (*)      (*)     ALT 90 (*)     Alkaline Phosphatase 116 (*) .5 (*)     MCH 36.1 (*)     RDW-SD 48.6 (*)     Lymphocyte Absolute 4.26 (*)     All other components within normal limits   ETHYL ALCOHOL - Normal   TROPONIN I - Normal   MAGNESIUM - Normal   CBC WITH DIFFERENTIAL WITH PLATELET    Narrative:      Alejandra Reed sphenoid sinus. Mastoid air cells appear clear. The patient is gazing to the left side. Most commonly the patient gaze is     midline/central, and this can be correlated clinically. CONCLUSION:  The patient is gazing to the left side. admission.         Admission disposition: 3/12/2020  9:55 PM             A total of 35 minutes of critical care time (exclusive of billable procedures) was administered to manage the patient's critical lab values and neurologic instability due to her recurr

## 2020-03-13 NOTE — PLAN OF CARE
Admission     Patient admitted in stable condition. Patient drowsy, postictal. Slight tremors noted to hands. IVF infusing as ordered. Eliquis given this am as ordered. Meds to be verified with . Yany consulted in ER. Report given to oncoming RN.

## 2020-03-13 NOTE — PHYSICAL THERAPY NOTE
PHYSICAL THERAPY QUICK EVALUATION - INPATIENT    Room Number: 7148/4476-F  Evaluation Date: 3/13/2020  Presenting Problem: Seizure Disorder  Physician Order: PT Eval and Treat    Problem List  Principal Problem:    Seizure disorder [facility])  Active Problems MOBILITY  How much difficulty does the patient currently have. ..  -   Turning over in bed (including adjusting bedclothes, sheets and blankets)?: None   -   Sitting down on and standing up from a chair with arms (e.g., wheelchair, bedside commode, etc.): N Turning to look over left and right shoulders while standing         4  6. Standing unsupported one foot in front                                      2  7.  Standing on one leg                                                                2          Antionette

## 2020-03-13 NOTE — PLAN OF CARE
NURSING DISCHARGE NOTE    Discharged Home via Ambulatory. Accompanied by Spouse  Belongings Taken by patient/family.   Patient refusing potassium replacement    Patient educated on medications, s/s to monitor for, follow up appointments, and when to ca

## 2020-03-13 NOTE — CM/SW NOTE
Pt seen for PHQ4. Pt says she has been diagnosed with depression and takes Zoloft prescibed by her PCP. Gave pt REYNA handout for additional services at [facility] if needed. Pt should not have any other d/c needs.

## 2020-03-13 NOTE — H&P
.  CC: Patient presents with:  Seizure Disorder       PCP: Daniela Francois DO    History of Present Illness: Patient is a 49year old female with PMH sig for seizure, etoh abuse, cardiomyopathy, CAD.  Pt had seizure at home- she drinks 3 large bottles of bee Hypertension Father    • Neurological Disorder Father         Parkinsons   • Neurological Disorder Mother         alzhemiers and Parkinsons       Review of Systems  Comprehensive ROS reviewed and negative except for what's stated above.        OBJECTIVE:  B along with old right occipital and parietal as well as posterior frontoparietal infarcts. These are unchanged since the prior exam.  No acute hemorrhage. No CT features for acute infarct.   No midline shift, mass effect, hydrocephalus, herniation, acute h

## 2020-03-14 LAB — LEVETIRACETAM (KEPPRA): 57 UG/ML

## 2020-03-15 DIAGNOSIS — G40.909 SEIZURE DISORDER (HCC): Primary | ICD-10-CM

## 2020-03-16 RX ORDER — LEVETIRACETAM 1000 MG/1
1000 TABLET ORAL 2 TIMES DAILY
Qty: 180 TABLET | Refills: 0 | Status: SHIPPED | OUTPATIENT
Start: 2020-03-16 | End: 2020-05-26

## 2020-03-16 RX ORDER — LEVETIRACETAM 250 MG/1
TABLET ORAL
Qty: 180 TABLET | Refills: 0 | OUTPATIENT
Start: 2020-03-16

## 2020-03-16 RX ORDER — LEVETIRACETAM 500 MG/1
500 TABLET ORAL 2 TIMES DAILY
Qty: 180 TABLET | Refills: 0 | Status: SHIPPED | OUTPATIENT
Start: 2020-03-16 | End: 2020-06-12

## 2020-03-16 NOTE — TELEPHONE ENCOUNTER
Per TE 3/12/20 pt to increase to 1500 mg BID. Pended new rx for 500 mg tabs to go with 1000 mg tabs.      Medication: LEVETIRACETAM 1000 MG    Date of last refill: 12/21/19 (#180/0)  [place]    Last office visit: 10/14

## 2020-03-16 NOTE — PAYOR COMM NOTE
REQUESTING 1 INPT DAY    ADMISSION REVIEW     Payor: Arianne Laughlin  #:  [id number]  Authorization Number: [id number]    Admit date: 3/13/20  Admit time: 0116       Admitting Physician: Maykel Gross MD  Attending Physician:  Nazia att. providers unspecified([phone])     pt denies having this 10/29/19   • Psychiatric disorder    • Seizure disorder [facility])    • Stroke [facility]) 04/2015   • Unspecified essential hypertension               Past Surgical History:   Procedure Laterality Date   • ANGIOGRAM  11/13/ normal. No respiratory distress. Abdominal:      General: There is no distension. Palpations: Abdomen is soft. Tenderness: There is no tenderness. Musculoskeletal: Normal range of motion. General: No tenderness.    Skin:     General: S wave progression no ischemic changes              Labs Reviewed   COMP METABOLIC PANEL (14) - Abnormal; Notable for the following components:       Result Value    Glucose 148 (*)     CO2 14.0 (*)     Creatinine 1.14 (*)     BUN/CREA Ratio 6.1 (*)     GFR, Technologist)  Patient with     seizure like activity today. FINDINGS:           Cerebral and cerebellar atrophy redemonstrated, along with old right     occipital and parietal as well as posterior frontoparietal infarcts.       These are unc she was actively seizing. Blood work was obtained which is noted above. She does have low bicarb most likely related to her seizure. CT scan of the brain did not show any acute findings.   They do mention a gaze to the left side but on my reexamination a 1250 mg BID. She presented to the ED last night with a seizure at home. Per chart she had about 3-4 seizures involving left side but did not lose consciousness completely.  She continues to drink about 3 bottle of beer everyday and did not any drinks for 1 head: 3/12/2020  CONCLUSION:  The patient is gazing to the left side.  Most commonly the patient gaze on CT scan is midline/central, this can be correlated clinically.  Otherwise, stable chronic findings including right-sided cerebral infarcts and cerebral

## 2020-03-27 DIAGNOSIS — I63.50 CEREBRAL ARTERY OCCLUSION WITH CEREBRAL INFARCTION (HCC): ICD-10-CM

## 2020-03-27 RX ORDER — ATORVASTATIN CALCIUM 40 MG/1
TABLET, FILM COATED ORAL
Qty: 30 TABLET | Refills: 0 | Status: SHIPPED | OUTPATIENT
Start: 2020-03-27 | End: 2020-05-26

## 2020-04-16 ENCOUNTER — TELEPHONE (OUTPATIENT)
Dept: NEUROLOGY | Facility: CLINIC | Age: 55
End: 2020-04-16

## 2020-04-16 ENCOUNTER — APPOINTMENT (OUTPATIENT)
Dept: CT IMAGING | Facility: HOSPITAL | Age: 55
DRG: 101 | End: 2020-04-16
Attending: EMERGENCY MEDICINE
Payer: COMMERCIAL

## 2020-04-16 PROCEDURE — 70450 CT HEAD/BRAIN W/O DYE: CPT | Performed by: EMERGENCY MEDICINE

## 2020-04-17 NOTE — H&P
[facility] Hospitalist History and Physical      CC: ETOH abuse, aura concern for seizure    PCP: Judy Lawler DO    History of Present Illness: Patient is a 49year old female with PMH sig for longstanding hx of ETOH abuse, CAD, depression, blood clots here du daily.  Take 1 tablet with 1000 mg tab for total of 1500 mg twice a day, Disp: 180 tablet, Rfl: 0  METOPROLOL SUCCINATE ER 25 MG Oral Tablet 24 Hr, TAKE ONE TABLET BY MOUTH EVERY EVENING, Disp: 90 tablet, Rfl: 2  LISINOPRIL 5 MG Oral Tab, TAKE ONE TABLET BY BMI 22.14 kg/m²     Gen: No acute distress  Eyes: Pink conjunctivae, No ptosis  Neck: No masses, trachae midline  Pulm: Lungs clear bilaterally, normal respiratory effort  CV: Heart with regular rate and rhythm  GI: Abdomen soft, nontender, nondistended  E per day, has + withdrawal symptoms when not drinking  - She at this time would like to detox in the hospital - though would like to discuss again tomorrow.  She discussed that if medically stable tomorrow and she plans to return home and continue drinking t

## 2020-04-17 NOTE — PROGRESS NOTES
Pulmonary Progress Note        NAME: Janine Lind - ROOM: 6414/3098-A - MRN: [id number] - Age: 49year old - : 1965        Last 24hrs: No events overnight, notes a bit of a headache this AM    OBJECTIVE:   20  0200 20  0300 20 ADAT  8. Prophy; eliquis  9. Dispo: full code. ICU.    [facility] Pulmonary and Critical Care

## 2020-04-17 NOTE — BH PROGRESS NOTE
This writer met with the pt to discuss etoh use and to assess to see if pt would want etoh treatment after she is discharged from the hospital. Pt told this writer, \"You are wasting your time, I've tried [employer] before and I don't want to do it again.

## 2020-04-17 NOTE — PAYOR COMM NOTE
--------------  ADMISSION REVIEW     Payor: Arianne Laughlin  #:  [id number]  Authorization Number: [id number]    Admit date: 4/16/20  Admit time: 2331       Admitting Physician: Imer Melendez MD  Attending Physician:  Faisal Boothe denies abdominal pain. Denies urinary symptoms. Denies fevers or chills. Denies focal numbness tingling weakness.     Past Medical History:   Diagnosis Date   • Cardiomyopathy ([facility] [address] 11/13    ?etoh   • CORONARY ARTERY DISEASE    • Depression    • Heart at distress. HEENT: Pupils equal round reactive to light, extraocular muscles are intact, there is no scleral icterus. Mucous membranes are slightly dry. Scalp is atraumatic. NECK: Neck is supple, there is no nuchal rigidity. No carotid bruits.   No ana rosa Abnormality         Status                     ---------                               -----------         ------                     CBC W/ DIFFERENTIAL[[id number]]          Abnormal            Final result                 Please view results for t [facility])    Disposition:  Admit  4/16/2020 10:14 pm    Follow-up:  No follow-up provider specified.         Medications Prescribed:  Current Discharge Medication List                       Present on Admission  Date Reviewed: 11/15/2019          ICD-10-CM Note Intravenous Joelle Richards, RN      Sertraline HCl (ZOLOFT) tab 100 mg     Date Action Dose Route User    4/17/2020 1010 Given 100 mg Oral Sandra Abreu RN      sodium chloride 0.9% IV bolus 1,000 mL     Date Action Dose Route User    4/16/2020 2110 New below           ASSESSMENT/PLAN:  1. Seizures: ct head negative for acute process.  ?Related to etoh withdrawal vs noncompliance with anti-seizure meds   - neuro consulted  - management of EtOH withdrawal as below   -AEDs per neuro  2. etoh withdrawal:  Las supple neck; trachea midline. Lungs: Clear to auscultation bilaterally. No wheezes or crackles. Heart: Regular rate and rhythm, normal S1S2, no murmur.   Abdomen: Soft, non-tender, non-distended, no masses, no guarding, no rebound, positive BS  Extremity:

## 2020-04-17 NOTE — TELEPHONE ENCOUNTER
I was called by  Yulia Fermin via answering service, He states that again his wife has had nausea and vomiting today. She vomited after her last 500mg Tab Keppra.  He states that she has had some left gaze deviation but has not had a seizure and is still t

## 2020-04-17 NOTE — ED PROVIDER NOTES
Patient Seen in: [facility] Emergency Department      History   Patient presents with:  Seizure Disorder    Stated Complaint: seizure    HPI    59-year-old female with history of seizure disorder, alcohol abuse, previous stroke, coronary disease, al Laterality Date   • ANGIOGRAM  13    minimal CAD in the RCA   • ANGIOPLASTY (CORONARY)  12    LAD-thrombotic event   •       x1   • IR ANGIOGRAM CEREBRAL CAROTID BILATERAL  2018                         Social History    Tobacco Use rashes. NEUROLOGIC EXAM: Tongue is tremulous, extremities are tremulous.   Tongue midline, no facial drooping, no ptosis, muscle strength +5/5 bilateral upper and lower extremities , no pronator drift    ED Course     Labs Reviewed   COMP METABOLIC PANEL ( (exclusive of billable procedures) was administered to manage the patient's neurologic instability due to her alcohol withdrawal.  This involved direct patient intervention, complex decision making, and/or extensive discussions with the patient, family, an

## 2020-04-17 NOTE — CONSULTS
[facility]  Neuro Critical Care Consult Note    Darlene Dominguez Patient Status:  Inpatient    1965 MRN [id number]   [facility] 6NE-A Attending Nahum Nielson,*   Hosp Day # 1 PCP Myriam Moses DO     Date of Admission: 7/2018 without reaction     Social History:   reports that she quit smoking about 23 years ago. She has a 7.50 pack-year smoking history.  She has never used smokeless tobacco. She reports current alcohol use of about 3.0 standard drinks of alcohol per w famotidine (PEPCID) tab 10 mg, 10 mg, Oral, BID  •  lisinopril tab 5 mg, 5 mg, Oral, QPM  •  Metoprolol Succinate ER (Toprol XL) 24 hr tab 25 mg, 25 mg, Oral, QPM  •  Sertraline HCl (ZOLOFT) tab 100 mg, 100 mg, Oral, Daily  •  levETIRAcetam (KEPPRA) tab 1, MS:The patient is alert and orientated x 3. Speech fluent. Able to follow commands. CrN: PERRLA +3 brisk. EOMI. Face is symmetrical. Tongue midline. Uvula and palate elevate ymmetrically. Shoulder shrug normal bilaterally.  Sensation to light touch is i

## 2020-04-17 NOTE — ED INITIAL ASSESSMENT (HPI)
PT hx seizures, Pt rpt feeling \"aura\" right now and consulted w/ neurologist Dr. Ines Loaiza. PT c/o nausea and headache. PT took 1000mg Keppra at noon today, 500mg @ 1500 today and Pt received 1000mg at 1800 this evening.

## 2020-04-17 NOTE — PLAN OF CARE
Received patient from the CCU. Received patient a/ox4. VSS. CIWA 2 and less. Has not needed ativan this shift. Patient up to the bathroom and steady. Appetite good. Patient has not had any seizure activity this shift.   Dr. Lakshmi Mcintyre notified of consult

## 2020-04-17 NOTE — CONSULTS
[facility]:  [facility]    1965 MRN [id number]   Location [facility] Attending Jus Whalen, [address] [facility] Day # 0 PCP Jefe Frost,      Date of Admission: 2020  Admission Diagnos standard drinks of alcohol per week. She reports that she does not use drugs.       Family History:  Family History   Problem Relation Age of Onset   • Hypertension Father    • Neurological Disorder Father         Parkinsons   • Neurological Disorder Mother mL, Intravenous, Once  •  Infuvite Adult (INFUVITE) 10 mL, Thiamine HCl 847 mg, folic acid (FOLVITE) 1 mg in dextrose 5 % 1,000 mL infusion, , Intravenous, Daily     Review of Systems:  Constitutional: denies weight loss, fevers, chills, night sweats, or f regular rhythm                           Abdomen: soft, non-tender, non-distended, positive BS.                         Extremity: No clubbing or cyanosis. no edema                          Skin: No rashes or lesions.        Lab Results   Component Value D

## 2020-04-17 NOTE — PAYOR COMM NOTE
--------------  ADMISSION REVIEW     Payor: Arianne Laughlin  #:  [id number]  Authorization Number: [id number]    Admit date: 4/16/20  Admit time: 2331       Admitting Physician: Vernell Gamino MD  Attending Physician:  Saad Garza denies abdominal pain. Denies urinary symptoms. Denies fevers or chills. Denies focal numbness tingling weakness.     Past Medical History:   Diagnosis Date   • Cardiomyopathy ([facility] [address] 11/13    ?etoh   • CORONARY ARTERY DISEASE    • Depression    • Heart at distress. HEENT: Pupils equal round reactive to light, extraocular muscles are intact, there is no scleral icterus. Mucous membranes are slightly dry. Scalp is atraumatic. NECK: Neck is supple, there is no nuchal rigidity. No carotid bruits.   No ana rosa Abnormality         Status                     ---------                               -----------         ------                     CBC W/ DIFFERENTIAL[[id number]]          Abnormal            Final result                 Please view results for t [facility])    Disposition:  Admit  4/16/2020 10:14 pm    Follow-up:  No follow-up provider specified.         Medications Prescribed:  Current Discharge Medication List                       Present on Admission  Date Reviewed: 11/15/2019          ICD-10-CM Note disorder [facility])    • Stroke ([facility] [address] 04/2015   • Unspecified essential hypertension         Medications:  Outpatient Meds:  No current facility-administered medications on file prior to encounter.    SERTRALINE  MG Oral Tab, TAKE ONE TABLET BY MOUTH JOBY LORazepam, LORazepam    Allergies:    Penicillins             RASH    Comment:Once when a child. Got a dose as an adult with no             reaction.  Was on zosyn for UTI during admission             7/2018 without reaction     Past Social Hx:  Denies heav most consistent with chronic small vessel ischemic change within the deep white matter. Stable right-sided cerebral chronic infarcts. No significant change when compared to most recent noncontrast CT of the brain performed 3/12/2020.        Assessment/Hali Action Dose Route User    4/17/2020 1011 Given 1500 mg Oral Ryan Mazariegos RN      LORazepam (ATIVAN) injection 1 mg     Date Action Dose Route User    4/16/2020 2112 Given 1 mg Intravenous Osmel Lezama RN      LORazepam (ATIVAN) injection 1 mg     Date A

## 2020-04-18 NOTE — PROGRESS NOTES
[id number] Yulissa Ramirez Neurology Progress Note    Louann Jaqueline Patient Status:  Inpatient    1965 MRN [id number]   [facility] 3NE-A Attending Radha Norris,*   Hosp Day # 2 PCP Amisha Newell DO         Subjective:  Gallito Blinks 04/17/2020    PTT 35.3 04/17/2020    INR 1.58 04/17/2020    PTP 19.3 04/17/2020    MG 1.9 04/17/2020              Imaging/Diagnostic:    • apixaban  5 mg Oral BID   • atorvastatin  40 mg Oral Nightly   • famotidine  10 mg Oral BID   • lisinopril  5 mg Oral folate   Cont Eliquis      No reported seizures, keppra level pending, she reports she is feeling fine, wants to go home       Pancho Cutler, [address]  4/18/2020  11:29 AM  Spectra [id number]    Neurology Attending Addendum:  I have palate midline, SCM intact, otherwise CN 2-12 intact   Motor: 5/5 strength throughout, tone normal  Sensory: intact to light touch throughout   Coord: FNF intact with no tremor or dysmetria  Romberg: deferred  Gait: deferred    Imaging: no new imaging; aashish Value Date    ALT 49 04/17/2020    ALT 54 04/17/2020    ALT 70 (H) 04/16/2020          Assessment/Plan:   Seizure disorder  EtOH abuse  Cardiomyopathy:     Patient improved, no siezures, Keppra level pending and question of compliance with medication - she

## 2020-04-18 NOTE — PROGRESS NOTES
[facility] Hospitalist Progress Note                                                                   [address]  5/5/1965    CC: FU aura, hx of siezures    Interval History:  - Feels well --  145*  --    BUN 9  --  4*  --    CREATSERUM 0.90  --  0.73  --    GFRAA 84  --  109  --    GFRNAA 73  --  94  --    CA 9.3  --  8.1*  --    * 135* 133*  --    K 4.1 3.3* 4.0 3.7    105 105  --    CO2 20.0* 24.0 25.0  --            ROS: no c

## 2020-04-18 NOTE — PLAN OF CARE
Pt. A&O x4, on RA, tele shows NSR. Up ad jai. VSS. No c/o n/v or pain. CIWAs scored 0, d/c'd at 2200. Seizure and safety precautions in place. Will continue to monitor.     Problem: Patient/Family Goals  Goal: Patient/Family Long Term Goal  Description  Brigitte Munguia

## 2020-04-18 NOTE — PLAN OF CARE
Patient is A&Ox4. Vision loss in L eye from previous CVA. Follows commands. Seizure precautions, no seizure activity observed or reported. No s/s of alcohol withdrawal reported or observed. Denies pain. NSR on telemetry, VSS, afebrile.   Saturating 9

## 2020-04-20 NOTE — PAYOR COMM NOTE
--------------  DISCHARGE REVIEW    Payor: Arianne Laughlin  #:  [id number]  Authorization Number: [id number]    Admit date: 4/16/20  Admit time:  2331  Discharge Date: 4/18/2020  2:12 PM     Admitting Physician: Dave Yancey MD  Attend RBC 3.71* 3.28*   HGB 13.6 11.8*   HCT 38.9 34.7*   .9* 105.8*   MCH 36.7* 36.0*   MCHC 35.0 34.0   RDW 11.9 11.7   NEPRELIM 4.75 2.99   WBC 7.4 5.1   .0* 76.0*                Recent Labs   Lab 04/16/20  2103 04/17/20  0424 04/17/20  1307 0

## 2020-04-21 NOTE — PROGRESS NOTES
2nd attempt   Unidentified person answered and hung up     Future Appointments  4/21/2020  65:27 AM   Sari Stoddard DO         [address]

## 2020-04-24 DIAGNOSIS — I63.50 CEREBRAL ARTERY OCCLUSION WITH CEREBRAL INFARCTION (HCC): ICD-10-CM

## 2020-04-24 NOTE — TELEPHONE ENCOUNTER
Pt overdue for appt. [employer] message sent.      Medication: Atorvastatin     Date of last refill: 3/27/2020 for #30/0 additional refill   [place]    Last office visit: 10/19/19  Due back to clinic per last office note

## 2020-05-06 DIAGNOSIS — I63.50 CEREBRAL ARTERY OCCLUSION WITH CEREBRAL INFARCTION (HCC): ICD-10-CM

## 2020-05-06 NOTE — TELEPHONE ENCOUNTER
Pt needs appt. [employer] message sent.      Medication: atorvastatin 40 mg     Date of last refill: 3/27/20 (#30/0)  [place]    Last office visit: 10/14/19  Due back to clinic per last office note:  2 months  Date next o

## 2020-05-22 DIAGNOSIS — G40.909 SEIZURE DISORDER (HCC): Primary | ICD-10-CM

## 2020-05-22 NOTE — TELEPHONE ENCOUNTER
Spoke with , Jaziel Baird (ok per HIPAA) that appt needed. Accepted telemedicine visit 5/26/2020 at 9:40. Explained protocol and financial responsibility with telemedicine visits. Verbalized understanding.      Medication: Vimpat     Date of last refill: 4

## 2020-05-26 ENCOUNTER — VIRTUAL PHONE E/M (OUTPATIENT)
Dept: NEUROLOGY | Facility: CLINIC | Age: 55
End: 2020-05-26
Payer: COMMERCIAL

## 2020-05-26 DIAGNOSIS — G40.909 SEIZURE DISORDER (HCC): Primary | ICD-10-CM

## 2020-05-26 DIAGNOSIS — I63.50 CEREBRAL ARTERY OCCLUSION WITH CEREBRAL INFARCTION (HCC): ICD-10-CM

## 2020-05-26 PROCEDURE — 99213 OFFICE O/P EST LOW 20 MIN: CPT | Performed by: OTHER

## 2020-05-26 RX ORDER — ATORVASTATIN CALCIUM 40 MG/1
40 TABLET, FILM COATED ORAL NIGHTLY
Qty: 90 TABLET | Refills: 1 | Status: SHIPPED | OUTPATIENT
Start: 2020-05-26 | End: 2020-11-30

## 2020-05-26 RX ORDER — LEVETIRACETAM 1000 MG/1
1000 TABLET ORAL 2 TIMES DAILY
Qty: 180 TABLET | Refills: 1 | Status: SHIPPED | OUTPATIENT
Start: 2020-05-26 | End: 2020-12-07

## 2020-05-26 NOTE — PROGRESS NOTES
Please note that the following visit was completed using two-way, real-time interactive audio and/or video communication.   This has been done in good ronan to provide continuity of care in the best interest of the provider-patient relationship, due to the BY MOUTH EVERY NIGHT, Disp: 30 tablet, Rfl: 0  •  levetiracetam 1000 MG Oral Tab, Take 1 tablet (1,000 mg total) by mouth 2 (two) times daily.  Take one tablet with 500 mg tab for 1500 mg total twice daily, Disp: 180 tablet, Rfl: 0  •  levETIRAcetam 500 MG

## 2020-05-29 RX ORDER — ATORVASTATIN CALCIUM 40 MG/1
TABLET, FILM COATED ORAL
Qty: 30 TABLET | Refills: 0 | OUTPATIENT
Start: 2020-05-29

## 2020-05-29 RX ORDER — ATORVASTATIN CALCIUM 40 MG/1
40 TABLET, FILM COATED ORAL NIGHTLY
Qty: 30 TABLET | Refills: 0 | OUTPATIENT
Start: 2020-05-29

## 2020-06-12 DIAGNOSIS — G40.909 SEIZURE DISORDER (HCC): ICD-10-CM

## 2020-06-12 RX ORDER — LEVETIRACETAM 500 MG/1
TABLET ORAL
Qty: 180 TABLET | Refills: 0 | Status: SHIPPED | OUTPATIENT
Start: 2020-06-12 | End: 2020-09-08

## 2020-06-12 NOTE — TELEPHONE ENCOUNTER
Medication: Levetiracetam 500mg    Date of last refill: 3/16/20 (#180/0)  [place]     Last office visit:5/26/20  Due back to clinic per last office note:  3 months  Date next office visit scheduled:  No future appointmen

## 2020-08-07 ENCOUNTER — HOSPITAL ENCOUNTER (EMERGENCY)
Facility: HOSPITAL | Age: 55
Discharge: HOME | End: 2020-08-07
Attending: EMERGENCY MEDICINE
Payer: COMMERCIAL

## 2020-08-07 VITALS
RESPIRATION RATE: 16 BRPM | HEART RATE: 71 BPM | OXYGEN SATURATION: 95 % | TEMPERATURE: 99 F | HEIGHT: 62 IN | DIASTOLIC BLOOD PRESSURE: 77 MMHG | BODY MASS INDEX: 21.35 KG/M2 | SYSTOLIC BLOOD PRESSURE: 177 MMHG | WEIGHT: 116 LBS

## 2020-08-07 DIAGNOSIS — G40.909 SEIZURE DISORDER (HCC): Primary | ICD-10-CM

## 2020-08-07 LAB
ALBUMIN SERPL-MCNC: 3.8 G/DL (ref 3.4–5)
ALBUMIN/GLOB SERPL: 0.8 {RATIO} (ref 1–2)
ALP LIVER SERPL-CCNC: 125 U/L (ref 41–108)
ALT SERPL-CCNC: 103 U/L (ref 13–56)
ANION GAP SERPL CALC-SCNC: 4 MMOL/L (ref 0–18)
AST SERPL-CCNC: 323 U/L (ref 15–37)
BASOPHILS # BLD AUTO: 0.05 X10(3) UL (ref 0–0.2)
BASOPHILS NFR BLD AUTO: 1.1 %
BILIRUB SERPL-MCNC: 1 MG/DL (ref 0.1–2)
BUN BLD-MCNC: 9 MG/DL (ref 7–18)
BUN/CREAT SERPL: 11.3 (ref 10–20)
CALCIUM BLD-MCNC: 9.2 MG/DL (ref 8.5–10.1)
CHLORIDE SERPL-SCNC: 105 MMOL/L (ref 98–112)
CO2 SERPL-SCNC: 27 MMOL/L (ref 21–32)
CREAT BLD-MCNC: 0.8 MG/DL (ref 0.55–1.02)
DEPRECATED RDW RBC AUTO: 46.7 FL (ref 35.1–46.3)
EOSINOPHIL # BLD AUTO: 0.05 X10(3) UL (ref 0–0.7)
EOSINOPHIL NFR BLD AUTO: 1.1 %
ERYTHROCYTE [DISTWIDTH] IN BLOOD BY AUTOMATED COUNT: 11.9 % (ref 11–15)
ETHANOL SERPL-MCNC: 15 MG/DL (ref ?–3)
GLOBULIN PLAS-MCNC: 4.8 G/DL (ref 2.8–4.4)
GLUCOSE BLD-MCNC: 119 MG/DL (ref 70–99)
HAV IGM SER QL: 1.4 MG/DL (ref 1.6–2.6)
HCT VFR BLD AUTO: 36.1 % (ref 35–48)
HGB BLD-MCNC: 12.6 G/DL (ref 12–16)
IMM GRANULOCYTES # BLD AUTO: 0.01 X10(3) UL (ref 0–1)
IMM GRANULOCYTES NFR BLD: 0.2 %
LYMPHOCYTES # BLD AUTO: 1.93 X10(3) UL (ref 1–4)
LYMPHOCYTES NFR BLD AUTO: 40.7 %
M PROTEIN MFR SERPL ELPH: 8.6 G/DL (ref 6.4–8.2)
MCH RBC QN AUTO: 37.5 PG (ref 26–34)
MCHC RBC AUTO-ENTMCNC: 34.9 G/DL (ref 31–37)
MCV RBC AUTO: 107.4 FL (ref 80–100)
MONOCYTES # BLD AUTO: 0.3 X10(3) UL (ref 0.1–1)
MONOCYTES NFR BLD AUTO: 6.3 %
NEUTROPHILS # BLD AUTO: 2.4 X10 (3) UL (ref 1.5–7.7)
NEUTROPHILS # BLD AUTO: 2.4 X10(3) UL (ref 1.5–7.7)
NEUTROPHILS NFR BLD AUTO: 50.6 %
OSMOLALITY SERPL CALC.SUM OF ELEC: 282 MOSM/KG (ref 275–295)
PLATELET # BLD AUTO: 97 10(3)UL (ref 150–450)
POTASSIUM SERPL-SCNC: 3.9 MMOL/L (ref 3.5–5.1)
RBC # BLD AUTO: 3.36 X10(6)UL (ref 3.8–5.3)
SODIUM SERPL-SCNC: 136 MMOL/L (ref 136–145)
WBC # BLD AUTO: 4.7 X10(3) UL (ref 4–11)

## 2020-08-07 PROCEDURE — 99284 EMERGENCY DEPT VISIT MOD MDM: CPT

## 2020-08-07 PROCEDURE — 96374 THER/PROPH/DIAG INJ IV PUSH: CPT

## 2020-08-07 PROCEDURE — 96375 TX/PRO/DX INJ NEW DRUG ADDON: CPT

## 2020-08-07 PROCEDURE — 96361 HYDRATE IV INFUSION ADD-ON: CPT

## 2020-08-07 PROCEDURE — 83735 ASSAY OF MAGNESIUM: CPT | Performed by: EMERGENCY MEDICINE

## 2020-08-07 PROCEDURE — 85025 COMPLETE CBC W/AUTO DIFF WBC: CPT | Performed by: EMERGENCY MEDICINE

## 2020-08-07 PROCEDURE — 99285 EMERGENCY DEPT VISIT HI MDM: CPT

## 2020-08-07 PROCEDURE — 80320 DRUG SCREEN QUANTALCOHOLS: CPT | Performed by: EMERGENCY MEDICINE

## 2020-08-07 PROCEDURE — 80053 COMPREHEN METABOLIC PANEL: CPT | Performed by: EMERGENCY MEDICINE

## 2020-08-07 RX ORDER — ONDANSETRON 2 MG/ML
4 INJECTION INTRAMUSCULAR; INTRAVENOUS ONCE
Status: COMPLETED | OUTPATIENT
Start: 2020-08-07 | End: 2020-08-07

## 2020-08-07 RX ORDER — LORAZEPAM 2 MG/ML
2 INJECTION INTRAMUSCULAR ONCE
Status: COMPLETED | OUTPATIENT
Start: 2020-08-07 | End: 2020-08-07

## 2020-08-07 RX ORDER — MAGNESIUM OXIDE 400 MG (241.3 MG MAGNESIUM) TABLET
800 TABLET ONCE
Status: COMPLETED | OUTPATIENT
Start: 2020-08-07 | End: 2020-08-07

## 2020-08-07 NOTE — ED INITIAL ASSESSMENT (HPI)
Feels impending seizure. States \"I get a metallic taste in my mouth and nauseated when im about to get a seizure. It hasn't hit yet but I can feel one coming on. \" Last seizure April 16

## 2020-08-07 NOTE — ED NOTES
Pt c/o pain at [facility] saline lock.  No swelling at site, removed at pt request

## 2020-08-07 NOTE — ED NOTES
Seizure precautions initiated, blankets applied to side rails for padding, room lights off for reduced stimuli. Call light w/i reach. Family at bedside, aware to notify RN if pt status changes.

## 2020-08-07 NOTE — ED PROVIDER NOTES
Patient Seen in: [facility] Emergency Department      History   Patient presents with:  Seizure Disorder    Stated Complaint: feels impending seizure - metallic taste in mouth, dizziness    HPI    46-year-old with past medical history of cardiomyopa Alcohol/week: 3.0 standard drinks      Types: 3 Cans of beer per week      Frequency: 4 or more times a week      Binge frequency: Daily or almost daily      Comment: 3 cans beer/day    Drug use:  No             Review of Systems    Positive for stated comp Notable for the following components:       Result Value    Glucose 119 (*)      (*)      (*)     Alkaline Phosphatase 125 (*)     Total Protein 8.6 (*)     Globulin  4.8 (*)     A/G Ratio 0.8 (*)     All other components within normal limits alcohol withdrawal.  In discussion of disposition, I did offer patient and  observation admission. They both would prefer discharge home.   I did review case with Dr. Arsen Lobo, on-call for patient's neurologist.  He felt it not unreasonable to disch

## 2020-08-17 ENCOUNTER — TELEPHONE (OUTPATIENT)
Dept: NEUROLOGY | Facility: CLINIC | Age: 55
End: 2020-08-17

## 2020-08-17 NOTE — TELEPHONE ENCOUNTER
Rec'd incoming fax from [facility] dated 8/17/20 with APPROVAL for Vimpat. Effective from 8/17/20 through 8/17/21.     Reference #: [id number]    Spoke with pharmacy and notified them approval.  They indicated that they were able to run the Rx a

## 2020-08-17 NOTE — TELEPHONE ENCOUNTER
Per Epic review, patient has been taking Vimpat since April. Last order on 5/26/20 stated no PA required for medication (through [employer]). Called pharmacy to discuss.   They are receiving a message on their end that PA is required, but not further i

## 2020-09-06 DIAGNOSIS — G40.909 SEIZURE DISORDER (HCC): ICD-10-CM

## 2020-09-08 RX ORDER — LEVETIRACETAM 500 MG/1
TABLET ORAL
Qty: 180 TABLET | Refills: 0 | Status: SHIPPED | OUTPATIENT
Start: 2020-09-08 | End: 2020-12-10

## 2020-09-08 NOTE — TELEPHONE ENCOUNTER
Medication: LEVETIRACETAM 500 MG     Date of last refill: 6/12/20 (#180/0)  [place]    Last office visit: 5/26/20  Due back to clinic per last office note:  3 months  Date next office visit scheduled:    Future Appoin

## 2020-09-10 PROBLEM — C43.59 MALIGNANT MELANOMA OF UPPER BACK (HCC): Status: ACTIVE | Noted: 2020-09-10

## 2020-11-06 DIAGNOSIS — I63.50 CEREBRAL ARTERY OCCLUSION WITH CEREBRAL INFARCTION (HCC): ICD-10-CM

## 2020-11-06 NOTE — TELEPHONE ENCOUNTER
Needs appt    Medication: ATORVASTATIN 40 MG Oral Tab    Date of last refill: 5/26/2020 (#90/1)  [place]    Last office visit: 5/26/2020  Due back to clinic per last office note:  3 months  Date next office visit michelle

## 2020-11-19 ENCOUNTER — TELEPHONE (OUTPATIENT)
Dept: NEUROLOGY | Facility: CLINIC | Age: 55
End: 2020-11-19

## 2020-11-19 NOTE — TELEPHONE ENCOUNTER
Received fax from pharmacy requesting 90 day supply. Informed them that patient needs appointment for any further refills.

## 2020-11-27 NOTE — TELEPHONE ENCOUNTER
Per Epic review, patient reviewed [employer] message. F/up appt was not made. Spoke with patient who was agreeable to scheduling appt at this time. Transferred to [facility]. Appointment scheduled for Monday Nov 30. Will hold Rx until office visit.

## 2020-11-30 ENCOUNTER — OFFICE VISIT (OUTPATIENT)
Dept: NEUROLOGY | Facility: CLINIC | Age: 55
End: 2020-11-30
Payer: COMMERCIAL

## 2020-11-30 VITALS
WEIGHT: 116 LBS | DIASTOLIC BLOOD PRESSURE: 70 MMHG | HEART RATE: 68 BPM | SYSTOLIC BLOOD PRESSURE: 110 MMHG | BODY MASS INDEX: 21 KG/M2 | RESPIRATION RATE: 12 BRPM

## 2020-11-30 DIAGNOSIS — I63.50 CEREBRAL ARTERY OCCLUSION WITH CEREBRAL INFARCTION (HCC): ICD-10-CM

## 2020-11-30 DIAGNOSIS — G40.909 SEIZURE DISORDER (HCC): Primary | ICD-10-CM

## 2020-11-30 DIAGNOSIS — I69.30 HISTORY OF STROKE WITH RESIDUAL DEFICIT: ICD-10-CM

## 2020-11-30 PROCEDURE — 3074F SYST BP LT 130 MM HG: CPT | Performed by: OTHER

## 2020-11-30 PROCEDURE — 3078F DIAST BP <80 MM HG: CPT | Performed by: OTHER

## 2020-11-30 PROCEDURE — 99213 OFFICE O/P EST LOW 20 MIN: CPT | Performed by: OTHER

## 2020-11-30 RX ORDER — ATORVASTATIN CALCIUM 40 MG/1
40 TABLET, FILM COATED ORAL NIGHTLY
Qty: 90 TABLET | Refills: 1 | Status: SHIPPED | OUTPATIENT
Start: 2020-11-30 | End: 2021-06-15

## 2020-11-30 RX ORDER — ATORVASTATIN CALCIUM 40 MG/1
TABLET, FILM COATED ORAL
Qty: 90 TABLET | Refills: 0 | OUTPATIENT
Start: 2020-11-30

## 2020-11-30 NOTE — PROGRESS NOTES
Neurology H&P    [place] Patient Status:  No patient class for patient encounter    1965 MRN [id number]   Location [address] [place] [address], [address] [place] [address] Drive, [address] [facility] Attending No att. providers found   Hosp Day # 0 PCP Julissa Escobedo DO by mouth 2 (two) times daily. Take one tablet with 500 mg tab for 1500 mg total twice daily 180 tablet 1   • atorvastatin 40 MG Oral Tab Take 1 tablet (40 mg total) by mouth nightly.  90 tablet 1   • lacosamide (VIMPAT) 50 MG Oral Tab Take 1 tablet (50 mg t • Depression    • Heart attack ([facility] [address] 05/21/2012   • High blood pressure    • History of blood clots     legs; heart; head   • History of ETOH abuse    • Pneumonia, organism unspecified(486)     pt denies having this 10/29/19   • Psychiatric disorder    • midline, shrug is intact     MSK:        RUE: Delt: 5/5, Bi: 5/5, Tri: 5/5, : 5/5       LUE: Delt: 5/5, Bi: 5/5, Tri: 5/5, : 5/5       RLE: HF: 5/5, HE: 5/5, KF: 5/5, KE: 5/5, DF: 5/5, PF: 5/5       LLE: HF: 5/5, HE: 5/5, KF: 5/5, KE: 5/5, DF: 5/5, supplies perforators, compatible with   a fetal origin of the right PCA.   The capillary and venous phases are normal.     Right internal carotid angiography:  The distal cervical and intracranial segments of the artery are normal.  The anterior and middle temporal lobe, right occipital lobe, posterior limb of the right internal capsule, and right thalamus. This does not appear significantly   changed in extent when compared to the CT from 7/17/2018.   There are focal areas of gradient susceptibility scatter

## 2020-12-05 DIAGNOSIS — G40.909 SEIZURE DISORDER (HCC): ICD-10-CM

## 2020-12-07 RX ORDER — LEVETIRACETAM 1000 MG/1
TABLET ORAL
Qty: 180 TABLET | Refills: 0 | Status: SHIPPED | OUTPATIENT
Start: 2020-12-07 | End: 2021-03-15

## 2020-12-07 NOTE — TELEPHONE ENCOUNTER
Medication: LEVETIRACETAM 1000 MG Oral Tab    Date of last refill: 05/26/2020 (#180/1)  [place]    Last office visit: 11/30/2020  Due back to clinic per last office note:  Around 05/30/2021  Date next office visit sc

## 2020-12-10 DIAGNOSIS — G40.909 SEIZURE DISORDER (HCC): ICD-10-CM

## 2020-12-10 RX ORDER — LEVETIRACETAM 500 MG/1
TABLET ORAL
Qty: 180 TABLET | Refills: 0 | Status: SHIPPED | OUTPATIENT
Start: 2020-12-10 | End: 2021-03-15

## 2020-12-10 NOTE — TELEPHONE ENCOUNTER
Medication: Levetiracetam 500mg    Date of last refill: 9/8/20 (#180/0)  [place]     Last office visit: 11/30/20  Due back to clinic per last office note:  6 months  Date next office visit scheduled:    Future Appointmen

## 2020-12-15 RX ORDER — LACOSAMIDE 50 MG
TABLET ORAL
Qty: 180 TABLET | Refills: 0 | Status: SHIPPED | OUTPATIENT
Start: 2020-12-15 | End: 2021-03-15

## 2020-12-15 NOTE — TELEPHONE ENCOUNTER
Medication: VIMPAT 50 MG Oral Tab    Date of last refill: 5/26/2020 (#180/1)  [place]a    Last office visit: 11/30/2020  Due back to clinic per last office note:  6 months  Date next office visit scheduled:    Future

## 2020-12-21 ENCOUNTER — TELEPHONE (OUTPATIENT)
Dept: NEUROLOGY | Facility: CLINIC | Age: 55
End: 2020-12-21

## 2020-12-21 DIAGNOSIS — R56.9 SEIZURE (HCC): Primary | ICD-10-CM

## 2020-12-22 NOTE — TELEPHONE ENCOUNTER
Received notification from [facility] that patient needs PA renewal.    Key Code [id number] started. Attempted to complete on CMM. Faxed answers to [phone]. Awaiting response.

## 2020-12-29 NOTE — TELEPHONE ENCOUNTER
Rec'd incoming letter from [facility] dated 12/22/20 with APPROVAL for Vimpat. Effective from 1/1/21 through 12/22/21.     Reference #: [id number]

## 2021-03-13 DIAGNOSIS — G40.909 SEIZURE DISORDER (HCC): ICD-10-CM

## 2021-03-14 DIAGNOSIS — R56.9 SEIZURE (HCC): Primary | ICD-10-CM

## 2021-03-15 RX ORDER — LEVETIRACETAM 1000 MG/1
TABLET ORAL
Qty: 180 TABLET | Refills: 0 | Status: SHIPPED | OUTPATIENT
Start: 2021-03-15 | End: 2021-05-24 | Stop reason: DRUGHIGH

## 2021-03-15 RX ORDER — LEVETIRACETAM 500 MG/1
TABLET ORAL
Qty: 180 TABLET | Refills: 0 | Status: SHIPPED | OUTPATIENT
Start: 2021-03-15 | End: 2021-05-24 | Stop reason: ALTCHOICE

## 2021-03-15 RX ORDER — LACOSAMIDE 50 MG
TABLET ORAL
Qty: 180 TABLET | Refills: 0 | Status: SHIPPED | OUTPATIENT
Start: 2021-03-15 | End: 2021-06-14

## 2021-03-15 NOTE — TELEPHONE ENCOUNTER
Medication: LEVETIRACETAM 1000 MG , Levetiracetam 500mg    Date of last refill: 12/7/20 (#180/0); 12/10/20 (#180/0)  [place]     Last office visit: 11/30/2020  Due back to clinic per last office note:  6 months  Date nex

## 2021-03-15 NOTE — TELEPHONE ENCOUNTER
Medication: VIMPAT 50 MG     Date of last refill: 12/15/20 (#180/0)  [place]     Last office visit: 11/30/2020  Due back to clinic per last office note:  6 months  Date next office visit scheduled:    Future Appointments

## 2021-04-01 ENCOUNTER — IMMUNIZATION (OUTPATIENT)
Dept: LAB | Age: 56
End: 2021-04-01
Attending: HOSPITALIST
Payer: COMMERCIAL

## 2021-04-01 DIAGNOSIS — Z23 NEED FOR VACCINATION: Primary | ICD-10-CM

## 2021-04-01 PROCEDURE — 0001A SARSCOV2 VAC 30MCG/0.3ML IM: CPT

## 2021-04-22 ENCOUNTER — IMMUNIZATION (OUTPATIENT)
Dept: LAB | Age: 56
End: 2021-04-22
Attending: HOSPITALIST
Payer: COMMERCIAL

## 2021-04-22 DIAGNOSIS — Z23 NEED FOR VACCINATION: Primary | ICD-10-CM

## 2021-04-22 PROCEDURE — 0002A SARSCOV2 VAC 30MCG/0.3ML IM: CPT

## 2021-05-18 DIAGNOSIS — R56.9 SEIZURE (HCC): Primary | ICD-10-CM

## 2021-05-18 RX ORDER — LEVETIRACETAM 250 MG/1
TABLET ORAL
Qty: 360 TABLET | Refills: 0 | Status: SHIPPED | OUTPATIENT
Start: 2021-05-18 | End: 2021-05-24

## 2021-05-18 NOTE — TELEPHONE ENCOUNTER
Patient requests 250mg capsules as they are easier to swallow.      Medication: Levetiracetam    Date of last refill: 5/15/21 (#180/0)  [place]     Last office visit: 11/30/2020  Due back to clinic per last office note:

## 2021-05-24 ENCOUNTER — OFFICE VISIT (OUTPATIENT)
Dept: NEUROLOGY | Facility: CLINIC | Age: 56
End: 2021-05-24
Payer: COMMERCIAL

## 2021-05-24 VITALS
SYSTOLIC BLOOD PRESSURE: 82 MMHG | RESPIRATION RATE: 16 BRPM | DIASTOLIC BLOOD PRESSURE: 58 MMHG | BODY MASS INDEX: 20 KG/M2 | HEART RATE: 60 BPM | WEIGHT: 106.63 LBS

## 2021-05-24 DIAGNOSIS — G40.909 SEIZURE DISORDER (HCC): Primary | ICD-10-CM

## 2021-05-24 DIAGNOSIS — R26.89 BALANCE PROBLEM: ICD-10-CM

## 2021-05-24 PROCEDURE — 3078F DIAST BP <80 MM HG: CPT | Performed by: OTHER

## 2021-05-24 PROCEDURE — 99213 OFFICE O/P EST LOW 20 MIN: CPT | Performed by: OTHER

## 2021-05-24 PROCEDURE — 3074F SYST BP LT 130 MM HG: CPT | Performed by: OTHER

## 2021-05-24 RX ORDER — LEVETIRACETAM 750 MG/1
1500 TABLET ORAL 2 TIMES DAILY
Qty: 120 TABLET | Refills: 5 | Status: SHIPPED | OUTPATIENT
Start: 2021-05-24 | End: 2021-10-25

## 2021-05-24 NOTE — PROGRESS NOTES
Neurology H&P    Mark Anderson Patient Status:  No patient class for patient encounter    1965 MRN [id number]   Location [address] [place] [address], [address] [place] [address] Drive, [address] [place] [address] Attending No att. providers found   Hosp Day # 0 PCP Taj Francis DO 60 tablet 11   • Multiple Vitamin (TAB-A-SUDARSHAN) Oral Tab Take 1 tablet by mouth daily.          Problem List:  Patient Active Problem List:     History of MI (myocardial infarction)     Card[facility]     Abdominal pain     Alcoholism ([facility]     Chest p the RCA   • ANGIOPLASTY (CORONARY)  12    LAD-thrombotic event   •       x1   • IR ANGIOGRAM CEREBRAL CAROTID BILATERAL  2018        • SKIN SURGERY  2020    melanoma removal       SocHx:  Social History    Tobacco Use      Smoking status 2+ biceps, 2+ brachioradialis       LE: 2+ patellae    Gait: normal gait    Coordination:       FTN: with BUE intention tremor and mild positional tremor            Labs:       Imaging:  [facility] 7/16/18    CONCLUSION:       1.   No acute intracranial hemorrhage angiographically unremarkable. The right PCA is fetal.  What was demonstrated to be a more proximal occlusion on the CT angiogram now demonstrates a more distal occlusion at   the distal P3 segment beyond some of the temporal branches of the AA PCA.   On t diffusion in the left parietal lobe best seen on image 21 series 3 compatible with an area of acute ischemia/infarction.      3. Punctate foci of gradient susceptibility noted in the posterior lateral right temporal lobe, mid left temporal lobe, and left fr

## 2021-06-12 DIAGNOSIS — G40.909 SEIZURE DISORDER (HCC): ICD-10-CM

## 2021-06-12 DIAGNOSIS — I63.50 CEREBRAL ARTERY OCCLUSION WITH CEREBRAL INFARCTION (HCC): ICD-10-CM

## 2021-06-13 DIAGNOSIS — R56.9 SEIZURE (HCC): ICD-10-CM

## 2021-06-14 RX ORDER — LACOSAMIDE 50 MG
TABLET ORAL
Qty: 180 TABLET | Refills: 0 | Status: ON HOLD | OUTPATIENT
Start: 2021-06-14 | End: 2021-09-04

## 2021-06-14 NOTE — TELEPHONE ENCOUNTER
Medication: VIMPAT 50 MG     Date of last refill: 3/15/21 (#180/0)  [place]     Last office visit: 5/24/2021  Due back to clinic per last office note:  6 months  Date next office visit scheduled:    Future Appointments

## 2021-06-15 RX ORDER — LEVETIRACETAM 1000 MG/1
TABLET ORAL
Qty: 180 TABLET | Refills: 0 | OUTPATIENT
Start: 2021-06-15

## 2021-06-15 RX ORDER — LEVETIRACETAM 500 MG/1
TABLET ORAL
Qty: 180 TABLET | Refills: 0 | OUTPATIENT
Start: 2021-06-15

## 2021-06-15 RX ORDER — ATORVASTATIN CALCIUM 40 MG/1
TABLET, FILM COATED ORAL
Qty: 90 TABLET | Refills: 0 | Status: ON HOLD | OUTPATIENT
Start: 2021-06-15 | End: 2021-09-04

## 2021-06-15 NOTE — TELEPHONE ENCOUNTER
Medications: Levetiracetam & Atorvastatin    Levetiracetam Refill requested for 1000 mg and 500 mg tablets. Refill for 750 mg x2 BID filled on 5/24/2021. Keppra DENIED.     Atorvastatin: 11/30/2020 #90/1    Last office visit: 5/24/2021  Due back to clinic

## 2021-06-15 NOTE — TELEPHONE ENCOUNTER
Received PA request for Vimpat which is authorized through 12/22/21.  Spoke with Annetta Emanuel at [facility] who states disregard request.

## 2021-08-18 ENCOUNTER — HOSPITAL ENCOUNTER (INPATIENT)
Facility: HOSPITAL | Age: 56
LOS: 20 days | Discharge: NURSING, REHAB OR LONG-TERM CARE | DRG: 101 | End: 2021-09-07
Attending: EMERGENCY MEDICINE | Admitting: HOSPITALIST
Payer: COMMERCIAL

## 2021-08-18 ENCOUNTER — APPOINTMENT (OUTPATIENT)
Dept: CT IMAGING | Facility: HOSPITAL | Age: 56
DRG: 101 | End: 2021-08-18
Attending: EMERGENCY MEDICINE
Payer: COMMERCIAL

## 2021-08-18 ENCOUNTER — NURSE ONLY (OUTPATIENT)
Dept: ELECTROPHYSIOLOGY | Facility: HOSPITAL | Age: 56
DRG: 101 | End: 2021-08-18
Attending: Other
Payer: COMMERCIAL

## 2021-08-18 ENCOUNTER — APPOINTMENT (OUTPATIENT)
Dept: GENERAL RADIOLOGY | Facility: HOSPITAL | Age: 56
DRG: 101 | End: 2021-08-18
Attending: Other
Payer: COMMERCIAL

## 2021-08-18 DIAGNOSIS — G40.901 STATUS EPILEPTICUS (HCC): Primary | ICD-10-CM

## 2021-08-18 DIAGNOSIS — R56.9 SEIZURE (HCC): ICD-10-CM

## 2021-08-18 DIAGNOSIS — I63.50 CEREBRAL ARTERY OCCLUSION WITH CEREBRAL INFARCTION (HCC): ICD-10-CM

## 2021-08-18 DIAGNOSIS — G40.909 SEIZURE DISORDER (HCC): ICD-10-CM

## 2021-08-18 PROBLEM — C43.9 MALIGNANT MELANOMA (HCC): Status: ACTIVE | Noted: 2020-09-10

## 2021-08-18 PROBLEM — F10.10 ETOH ABUSE: Status: ACTIVE | Noted: 2019-10-29

## 2021-08-18 LAB
ALBUMIN SERPL-MCNC: 2.5 G/DL (ref 3.4–5)
ALBUMIN/GLOB SERPL: 0.6 {RATIO} (ref 1–2)
ALP LIVER SERPL-CCNC: 163 U/L
ALT SERPL-CCNC: 117 U/L
AMPHET UR QL SCN: NEGATIVE
ANION GAP SERPL CALC-SCNC: 5 MMOL/L (ref 0–18)
APTT PPP: 29.2 SECONDS (ref 25.4–36.1)
AST SERPL-CCNC: 118 U/L (ref 15–37)
ATRIAL RATE: 77 BPM
BASOPHILS # BLD AUTO: 0.02 X10(3) UL (ref 0–0.2)
BASOPHILS NFR BLD AUTO: 0.2 %
BILIRUB SERPL-MCNC: 0.9 MG/DL (ref 0.1–2)
BILIRUB UR QL STRIP.AUTO: NEGATIVE
BUN BLD-MCNC: 8 MG/DL (ref 7–18)
CALCIUM BLD-MCNC: 8.8 MG/DL (ref 8.5–10.1)
CANNABINOIDS UR QL SCN: NEGATIVE
CHLORIDE SERPL-SCNC: 108 MMOL/L (ref 98–112)
CK SERPL-CCNC: 34 U/L
CO2 SERPL-SCNC: 28 MMOL/L (ref 21–32)
COCAINE UR QL: NEGATIVE
COLOR UR AUTO: YELLOW
CREAT BLD-MCNC: 0.69 MG/DL
CREAT UR-SCNC: 71.1 MG/DL
EOSINOPHIL # BLD AUTO: 0.01 X10(3) UL (ref 0–0.7)
EOSINOPHIL NFR BLD AUTO: 0.1 %
ERYTHROCYTE [DISTWIDTH] IN BLOOD BY AUTOMATED COUNT: 14.7 %
ETHANOL SERPL-MCNC: <3 MG/DL (ref ?–3)
GLOBULIN PLAS-MCNC: 4.4 G/DL (ref 2.8–4.4)
GLUCOSE BLD-MCNC: 141 MG/DL (ref 70–99)
GLUCOSE BLD-MCNC: 82 MG/DL (ref 70–99)
GLUCOSE BLD-MCNC: 92 MG/DL (ref 70–99)
GLUCOSE UR STRIP.AUTO-MCNC: NEGATIVE MG/DL
HCT VFR BLD AUTO: 36.5 %
HGB BLD-MCNC: 12.1 G/DL
IMM GRANULOCYTES # BLD AUTO: 0.07 X10(3) UL (ref 0–1)
IMM GRANULOCYTES NFR BLD: 0.7 %
INR BLD: 1.36 (ref 0.89–1.11)
KETONES UR STRIP.AUTO-MCNC: NEGATIVE MG/DL
LYMPHOCYTES # BLD AUTO: 2.22 X10(3) UL (ref 1–4)
LYMPHOCYTES NFR BLD AUTO: 22.4 %
M PROTEIN MFR SERPL ELPH: 6.9 G/DL (ref 6.4–8.2)
MCH RBC QN AUTO: 35 PG (ref 26–34)
MCHC RBC AUTO-ENTMCNC: 33.2 G/DL (ref 31–37)
MCV RBC AUTO: 105.5 FL
MDMA UR QL SCN: NEGATIVE
MONOCYTES # BLD AUTO: 0.57 X10(3) UL (ref 0.1–1)
MONOCYTES NFR BLD AUTO: 5.7 %
NEUTROPHILS # BLD AUTO: 7.04 X10 (3) UL (ref 1.5–7.7)
NEUTROPHILS # BLD AUTO: 7.04 X10(3) UL (ref 1.5–7.7)
NEUTROPHILS NFR BLD AUTO: 70.9 %
NITRITE UR QL STRIP.AUTO: NEGATIVE
OPIATES UR QL SCN: NEGATIVE
OSMOLALITY SERPL CALC.SUM OF ELEC: 289 MOSM/KG (ref 275–295)
OXYCODONE UR QL SCN: NEGATIVE
P AXIS: 23 DEGREES
P-R INTERVAL: 188 MS
PH UR STRIP.AUTO: 6 [PH] (ref 5–8)
PLATELET # BLD AUTO: 174 10(3)UL (ref 150–450)
POTASSIUM SERPL-SCNC: 3.7 MMOL/L (ref 3.5–5.1)
PROT UR STRIP.AUTO-MCNC: NEGATIVE MG/DL
PSA SERPL DL<=0.01 NG/ML-MCNC: 17.1 SECONDS (ref 12.2–14.5)
Q-T INTERVAL: 424 MS
QRS DURATION: 84 MS
QTC CALCULATION (BEZET): 479 MS
R AXIS: 53 DEGREES
RBC # BLD AUTO: 3.46 X10(6)UL
RBC #/AREA URNS AUTO: >10 /HPF
RBC UR QL AUTO: NEGATIVE
SARS-COV-2 RNA RESP QL NAA+PROBE: NOT DETECTED
SODIUM SERPL-SCNC: 141 MMOL/L (ref 136–145)
SP GR UR STRIP.AUTO: >1.03 (ref 1–1.03)
T AXIS: 7 DEGREES
TROPONIN I SERPL-MCNC: <0.045 NG/ML (ref ?–0.04)
UROBILINOGEN UR STRIP.AUTO-MCNC: 4 MG/DL
VENTRICULAR RATE: 77 BPM
WBC # BLD AUTO: 9.9 X10(3) UL (ref 4–11)

## 2021-08-18 PROCEDURE — 99291 CRITICAL CARE FIRST HOUR: CPT | Performed by: OTHER

## 2021-08-18 PROCEDURE — 70496 CT ANGIOGRAPHY HEAD: CPT | Performed by: EMERGENCY MEDICINE

## 2021-08-18 PROCEDURE — 99292 CRITICAL CARE ADDL 30 MIN: CPT | Performed by: OTHER

## 2021-08-18 PROCEDURE — 71045 X-RAY EXAM CHEST 1 VIEW: CPT | Performed by: OTHER

## 2021-08-18 PROCEDURE — 70498 CT ANGIOGRAPHY NECK: CPT | Performed by: EMERGENCY MEDICINE

## 2021-08-18 PROCEDURE — 70450 CT HEAD/BRAIN W/O DYE: CPT | Performed by: EMERGENCY MEDICINE

## 2021-08-18 RX ORDER — LORAZEPAM 2 MG/ML
1 INJECTION INTRAMUSCULAR ONCE
Status: COMPLETED | OUTPATIENT
Start: 2021-08-18 | End: 2021-08-18

## 2021-08-18 RX ORDER — DEXMEDETOMIDINE HYDROCHLORIDE 4 UG/ML
INJECTION, SOLUTION INTRAVENOUS CONTINUOUS PRN
Status: DISCONTINUED | OUTPATIENT
Start: 2021-08-18 | End: 2021-08-24 | Stop reason: HOSPADM

## 2021-08-18 RX ORDER — LORAZEPAM 2 MG/ML
2 INJECTION INTRAMUSCULAR EVERY 30 MIN PRN
Status: DISCONTINUED | OUTPATIENT
Start: 2021-08-18 | End: 2021-09-07

## 2021-08-18 RX ORDER — MULTIPLE VITAMINS W/ MINERALS TAB 9MG-400MCG
1 TAB ORAL DAILY
Status: DISCONTINUED | OUTPATIENT
Start: 2021-08-18 | End: 2021-09-07

## 2021-08-18 RX ORDER — LORAZEPAM 2 MG/ML
1 INJECTION INTRAMUSCULAR
Status: DISCONTINUED | OUTPATIENT
Start: 2021-08-18 | End: 2021-09-07

## 2021-08-18 RX ORDER — LORAZEPAM 2 MG/ML
4 INJECTION INTRAMUSCULAR
Status: DISCONTINUED | OUTPATIENT
Start: 2021-08-18 | End: 2021-09-07

## 2021-08-18 RX ORDER — LORAZEPAM 2 MG/ML
1 INJECTION INTRAMUSCULAR ONCE
Status: DISCONTINUED | OUTPATIENT
Start: 2021-08-18 | End: 2021-08-18

## 2021-08-18 RX ORDER — MELATONIN
100 DAILY
Status: DISCONTINUED | OUTPATIENT
Start: 2021-08-18 | End: 2021-09-07

## 2021-08-18 RX ORDER — PREDNISONE 10 MG/1
10 TABLET ORAL
Status: DISCONTINUED | OUTPATIENT
Start: 2021-09-04 | End: 2021-08-20

## 2021-08-18 RX ORDER — LORAZEPAM 2 MG/ML
3 INJECTION INTRAMUSCULAR EVERY 30 MIN PRN
Status: DISCONTINUED | OUTPATIENT
Start: 2021-08-18 | End: 2021-09-07

## 2021-08-18 RX ORDER — LORAZEPAM 2 MG/ML
INJECTION INTRAMUSCULAR
Status: COMPLETED
Start: 2021-08-18 | End: 2021-08-18

## 2021-08-18 RX ORDER — PREDNISONE 20 MG/1
40 TABLET ORAL
Status: DISCONTINUED | OUTPATIENT
Start: 2021-08-21 | End: 2021-08-20

## 2021-08-18 RX ORDER — PREDNISONE 20 MG/1
60 TABLET ORAL
Status: DISCONTINUED | OUTPATIENT
Start: 2021-08-19 | End: 2021-08-20

## 2021-08-18 RX ORDER — SODIUM CHLORIDE 9 MG/ML
INJECTION, SOLUTION INTRAVENOUS CONTINUOUS
Status: DISCONTINUED | OUTPATIENT
Start: 2021-08-18 | End: 2021-08-20

## 2021-08-18 RX ORDER — PREDNISONE 20 MG/1
20 TABLET ORAL
Status: DISCONTINUED | OUTPATIENT
Start: 2021-08-28 | End: 2021-08-20

## 2021-08-18 RX ORDER — PREDNISONE 20 MG/1
60 TABLET ORAL SEE ADMIN INSTRUCTIONS
Status: DISCONTINUED | OUTPATIENT
Start: 2021-08-18 | End: 2021-08-18 | Stop reason: SDUPTHER

## 2021-08-18 RX ORDER — METOPROLOL SUCCINATE 25 MG/1
25 TABLET, EXTENDED RELEASE ORAL EVERY EVENING
Status: DISCONTINUED | OUTPATIENT
Start: 2021-08-18 | End: 2021-08-23

## 2021-08-18 RX ORDER — POTASSIUM CHLORIDE 14.9 MG/ML
20 INJECTION INTRAVENOUS ONCE
Status: COMPLETED | OUTPATIENT
Start: 2021-08-18 | End: 2021-08-18

## 2021-08-18 RX ORDER — FOLIC ACID 1 MG/1
1 TABLET ORAL DAILY
Status: DISCONTINUED | OUTPATIENT
Start: 2021-08-18 | End: 2021-09-07

## 2021-08-18 RX ORDER — SODIUM CHLORIDE 9 MG/ML
INJECTION, SOLUTION INTRAVENOUS CONTINUOUS
Status: ACTIVE | OUTPATIENT
Start: 2021-08-18 | End: 2021-08-18

## 2021-08-18 RX ORDER — PREDNISONE 1 MG/1
5 TABLET ORAL
Status: DISCONTINUED | OUTPATIENT
Start: 2021-09-11 | End: 2021-08-20

## 2021-08-18 NOTE — ED PROVIDER NOTES
Patient Seen in: [facility] Emergency Department      History   Patient presents with:  Seizure Disorder    Stated Complaint: seizure    HPI/Subjective:   HPI    72-year-old female with history of coronary disease, cardiomyopathy, depression, hypert Social History    Tobacco Use      Smoking status: Former Smoker        Packs/day: 0.75        Years: 10.00        Pack years: 7.5        Quit date: 11/3/1996        Years since quittin.8      Smokeless tobacco: Never Used    Vaping Use      Vap Albumin 2.5 (*)     A/G Ratio 0.6 (*)     All other components within normal limits   PROTHROMBIN TIME (PT) - Abnormal; Notable for the following components:    PT 17.1 (*)     INR 1.36 (*)     All other components within normal limits   CBC W/ DIFFERE the left lower extremity concerning for focal seizure, and patient was noted not to be able to move the left upper extremity, discussed with Dr Lulú Espinoza, neuro intensivist for possible stroke as patient does have history of CVA on Eliquis, recommended CTA o

## 2021-08-18 NOTE — H&P
[facility] Hospitalist H&P       CC: seizure    PCP: Mariela Horta DO    History of Present Illness: Pt is a 54 yo with mmp including but not limited to CAD, HTN/HL, etoh use disorder, cerebrovascular dz with hx CVA, seizure disorder, melanoma on immunoth total) by mouth daily for 7 days. , Disp: 7 tablet, Rfl: 0  SERTRALINE  MG Oral Tab, TAKE ONE TABLET BY MOUTH DAILY, Disp: 90 tablet, Rfl: 0  FOLIC ACID 1 MG Oral Tab, TAKE ONE TABLET BY MOUTH DAILY, Disp: 90 tablet, Rfl: 0  VIMPAT 50 MG Oral Tab, TA nose, throat:  external ears and nose within normal limits,        Neck: Supple, symmetrical   Lungs:   Clear to auscultation bilaterally. Normal effort   Chest wall:  No tenderness or deformity.    Heart:  Regular rate and rhythm, S1, S2 normal,no LE edema ASSESSMENT / PLAN:      54 yo with mmp including but not limited to CAD, HTN/HL, etoh use disorder, cerebrovascular dz with hx CVA, seizure disorder, melanoma on immunotherapy, is admitted for seizure (tonic clonic)- then question of LLE twitching and

## 2021-08-18 NOTE — ED INITIAL ASSESSMENT (HPI)
PT woke up and it was a day that they where moving her son to collage and then she started seizing. PT has a Hx of stroke and seizures.

## 2021-08-18 NOTE — CONSULTS
[address]   NEUROCRITICAL CARE   CONSULT NOTE    Admission date: 8/18/2021  Reason for Consult: Status epilepticus  Chief Complaint: Patient presents with:  Seizure Disorder  ____________________________________________________________ Quit date: 11/3/1996        Years since quittin.8      Smokeless tobacco: Never Used    Vaping Use      Vaping Use: Never used    Alcohol use:  Yes      Alcohol/week: 3.0 standard drinks      Types: 3 Cans of beer per week      Comment: 3 cans beer loss, tinnitus, epistaxis, sores, voice changes, sore throat   CV: chest pain, dyspnea, palpitations, orthopnea, PND, LE swelling   RESP: cough, sputum, dyspnea, hemoptysis, pleuritic pain   GI: abdominal pain, nausea, vomiting, hematemesis, diarrhea, cons detail.   Recent Labs   Lab 08/16/21  0907 08/18/21  0735    141   K 3.65 3.7    108   CO2 32.9* 28.0   GLU 89 82   BUN 12.0 8   CREATSERUM 0.63 0.69     Recent Labs   Lab 08/16/21  0907 08/18/21  0735   WBC 8.62 9.9   HGB 11.5* 12.1    1 stenosis, occlusion, dissection, or aneurysm is identified within the major intracranial or cervical arterial vasculature.     Dictated by (CST): Theresa Kendrick MD on 8/18/2021 at 9:19 AM     Finalized by (CST): Theresa Kendrick MD on 8/18/2021 at 9:35 - IVF while NPO   - Check CK level given 2 GTCs   - Avoid nephrotoxic medications   - Continue to monitor    Hypokalemia - K being replaced this AM     Gastrointestinal:  Nutrition:        - NPO pending stability         - Bowel regimen: ordered

## 2021-08-19 ENCOUNTER — APPOINTMENT (OUTPATIENT)
Dept: MRI IMAGING | Facility: HOSPITAL | Age: 56
DRG: 101 | End: 2021-08-19
Attending: Other
Payer: COMMERCIAL

## 2021-08-19 ENCOUNTER — APPOINTMENT (OUTPATIENT)
Dept: CV DIAGNOSTICS | Facility: HOSPITAL | Age: 56
DRG: 101 | End: 2021-08-19
Attending: Other
Payer: COMMERCIAL

## 2021-08-19 LAB
ANION GAP SERPL CALC-SCNC: 2 MMOL/L (ref 0–18)
BUN BLD-MCNC: 13 MG/DL (ref 7–18)
CALCIUM BLD-MCNC: 7.7 MG/DL (ref 8.5–10.1)
CHLORIDE SERPL-SCNC: 115 MMOL/L (ref 98–112)
CO2 SERPL-SCNC: 27 MMOL/L (ref 21–32)
CREAT BLD-MCNC: 0.53 MG/DL
ERYTHROCYTE [DISTWIDTH] IN BLOOD BY AUTOMATED COUNT: 15.2 %
GLUCOSE BLD-MCNC: 58 MG/DL (ref 70–99)
GLUCOSE BLD-MCNC: 66 MG/DL (ref 70–99)
GLUCOSE BLD-MCNC: 72 MG/DL (ref 70–99)
GLUCOSE BLD-MCNC: 86 MG/DL (ref 70–99)
GLUCOSE BLD-MCNC: 93 MG/DL (ref 70–99)
HAV IGM SER QL: 2.1 MG/DL (ref 1.6–2.6)
HCT VFR BLD AUTO: 29.6 %
HGB BLD-MCNC: 9.6 G/DL
MCH RBC QN AUTO: 35.2 PG (ref 26–34)
MCHC RBC AUTO-ENTMCNC: 32.4 G/DL (ref 31–37)
MCV RBC AUTO: 108.4 FL
OSMOLALITY SERPL CALC.SUM OF ELEC: 296 MOSM/KG (ref 275–295)
PHOSPHATE SERPL-MCNC: 3 MG/DL (ref 2.5–4.9)
PLATELET # BLD AUTO: 122 10(3)UL (ref 150–450)
POTASSIUM SERPL-SCNC: 3.8 MMOL/L (ref 3.5–5.1)
RBC # BLD AUTO: 2.73 X10(6)UL
SODIUM SERPL-SCNC: 144 MMOL/L (ref 136–145)
VALPROATE SERPL-MCNC: 90.3 UG/ML (ref 50–100)
WBC # BLD AUTO: 9.1 X10(3) UL (ref 4–11)

## 2021-08-19 PROCEDURE — 93306 TTE W/DOPPLER COMPLETE: CPT | Performed by: OTHER

## 2021-08-19 PROCEDURE — 70553 MRI BRAIN STEM W/O & W/DYE: CPT | Performed by: OTHER

## 2021-08-19 PROCEDURE — 95720 EEG PHY/QHP EA INCR W/VEEG: CPT | Performed by: OTHER

## 2021-08-19 PROCEDURE — 99291 CRITICAL CARE FIRST HOUR: CPT | Performed by: OTHER

## 2021-08-19 RX ORDER — POTASSIUM CHLORIDE 20 MEQ/1
40 TABLET, EXTENDED RELEASE ORAL ONCE
Status: COMPLETED | OUTPATIENT
Start: 2021-08-19 | End: 2021-08-19

## 2021-08-19 RX ORDER — TOPIRAMATE 25 MG/1
100 TABLET ORAL 2 TIMES DAILY
Status: DISCONTINUED | OUTPATIENT
Start: 2021-08-19 | End: 2021-08-19

## 2021-08-19 RX ORDER — TOPIRAMATE 100 MG/1
100 TABLET, FILM COATED ORAL 2 TIMES DAILY
Status: DISCONTINUED | OUTPATIENT
Start: 2021-08-19 | End: 2021-08-22

## 2021-08-19 RX ORDER — DEXTROSE MONOHYDRATE 25 G/50ML
INJECTION, SOLUTION INTRAVENOUS
Status: COMPLETED
Start: 2021-08-19 | End: 2021-08-19

## 2021-08-19 NOTE — PLAN OF CARE
Assumed patient care from ED around 1430. Patient drowsy but arousable. No new changes neurologically. L side weakness from baseline residual. 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  L leg with twitching movement since about 1700, but patient remained A/Ox4,  Dr. Chanel Richards

## 2021-08-19 NOTE — PROGRESS NOTES
[facility] Hospitalist Progress Note     PCP: Ernie Ba DO    CC:  Follow up    SUBJECTIVE:  Sitting up in bed,  at bedside- AxO x3, LLE twitching. LUE weak still.   at bedside  OBJECTIVE:  Temp:  [98.3 °F (36.8 °C)-99.6 °F (37.6 °C)] 98.3 °F 200 mg Intravenous BID   • levETIRAcetam  1,500 mg Intravenous Q12H   • thiamine  100 mg Oral Daily   • multivitamin with minerals  1 tablet Oral Daily   • folic acid  1 mg Oral Daily   • apixaban  5 mg Oral BID   • metoprolol succinate  25 mg Oral QPM HTN/HL -DUSTIN ding. Statin on hold (see below), ACEI when ok with neuro  **etoh use disorder-ciwa, folic acid, thiamine, mvi  **melanoma on immunotherapy, transaminitis, previously elevated CK- on steroids per heme onc, continue.  Monitor BS     **PPx-scds

## 2021-08-19 NOTE — PLAN OF CARE
Pt awake, a/o x4. Pt with LUE weakness. Hand grasp weak. Pt is able to lift her left arm just slightly off the bed but unable to hold it up. Pt also not able to do finger to nose with the LUE. Left leg continues to twitch.  Pt is unable to lift her left leg

## 2021-08-19 NOTE — BH PROGRESS NOTE
Diandra Dural  went to see the pt for her etoh history. He said, he spoke to the patients  who said, she is not able to comprehend any type of learning or training due to a severe stroke several month ago.   Patient was given a

## 2021-08-19 NOTE — PROGRESS NOTES
[address]   NEUROCRITICAL CARE   PROGRESS NOTE    Admission date: 8/18/2021  Reason for Consult: Focal status  Chief Complaint: Patient presents with:  Seizure Disorder  ________________________________________________________________  141 144   K 3.65 3.7 3.8    108 115*   CO2 32.9* 28.0 27.0   GLU 89 82 66*   BUN 12.0 8 13   CREATSERUM 0.63 0.69 0.53*     Recent Labs   Lab 08/16/21  0907 08/18/21  0735 08/19/21  0453   WBC 8.62 9.9 9.1   HGB 11.5* 12.1 9.6*    89 old female with PMH of CAD c/b MI, R PCA Stroke (on eliquis) c/b seizures, depression, HTN, EtOH abuse,and stage 3 melanoma currently receiving chemotherapy here with concern for status epilepticus.     Neurological:   Epilepsia partialis continua  Hx of S labetalol/hydralazine pushes and Nicardipine gtt prn            Pulmonary: - Satting well on RA, encourage IS for atelectasis noted on CXR     Renal:   - Monitor I&Os       Gastrointestinal:  Nutrition:        - ADAT         - Bowel regimen: ordered     In

## 2021-08-19 NOTE — PAYOR COMM NOTE
--------------  ADMISSION REVIEW     Payor: Arianne Zapata #:  [id number]  Authorization Number: [id number]    Admit date: 8/18/21  Admit time:  1:59 PM     History   Patient presents with:  Seizure Disorder    Stated Complaint: seizure CEREBRAL CAROTID BILATERAL  7/16/2018        • SKIN SURGERY  2020    melanoma removal       Physical Exam     ED Triage Vitals [08/18/21 0729]   BP (!) 150/118   Pulse 96   Resp (!) 30   Temp 98.8 °F (37.1 °C)   Temp src Temporal   SpO2 97 %   O2 Device No PLATELET    Narrative: The following orders were created for panel order CBC With Differential With Platelet.   Procedure                               Abnormality         Status                     ---------                               ----------- epilepticus, patient blood pressure has been on the low side in the low 90s consistent with the amount of Ativan she has been given. Patient has been given IV fluid hydration.   Patient require mission to the neuro ICU, discussed with the [facility] hospitalist D is unchanged. Sequelae of chronic small vessel ischemic disease is noted. No evidence of intracranial hemorrhage or extra-axial fluid collection.    Dictated by (CST): Carmen Kramer MD on 8/18/2021 at 8:08 AM     Finalized by (CST): Carmen Kramer, Marlen Jaime MD  [facility] Hospitalist  Answering Service number: [phone]       Electronically signed by Lillian Sloan MD on 8/18/2021  6:36 PM         MEDICATIONS ADMINISTERED IN LAST 1 DAY:  apixaban (ELIQUIS) tab 5 mg     Date Action Dose Route User 8/19/2021 0400 Rate/Dose Verify (none) Intravenous Sugey Olivier RN    8/19/2021 0000 Rate/Dose Verify (none) Intravenous Sugey Olivier RN    8/18/2021 1430 New Bag (none) Intravenous Beth Burr RN      multivitamin with minerals (ADUL 08/18/21 1700  —  62  17  110/89  100 %  —  —  — SC    08/18/21 1600  99.1 °F (37.3 °C)  70  16  (!) 120/91  100 %  —  Nasal cannula  1 L/min SC    08/18/21 1530  —  64  19  125/69  100 %  —  —  — SC    08/18/21 1500  —  59  16  140/63  100 %  —  —  — SC - Etiology of status unclear, states she has been compliant, denies fevers or recent issues         - CTA negative for LVO to explain ongoing LLE weakness so likely 2/2 danette's paralysis vs recrudescence of old stroke symptoms         - cEEG ordered continue partial seizure? **LLE twitching and LE weakness  -reportedly complaint with AEDs  AEDs per neuro  -CT, CTA brain noted without acute issue, MRI brain without acute findings.  Continuous EEG per neuro noted  -UA contaminant likely, CXR reviewed pe

## 2021-08-19 NOTE — PROCEDURES
ELECTROENCEPHALOGRAM REPORT      Patient Name: Jojo Griffin   : 1965   Requesting Physician: Dr. Audi Chiu   Date of Test: 21 at 15:28 until 21 at 15:28pm  History: 55year old female with history of epilepsy presented with left leg t DO  Neuromuscular and General Neurology  [facility]

## 2021-08-20 LAB
ANION GAP SERPL CALC-SCNC: 4 MMOL/L (ref 0–18)
ATRIAL RATE: 95 BPM
BUN BLD-MCNC: 8 MG/DL (ref 7–18)
CALCIUM BLD-MCNC: 7.8 MG/DL (ref 8.5–10.1)
CHLORIDE SERPL-SCNC: 113 MMOL/L (ref 98–112)
CK SERPL-CCNC: 59 U/L
CO2 SERPL-SCNC: 25 MMOL/L (ref 21–32)
CREAT BLD-MCNC: 0.77 MG/DL
ERYTHROCYTE [DISTWIDTH] IN BLOOD BY AUTOMATED COUNT: 15.3 %
GLUCOSE BLD-MCNC: 107 MG/DL (ref 70–99)
GLUCOSE BLD-MCNC: 108 MG/DL (ref 70–99)
GLUCOSE BLD-MCNC: 109 MG/DL (ref 70–99)
GLUCOSE BLD-MCNC: 122 MG/DL (ref 70–99)
GLUCOSE BLD-MCNC: 124 MG/DL (ref 70–99)
GLUCOSE BLD-MCNC: 147 MG/DL (ref 70–99)
GLUCOSE BLD-MCNC: 168 MG/DL (ref 70–99)
GLUCOSE BLD-MCNC: 199 MG/DL (ref 70–99)
GLUCOSE BLD-MCNC: 63 MG/DL (ref 70–99)
GLUCOSE BLD-MCNC: 65 MG/DL (ref 70–99)
GLUCOSE BLD-MCNC: 90 MG/DL (ref 70–99)
GLUCOSE BLD-MCNC: 94 MG/DL (ref 70–99)
HAV IGM SER QL: 2 MG/DL (ref 1.6–2.6)
HCT VFR BLD AUTO: 36.2 %
HGB BLD-MCNC: 11 G/DL
LEVETIRACETAM (KEPPRA): 110 UG/ML
MCH RBC QN AUTO: 33.8 PG (ref 26–34)
MCHC RBC AUTO-ENTMCNC: 30.4 G/DL (ref 31–37)
MCV RBC AUTO: 111.4 FL
OSMOLALITY SERPL CALC.SUM OF ELEC: 293 MOSM/KG (ref 275–295)
P AXIS: 17 DEGREES
P-R INTERVAL: 184 MS
PHOSPHATE SERPL-MCNC: 2.2 MG/DL (ref 2.5–4.9)
PLATELET # BLD AUTO: 125 10(3)UL (ref 150–450)
POTASSIUM SERPL-SCNC: 4 MMOL/L (ref 3.5–5.1)
Q-T INTERVAL: 392 MS
QRS DURATION: 82 MS
QTC CALCULATION (BEZET): 492 MS
R AXIS: 39 DEGREES
RBC # BLD AUTO: 3.25 X10(6)UL
SODIUM SERPL-SCNC: 142 MMOL/L (ref 136–145)
T AXIS: 21 DEGREES
VENTRICULAR RATE: 95 BPM
WBC # BLD AUTO: 7.5 X10(3) UL (ref 4–11)

## 2021-08-20 PROCEDURE — 99291 CRITICAL CARE FIRST HOUR: CPT | Performed by: OTHER

## 2021-08-20 RX ORDER — PREDNISONE 10 MG/1
10 TABLET ORAL
Status: DISCONTINUED | OUTPATIENT
Start: 2021-08-26 | End: 2021-08-23

## 2021-08-20 RX ORDER — PREDNISONE 20 MG/1
20 TABLET ORAL
Status: DISCONTINUED | OUTPATIENT
Start: 2021-08-24 | End: 2021-08-23

## 2021-08-20 RX ORDER — SODIUM CHLORIDE 9 MG/ML
INJECTION, SOLUTION INTRAVENOUS CONTINUOUS
Status: DISCONTINUED | OUTPATIENT
Start: 2021-08-20 | End: 2021-08-21

## 2021-08-20 RX ORDER — LEVOTHYROXINE SODIUM 0.03 MG/1
25 TABLET ORAL
Status: DISCONTINUED | OUTPATIENT
Start: 2021-08-21 | End: 2021-08-26

## 2021-08-20 RX ORDER — PREDNISONE 20 MG/1
40 TABLET ORAL
Status: DISCONTINUED | OUTPATIENT
Start: 2021-08-22 | End: 2021-08-20

## 2021-08-20 RX ORDER — PREDNISONE 1 MG/1
5 TABLET ORAL
Status: DISCONTINUED | OUTPATIENT
Start: 2021-08-27 | End: 2021-08-23

## 2021-08-20 RX ORDER — PREDNISONE 10 MG/1
10 TABLET ORAL
Status: DISCONTINUED | OUTPATIENT
Start: 2021-08-28 | End: 2021-08-20

## 2021-08-20 RX ORDER — DEXTROSE MONOHYDRATE 25 G/50ML
50 INJECTION, SOLUTION INTRAVENOUS
Status: DISCONTINUED | OUTPATIENT
Start: 2021-08-19 | End: 2021-09-07

## 2021-08-20 RX ORDER — PREDNISONE 1 MG/1
5 TABLET ORAL
Status: DISCONTINUED | OUTPATIENT
Start: 2021-08-30 | End: 2021-08-20

## 2021-08-20 RX ORDER — PHENOBARBITAL SODIUM 65 MG/ML
100 INJECTION INTRAMUSCULAR ONCE
Status: COMPLETED | OUTPATIENT
Start: 2021-08-20 | End: 2021-08-20

## 2021-08-20 RX ORDER — PREDNISONE 20 MG/1
20 TABLET ORAL
Status: DISCONTINUED | OUTPATIENT
Start: 2021-08-26 | End: 2021-08-20

## 2021-08-20 RX ORDER — PREDNISONE 2.5 MG
2.5 TABLET ORAL
Status: DISCONTINUED | OUTPATIENT
Start: 2021-08-28 | End: 2021-08-21

## 2021-08-20 RX ORDER — PREDNISONE 20 MG/1
40 TABLET ORAL
Status: COMPLETED | OUTPATIENT
Start: 2021-08-21 | End: 2021-08-21

## 2021-08-20 NOTE — PROGRESS NOTES
[facility] Hospitalist Progress Note     PCP: Iman Michaud DO    CC:  Follow up    SUBJECTIVE:  Reportedly hallucinating/agitated overnight- IV ativan given. Currently asleep.  Continuous EEG  Had some hypoglycemia this am, requiring amps d50  OBJECTIVE:  Temp 08/18/21  1453   TROP <0.045          Meds:     • valproate  500 mg Intravenous Q8H   • [START ON 8/21/2021] predniSONE  40 mg Oral Daily with breakfast    Followed by   • [START ON 8/22/2021] predniSONE  30 mg Oral Daily with breakfast    Followed by   • no consolidation  -?possibly alcohol related - last drink night PTA reportedly (ciwa-see below)  -UDS noted. Rapid covid neg. keppra level pending. Alcohol level <3    **acute anemia, thrombocytopenia- suspect reactive, monitor. Possible d/t alcohol use.  O

## 2021-08-20 NOTE — PLAN OF CARE
Assumed patient this AM. Patient on continuous EEG. While in MRI patient having minor hallucinations and restlessness. 2 mg Ativan given to keep calm. When returning to the room,  at bedside.  Patient started showing increased signs of withdrawal suc

## 2021-08-20 NOTE — PROGRESS NOTES
[address]   NEUROCRITICAL CARE   PROGRESS NOTE    Admission date: 8/18/2021  Reason for Consult: Focal status  Chief Complaint: Patient presents with:  Seizure Disorder  ________________________________________________________________ HGB 12.1 9.6* 11.0*   .0 122.0* 125.0*     Recent Labs   Lab 08/16/21  0907 08/18/21  0735   * 117*   * 118*     Recent Labs   Lab 08/19/21  0453 08/20/21  0434   MG 2.1 2.0   PHOS 3.0 2.2*       Scheduled Meds:  • valproate  500 mg Services*                                                     [address], [address] was mildly increased. 3. No hemodynamically significant valve dysfunction. Impressions: This study is compared with previous dated 10/30/2019: Compared to the prior study, there has been no significant interval change.  * ---------------------------------- Value        Reference  LV ID, ED, PLAX                           4.8   cm     3.9 - 5.3  LV ID, ES, PLAX                           3.4   cm     ---------  LV fx shortening, PLAX                    28    %      27 - 45  LV mid-wall fx shortenin shaking improved after vimpat load         -- Etiology of status unclear, states she has been compliant (although outpatient notes states she has had on and off N/V), denies fevers or recent issues, current chemo with rare reports of seizures as well, was monitor daily      Thrombocytopenia - Previously hemoconcentrated, continue to monitor      Stage 3 melanoma        - Getting chemo as an outpatient        - Continue prednisone taper          Endocrine: - Accuchecks q6 with SSI prn      Hypothyroidism - T

## 2021-08-20 NOTE — PLAN OF CARE
Assumed care at 0730. Pt drowsy, oriented to person and disoriented to place, time, and situation, following most commands. CIWA 4-10, 2mg ativan given. VSS, NSR on tele. Pt  at bedside, all questions answered.

## 2021-08-20 NOTE — PLAN OF CARE
Received pt at 1930. A/O x3 at the beginning of shift, as the night went on pt became more disorientated (alert to self) and hallucinating (swatting snakes away). Ativan 1mg administered few times. CIWA scale 2-7.  L LE with continues tremors, moves other e

## 2021-08-21 PROBLEM — F10.231 ALCOHOL WITHDRAWAL DELIRIUM, ACUTE, HYPOACTIVE (HCC): Status: ACTIVE | Noted: 2019-12-20

## 2021-08-21 LAB
7-AMINOCLONAZEPAM, URINE: <5 NG/ML
ALPHA-HYDROXYALPRAZOLAM, URINE: <5 NG/ML
ALPHA-HYDROXYMIDAZOLAM, URINE: <20 NG/ML
ALPRAZOLAM, URINE: <5 NG/ML
ANION GAP SERPL CALC-SCNC: 6 MMOL/L (ref 0–18)
BUN BLD-MCNC: 8 MG/DL (ref 7–18)
CALCIUM BLD-MCNC: 7.9 MG/DL (ref 8.5–10.1)
CHLORDIAZEPOXIDE, URINE: <20 NG/ML
CHLORIDE SERPL-SCNC: 115 MMOL/L (ref 98–112)
CLONAZEPAM, URINE: <5 NG/ML
CO2 SERPL-SCNC: 22 MMOL/L (ref 21–32)
CREAT BLD-MCNC: 0.63 MG/DL
DIAZEPAM, URINE: <20 NG/ML
ERYTHROCYTE [DISTWIDTH] IN BLOOD BY AUTOMATED COUNT: 14.7 %
GLUCOSE BLD-MCNC: 100 MG/DL (ref 70–99)
GLUCOSE BLD-MCNC: 105 MG/DL (ref 70–99)
GLUCOSE BLD-MCNC: 148 MG/DL (ref 70–99)
GLUCOSE BLD-MCNC: 66 MG/DL (ref 70–99)
GLUCOSE BLD-MCNC: 71 MG/DL (ref 70–99)
GLUCOSE BLD-MCNC: 73 MG/DL (ref 70–99)
GLUCOSE BLD-MCNC: 75 MG/DL (ref 70–99)
GLUCOSE BLD-MCNC: 75 MG/DL (ref 70–99)
HAV IGM SER QL: 2.3 MG/DL (ref 1.6–2.6)
HCT VFR BLD AUTO: 36 %
HGB BLD-MCNC: 11.5 G/DL
LORAZEPAM, URINE: 1247 NG/ML
MCH RBC QN AUTO: 34.8 PG (ref 26–34)
MCHC RBC AUTO-ENTMCNC: 31.9 G/DL (ref 31–37)
MCV RBC AUTO: 109.1 FL
MIDAZOLAM, URINE: <20 NG/ML
NORDIAZEPAM, URINE: <20 NG/ML
OSMOLALITY SERPL CALC.SUM OF ELEC: 293 MOSM/KG (ref 275–295)
OXAZEPAM, URINE: <20 NG/ML
PHOSPHATE SERPL-MCNC: 3.3 MG/DL (ref 2.5–4.9)
PLATELET # BLD AUTO: 118 10(3)UL (ref 150–450)
POTASSIUM SERPL-SCNC: 3.6 MMOL/L (ref 3.5–5.1)
RBC # BLD AUTO: 3.3 X10(6)UL
SODIUM SERPL-SCNC: 143 MMOL/L (ref 136–145)
TEMAZEPAM, URINE: <20 NG/ML
VALPROATE SERPL-MCNC: 83.8 UG/ML (ref 50–100)
WBC # BLD AUTO: 5.8 X10(3) UL (ref 4–11)

## 2021-08-21 PROCEDURE — 99291 CRITICAL CARE FIRST HOUR: CPT | Performed by: OTHER

## 2021-08-21 RX ORDER — DEXTROSE AND SODIUM CHLORIDE 5; .9 G/100ML; G/100ML
INJECTION, SOLUTION INTRAVENOUS CONTINUOUS
Status: DISCONTINUED | OUTPATIENT
Start: 2021-08-21 | End: 2021-08-23

## 2021-08-21 RX ORDER — SERTRALINE HYDROCHLORIDE 100 MG/1
100 TABLET, FILM COATED ORAL DAILY
Status: DISCONTINUED | OUTPATIENT
Start: 2021-08-21 | End: 2021-09-07

## 2021-08-21 NOTE — PLAN OF CARE
Assumed care at 0730. Pt drowsy, oriented to self, intermittently follows commands, MERCADO spontaneously. EEG discontinued. Pt with hypoglycemia, treated per protocol, Dr Stefan Yañez notified. Purewick in place. Pt  updated on plan of care.  Pt to be transf

## 2021-08-21 NOTE — PROGRESS NOTES
NURSING TRANSFER NOTE      Patient transferred at [phone]  Oriented to room. Safety precautions initiated. Bed in low position. Call light in reach.   Seizure precautions in place  Needs attended to

## 2021-08-21 NOTE — PROGRESS NOTES
[address]   NEUROCRITICAL CARE   PROGRESS NOTE    Admission date: 8/18/2021  Reason for Consult: Focal status  Chief Complaint: Patient presents with:  Seizure Disorder  ________________________________________________________________ Lab 08/19/21  0453 08/20/21  0434 08/21/21  0725   WBC 9.1 7.5 5.8   HGB 9.6* 11.0* 11.5*   .0* 125.0* 118.0*     Recent Labs   Lab 08/16/21  0907 08/18/21  0735   * 117*   * 118*     Recent Labs   Lab 08/19/21  0453 08/20/21  0434 0 LLE shaking improved after vimpat load         -- Etiology of status unclear, states she has been compliant (although outpatient notes states she has had on and off N/V), denies fevers or recent issues, current chemo with rare reports of seizures as well, - Bowel regimen: ordered     Infectious Disease: - No leukocytosis, afebrile, continue to monitor      Heme/Onc   Stage 3 melanoma        - Getting chemo as an outpatient        - Shortened prednisone taper    Macrocytic Anemia - U91 and folic wnl in May,

## 2021-08-21 NOTE — PLAN OF CARE
Received pt at 1930. Alert to self only. Very drowsy. Opens eyes to command. MERCADO to painful stimuli. Ongoing continues EEG. CIWA scale 2 through out the shift. Pure wick in place. Plan of care explained. Continue to monitor.

## 2021-08-21 NOTE — PROGRESS NOTES
[facility] Hospitalist Progress Note     PCP: Daniela Francois DO    CC:  Follow up    SUBJECTIVE:  Awake and unresponsive,  at bedside.  Continuous EEG     OBJECTIVE:  Temp:  [97.6 °F (36.4 °C)-98.3 °F (36.8 °C)] 98 °F (36.7 °C)  Pulse:  [] 59  Resp: 109* 107* 100* 71       Recent Labs   Lab 08/18/21  1453   TROP <0.045          Meds:     • potassium chloride  40 mEq Intravenous Once   • sertraline  100 mg Oral Daily   • valproate  500 mg Intravenous Q8H   • [START ON 8/22/2021] predniSONE  30 mg Oral consolidation  -?possibly alcohol related - last drink night PTA reportedly (ciwa-see below)  -UDS noted. Rapid covid neg. keppra level noted. Alcohol level <3    **acute anemia, thrombocytopenia- suspect reactive, monitor. Possible d/t alcohol use.  On IVF

## 2021-08-22 LAB
ANION GAP SERPL CALC-SCNC: 6 MMOL/L (ref 0–18)
APTT PPP: 137.4 SECONDS (ref 25.4–36.1)
APTT PPP: 26.9 SECONDS (ref 25.4–36.1)
BUN BLD-MCNC: 8 MG/DL (ref 7–18)
CALCIUM BLD-MCNC: 8 MG/DL (ref 8.5–10.1)
CHLORIDE SERPL-SCNC: 118 MMOL/L (ref 98–112)
CO2 SERPL-SCNC: 19 MMOL/L (ref 21–32)
CREAT BLD-MCNC: 0.67 MG/DL
ERYTHROCYTE [DISTWIDTH] IN BLOOD BY AUTOMATED COUNT: 15 %
GLUCOSE BLD-MCNC: 100 MG/DL (ref 70–99)
GLUCOSE BLD-MCNC: 105 MG/DL (ref 70–99)
GLUCOSE BLD-MCNC: 113 MG/DL (ref 70–99)
GLUCOSE BLD-MCNC: 125 MG/DL (ref 70–99)
GLUCOSE BLD-MCNC: 181 MG/DL (ref 70–99)
GLUCOSE BLD-MCNC: 63 MG/DL (ref 70–99)
GLUCOSE BLD-MCNC: 66 MG/DL (ref 70–99)
GLUCOSE BLD-MCNC: 78 MG/DL (ref 70–99)
GLUCOSE BLD-MCNC: 79 MG/DL (ref 70–99)
HAV IGM SER QL: 2.1 MG/DL (ref 1.6–2.6)
HCT VFR BLD AUTO: 37.5 %
HGB BLD-MCNC: 12.3 G/DL
MCH RBC QN AUTO: 34.8 PG (ref 26–34)
MCHC RBC AUTO-ENTMCNC: 32.8 G/DL (ref 31–37)
MCV RBC AUTO: 106.2 FL
OSMOLALITY SERPL CALC.SUM OF ELEC: 295 MOSM/KG (ref 275–295)
PHOSPHATE SERPL-MCNC: 3.1 MG/DL (ref 2.5–4.9)
PLATELET # BLD AUTO: 122 10(3)UL (ref 150–450)
POTASSIUM SERPL-SCNC: 3.5 MMOL/L (ref 3.5–5.1)
RBC # BLD AUTO: 3.53 X10(6)UL
SODIUM SERPL-SCNC: 143 MMOL/L (ref 136–145)
WBC # BLD AUTO: 6.7 X10(3) UL (ref 4–11)

## 2021-08-22 PROCEDURE — 99233 SBSQ HOSP IP/OBS HIGH 50: CPT | Performed by: OTHER

## 2021-08-22 RX ORDER — HEPARIN SODIUM AND DEXTROSE 10000; 5 [USP'U]/100ML; G/100ML
INJECTION INTRAVENOUS CONTINUOUS
Status: DISPENSED | OUTPATIENT
Start: 2021-08-22 | End: 2021-08-29

## 2021-08-22 RX ORDER — HEPARIN SODIUM AND DEXTROSE 10000; 5 [USP'U]/100ML; G/100ML
14 INJECTION INTRAVENOUS ONCE
Status: COMPLETED | OUTPATIENT
Start: 2021-08-22 | End: 2021-08-22

## 2021-08-22 NOTE — PLAN OF CARE
Assumed pt care at 0700  Patient unable to take PO meds d/t ongoing lethargy;  Hosp and neuro aware; hep gtt initiated   Not following commands; opens eyes occasionally   No LLE tremors noted  Bladder scan >400; straight cath x1  K replaced; redraw tomorrow No

## 2021-08-22 NOTE — PROGRESS NOTES
[address] Neurology Progress Note    Jojo Griffin Patient Status:  Inpatient    1965 MRN [id number]   [facility] 7NE-A Attending Jacque Morales MD   [facility] Day # 4 PCP Allan Pascual, [address] [place] [address] abuse (drinks 3-4 beers a day), and stage 3 melanoma (divina receiving chemotherapy), who presented with seizures. Loaded with vimpat, and received Ativan in ED. Transferred from [address] [place] [address] on 8/21.     Per nursing, patient is drowsy this AM and has not bee Assessment/Plan:  · Status epilepticus in setting of known seizure history (prior CVA and ETOH withdrawals with med noncompliance in the past  · MRI negative for acute infarct; showed lateral necrosis to right occipital and posterior temporal lobe  · EEG

## 2021-08-22 NOTE — PROCEDURES
ELECTROENCEPHALOGRAM REPORT    Patient Name: Sandy Resendiz  Chart ID: [id number]  Ordering Physician: Dr Nora Councilman Date: 8/19/2021  Referring Physician:   End Date: 8/21/2021  Patient Diagnosis: Status epilepticus      HISTORY  55year old female electrographic seizures noted    Day 2: Background remain slow 5-7 Hz diffuse theta activity with asymmetric slowing on the right. Right frontal sharp discharges again seen although improved, PLED like and every 3-4 seconds.  No electrographic seizures note

## 2021-08-22 NOTE — PLAN OF CARE
Assumed care at [address] [facility], [address] orientation, does not follow commands, lethargic  Neuro Q4 limited d/t lethargy  Intermittent LLE tremors  IVF infusing  Plan is for IVF, seizure medication  Call light in reach          Problem: Patient/Family Goals  Goal: and self care  Description: INTERVENTIONS  - Monitor swallowing and airway patency with patient fatigue and changes in neurological status  - Encourage and assist patient to increase activity and self care with guidance from PT/OT  - Encourage visually imp

## 2021-08-22 NOTE — PROGRESS NOTES
[facility] Hospitalist Progress Note     PCP: Maddy Anton DO    CC:  Follow up    SUBJECTIVE:  unresponsive,  at bedside. Grimaces to sternal rub.  reports last etoh on Tuesday. Last day more alert was thursday.       OBJECTIVE:  Temp:  [97.3 °F Recent Labs   Lab 08/21/21  1431 08/21/21  1628 08/21/21  1724 08/21/21  2341 08/22/21  0545   PGLU 105* 73 75 125* 105*       Recent Labs   Lab 08/18/21  1453   TROP <0.045          Meds:     • potassium chloride  40 mEq Intravenous Once   • sertral transitioned to IV as not taking PO   -CT, CTA brain noted without acute issue, MRI brain without acute findings.  Continuous EEG per neuro noted  -UA contaminant likely, CXR reviewed personally, no consolidation  -?possibly alcohol related - last drink nig

## 2021-08-23 LAB
AMMONIA PLAS-MCNC: 26 UMOL/L (ref 11–32)
ANION GAP SERPL CALC-SCNC: 8 MMOL/L (ref 0–18)
ANION GAP SERPL CALC-SCNC: 9 MMOL/L (ref 0–18)
APTT PPP: 124.9 SECONDS (ref 25.4–36.1)
APTT PPP: 72.4 SECONDS (ref 25.4–36.1)
APTT PPP: 90.6 SECONDS (ref 25.4–36.1)
BUN BLD-MCNC: 4 MG/DL (ref 7–18)
BUN BLD-MCNC: 6 MG/DL (ref 7–18)
CALCIUM BLD-MCNC: 6.9 MG/DL (ref 8.5–10.1)
CALCIUM BLD-MCNC: 7.9 MG/DL (ref 8.5–10.1)
CHLORIDE SERPL-SCNC: 118 MMOL/L (ref 98–112)
CHLORIDE SERPL-SCNC: 121 MMOL/L (ref 98–112)
CO2 SERPL-SCNC: 15 MMOL/L (ref 21–32)
CO2 SERPL-SCNC: 17 MMOL/L (ref 21–32)
CREAT BLD-MCNC: 0.74 MG/DL
CREAT BLD-MCNC: 1.01 MG/DL
ERYTHROCYTE [DISTWIDTH] IN BLOOD BY AUTOMATED COUNT: 15.5 %
GLUCOSE BLD-MCNC: 101 MG/DL (ref 70–99)
GLUCOSE BLD-MCNC: 102 MG/DL (ref 70–99)
GLUCOSE BLD-MCNC: 104 MG/DL (ref 70–99)
GLUCOSE BLD-MCNC: 104 MG/DL (ref 70–99)
GLUCOSE BLD-MCNC: 106 MG/DL (ref 70–99)
GLUCOSE BLD-MCNC: 113 MG/DL (ref 70–99)
GLUCOSE BLD-MCNC: 118 MG/DL (ref 70–99)
GLUCOSE BLD-MCNC: 119 MG/DL (ref 70–99)
GLUCOSE BLD-MCNC: 135 MG/DL (ref 70–99)
GLUCOSE BLD-MCNC: 179 MG/DL (ref 70–99)
GLUCOSE BLD-MCNC: 467 MG/DL (ref 70–99)
GLUCOSE BLD-MCNC: 468 MG/DL (ref 70–99)
GLUCOSE BLD-MCNC: 478 MG/DL (ref 70–99)
GLUCOSE BLD-MCNC: 69 MG/DL (ref 70–99)
GLUCOSE BLD-MCNC: 70 MG/DL (ref 70–99)
GLUCOSE BLD-MCNC: 76 MG/DL (ref 70–99)
GLUCOSE BLD-MCNC: 77 MG/DL (ref 70–99)
GLUCOSE BLD-MCNC: 87 MG/DL (ref 70–99)
GLUCOSE BLD-MCNC: 95 MG/DL (ref 70–99)
HAV IGM SER QL: 1.9 MG/DL (ref 1.6–2.6)
HCT VFR BLD AUTO: 35.3 %
HGB BLD-MCNC: 11.8 G/DL
MCH RBC QN AUTO: 35.2 PG (ref 26–34)
MCHC RBC AUTO-ENTMCNC: 33.4 G/DL (ref 31–37)
MCV RBC AUTO: 105.4 FL
OSMOLALITY SERPL CALC.SUM OF ELEC: 295 MOSM/KG (ref 275–295)
OSMOLALITY SERPL CALC.SUM OF ELEC: 316 MOSM/KG (ref 275–295)
PHOSPHATE SERPL-MCNC: 2.3 MG/DL (ref 2.5–4.9)
PLATELET # BLD AUTO: 88 10(3)UL (ref 150–450)
POTASSIUM SERPL-SCNC: 3 MMOL/L (ref 3.5–5.1)
POTASSIUM SERPL-SCNC: 3.5 MMOL/L (ref 3.5–5.1)
POTASSIUM SERPL-SCNC: 3.6 MMOL/L (ref 3.5–5.1)
RBC # BLD AUTO: 3.35 X10(6)UL
SODIUM SERPL-SCNC: 144 MMOL/L (ref 136–145)
SODIUM SERPL-SCNC: 144 MMOL/L (ref 136–145)
VALPROATE SERPL-MCNC: 76.9 UG/ML (ref 50–100)
WBC # BLD AUTO: 3.9 X10(3) UL (ref 4–11)

## 2021-08-23 PROCEDURE — 99232 SBSQ HOSP IP/OBS MODERATE 35: CPT | Performed by: NURSE PRACTITIONER

## 2021-08-23 RX ORDER — NYSTATIN 100000 U/G
CREAM TOPICAL 2 TIMES DAILY
Status: DISCONTINUED | OUTPATIENT
Start: 2021-08-23 | End: 2021-08-25

## 2021-08-23 RX ORDER — METOPROLOL TARTRATE 5 MG/5ML
5 INJECTION INTRAVENOUS EVERY 6 HOURS
Status: DISCONTINUED | OUTPATIENT
Start: 2021-08-23 | End: 2021-08-31

## 2021-08-23 RX ORDER — POTASSIUM CHLORIDE 14.9 MG/ML
20 INJECTION INTRAVENOUS ONCE
Status: COMPLETED | OUTPATIENT
Start: 2021-08-23 | End: 2021-08-23

## 2021-08-23 RX ORDER — PREDNISONE 20 MG/1
20 TABLET ORAL
Status: DISCONTINUED | OUTPATIENT
Start: 2021-08-25 | End: 2021-08-25

## 2021-08-23 RX ORDER — DEXTROSE AND SODIUM CHLORIDE 5; .45 G/100ML; G/100ML
INJECTION, SOLUTION INTRAVENOUS CONTINUOUS
Status: DISCONTINUED | OUTPATIENT
Start: 2021-08-23 | End: 2021-08-24

## 2021-08-23 RX ORDER — DEXTROSE MONOHYDRATE 25 G/50ML
INJECTION, SOLUTION INTRAVENOUS
Status: DISPENSED
Start: 2021-08-23 | End: 2021-08-24

## 2021-08-23 RX ORDER — METHYLPREDNISOLONE SODIUM SUCCINATE 40 MG/ML
24 INJECTION, POWDER, LYOPHILIZED, FOR SOLUTION INTRAMUSCULAR; INTRAVENOUS DAILY
Status: COMPLETED | OUTPATIENT
Start: 2021-08-23 | End: 2021-08-24

## 2021-08-23 RX ORDER — PREDNISONE 1 MG/1
5 TABLET ORAL
Status: DISCONTINUED | OUTPATIENT
Start: 2021-08-28 | End: 2021-08-25

## 2021-08-23 RX ORDER — PREDNISONE 10 MG/1
10 TABLET ORAL
Status: DISCONTINUED | OUTPATIENT
Start: 2021-08-27 | End: 2021-08-25

## 2021-08-23 NOTE — PROGRESS NOTES
[facility] Hospitalist Progress Note     PCP: Christopher Suarez DO    CC:  Follow up    SUBJECTIVE:  Grimaces to sternal rub. -120s.  On heparin gtt    OBJECTIVE:  Temp:  [97.8 °F (36.6 °C)-98.5 °F (36.9 °C)] 98.3 °F (36.8 °C)  Pulse:  [] 95  Resp:  [1 --  0.67 1.01 0.74  --    CA 7.7*   < > 7.8*  --  7.9*  --  8.0* 6.9* 7.9*  --    MG 2.1  --  2.0  --  2.3  --  2.1  --  1.9  --    PHOS 3.0  --  2.2*  --  3.3  --  3.1  --  2.3*  --    GLU 66*   < > 108*   < > 75 467* 113* 478*  468* 104*  --     < > = va Chronic ischemic right PCA stroke    Malignant melanoma ([facility]    Primary hypertension    Hypothyroidism    Epilepsia partialis continua ([facility]      54 yo with mmp including but not limited to CAD, HTN/HL, etoh use disorder, cerebrovascular dz with hx CVA, Kartik Arita MD  [facility]  [phone]  8/23/2021  5:13 PM

## 2021-08-23 NOTE — PROGRESS NOTES
[id number] Neurology Progress Note    [facility] Patient Status:  [facility]    1965 MRN [id number]   [facility] 7NE-A Attending Sulaiman Reyes MD   [address] [facility] [address] Day # 5 PCP Myriam Moses DO       Chief Complaint:     Swathi Salinas 8/19/2021 MRI Brain with and without  CONCLUSION:     1.  Sequelae of lateral necrosis in volume loss in the right occipital and posterior temporal lobe is again noted.  FLAIR abnormalities in the white matter likely sequelae of chronic small vessel isc injury ([facility]     Metabolic acidosis     Metabolic alkalosis     Encephalopathy, ALCOHOLIC     Encephalopathy, HEPATIC     Left arm weakness     Memory loss     Seizure disorder (HCC)     Symptomatic localization-related epilepsy ([facility]     ETOH abuse     Al

## 2021-08-23 NOTE — PLAN OF CARE
With draws to pain  Occasional spontaneous movement   Mild fine tremors intermt. To BLE  IV medications only at this time  Vitals remain stable  Continues to retain urine, chan catheter placed   Potssium last night 3.0 40 +20 given to replace.  Potassium d

## 2021-08-23 NOTE — PLAN OF CARE
Assumed care at [address]. Alert and oriented x0, very lethargic. Telemetry monitor reading SR/ST. IVF changed per MD. Potassium replacement given. POC glucose running low with Dr. Cyndi De León notified and updated throughout night.  Fall and seizure precautions main patient/family to call for assistance with activity based on assessment  Outcome: Progressing  Goal: Achieves maximal functionality and self care  Description: INTERVENTIONS  - Monitor swallowing and airway patency with patient fatigue and changes in neuro

## 2021-08-24 ENCOUNTER — NURSE ONLY (OUTPATIENT)
Dept: ELECTROPHYSIOLOGY | Facility: HOSPITAL | Age: 56
DRG: 101 | End: 2021-08-24
Attending: NURSE PRACTITIONER
Payer: COMMERCIAL

## 2021-08-24 LAB
ANION GAP SERPL CALC-SCNC: 7 MMOL/L (ref 0–18)
APTT PPP: 145.9 SECONDS (ref 25.4–36.1)
APTT PPP: 61.5 SECONDS (ref 25.4–36.1)
APTT PPP: 70.5 SECONDS (ref 25.4–36.1)
APTT PPP: 99.2 SECONDS (ref 25.4–36.1)
BUN BLD-MCNC: 3 MG/DL (ref 7–18)
CALCIUM BLD-MCNC: 7.8 MG/DL (ref 8.5–10.1)
CHLORIDE SERPL-SCNC: 112 MMOL/L (ref 98–112)
CO2 SERPL-SCNC: 19 MMOL/L (ref 21–32)
CREAT BLD-MCNC: 0.66 MG/DL
ERYTHROCYTE [DISTWIDTH] IN BLOOD BY AUTOMATED COUNT: 15.3 %
GLUCOSE BLD-MCNC: 105 MG/DL (ref 70–99)
GLUCOSE BLD-MCNC: 106 MG/DL (ref 70–99)
GLUCOSE BLD-MCNC: 109 MG/DL (ref 70–99)
GLUCOSE BLD-MCNC: 120 MG/DL (ref 70–99)
GLUCOSE BLD-MCNC: 123 MG/DL (ref 70–99)
GLUCOSE BLD-MCNC: 63 MG/DL (ref 70–99)
GLUCOSE BLD-MCNC: 68 MG/DL (ref 70–99)
GLUCOSE BLD-MCNC: 82 MG/DL (ref 70–99)
GLUCOSE BLD-MCNC: 84 MG/DL (ref 70–99)
GLUCOSE BLD-MCNC: 89 MG/DL (ref 70–99)
GLUCOSE BLD-MCNC: 93 MG/DL (ref 70–99)
GLUCOSE BLD-MCNC: 94 MG/DL (ref 70–99)
HAV IGM SER QL: 1.8 MG/DL (ref 1.6–2.6)
HCT VFR BLD AUTO: 37.4 %
HGB BLD-MCNC: 11.7 G/DL
MCH RBC QN AUTO: 34.1 PG (ref 26–34)
MCHC RBC AUTO-ENTMCNC: 31.3 G/DL (ref 31–37)
MCV RBC AUTO: 109 FL
OSMOLALITY SERPL CALC.SUM OF ELEC: 283 MOSM/KG (ref 275–295)
PHOSPHATE SERPL-MCNC: 2.6 MG/DL (ref 2.5–4.9)
PLATELET # BLD AUTO: 92 10(3)UL (ref 150–450)
POTASSIUM SERPL-SCNC: 3.3 MMOL/L (ref 3.5–5.1)
RBC # BLD AUTO: 3.43 X10(6)UL
SODIUM SERPL-SCNC: 138 MMOL/L (ref 136–145)
VIT B12 SERPL-MCNC: 1151 PG/ML (ref 193–986)
WBC # BLD AUTO: 4.4 X10(3) UL (ref 4–11)

## 2021-08-24 PROCEDURE — 82962 GLUCOSE BLOOD TEST: CPT

## 2021-08-24 PROCEDURE — 95822 EEG COMA OR SLEEP ONLY: CPT | Performed by: HOSPITALIST

## 2021-08-24 RX ORDER — MAGNESIUM SULFATE HEPTAHYDRATE 40 MG/ML
2 INJECTION, SOLUTION INTRAVENOUS ONCE
Status: COMPLETED | OUTPATIENT
Start: 2021-08-24 | End: 2021-08-24

## 2021-08-24 NOTE — PROGRESS NOTES
[id number] [facility] Neurology Progress Note    Jojo Griffin Patient Status:  Inpatient    1965 MRN [id number]   [facility] 7NE-A Attending Jacque Morales MD   [address] Day # 6 PCP Allan Pascual DO       Chief Complaint:     Angela Revel consistent with focal status epilepticus.      8/19/2021 MRI Brain with and without  CONCLUSION:     1.  Sequelae of lateral necrosis in volume loss in the right occipital and posterior temporal lobe is again noted.  FLAIR abnormalities in the white matter Metabolic alkalosis     Encephalopathy, ALCOHOLIC     Encephalopathy, HEPATIC     Left arm weakness     Memory loss     Seizure disorder (HCC)     Symptomatic localization-related epilepsy (HCC)     ETOH abuse     Alcohol withdrawal delirium, acute, hypoac

## 2021-08-24 NOTE — PLAN OF CARE
Assumed care at [address]. Alert and oriented x0. Telemetry monitor reading SR. IVF infusing assessed and maintained. K+ replaced redraw this am. Neuro remain unchanged. Fall and seizure precautions maintained per protocol. Plan for discuss nutrition options.  C assistance with activity based on assessment  Outcome: Progressing  Goal: Achieves maximal functionality and self care  Description: INTERVENTIONS  - Monitor swallowing and airway patency with patient fatigue and changes in neurological status  - Encourage

## 2021-08-24 NOTE — PROCEDURES
[facility] - ELECTROENCEPHALOGRAM (EEG) REPORT  Patient Name:  Brandt Boykin   MRN / CSN:  [id number] / [id number]   Date of Birth / Age:  5/5/1965 /  55year old   Encounter Date:  8/24/2021         METHODS:  Twenty-two electrodes were applied according to th Findings are consistent with a diffuse and nonspecific encephalopathy characterized by  background slowing and minimal variability. No clear seizures or seizure tendency captured. Of note,  A routine EEG cannot fully exclude the possibility for epilepsy.

## 2021-08-24 NOTE — DIETARY NOTE
[facility]    NUTRITION ASSESSMENT    Pt does not meet malnutrition criteria. NUTRITION INTERVENTION:     1. Meal and Snacks - as deemed safe and appropriate by MD / SLP  2.  Enteral Nutrition - Preference to use GI tract given functioning GI system thiamine for potential Wernicke's. Thiamine added to tonight's TPN. ANTHROPOMETRICS:  Ht:  5'2\"  Wt: 56.3 kg (124 lb 1.9 oz). This is 113% of IBW  BMI: Body mass index is 22.7 kg/m².   IBW: 50 kg      WEIGHT HISTORY:   Patient Weight(s) for the past DIAGNOSIS/PROBLEM:  Inadequate oral intake related to physiological causes and inability to take or tolerate as evidenced by documented/reported insufficient oral intake and need for NPO status      MONITOR AND EVALUATE/NUTRITION GOALS:  1.  Weight stable w

## 2021-08-24 NOTE — PAYOR COMM NOTE
CONTINUED STAY REVIEW    Payor: Arianne Laughlin  #:  [id number]  Authorization Number: [id number]    Admit date: 8/18/21  Admit time:  1:59 PM    Admitting Physician: Amaury Agarwal MD  Attending Physician:  Elizabeth Sapp MD [id number] UNIT/GM cream     Date Action Dose Route User    8/24/2021 0820 Given (none) Topical Loyde KJ Marie    8/24/2021 0021 Given (none) Topical Randolm KJ Chou    8/23/2021 1330 Given (none) Topical Loyde Frank, KJ      potassium chloride IVPB premix 08/23/2021               Imaging/Diagnostic:     EEG 8/22:  IMPRESSION  This is an abnormal continuous video EEG recording due to presence of periodic right frontal central sharp discharges and right frontal slowing suggestive of epileptic activity in that clonic)- then question of LLE twitching and possible weakness     **seizure, continuous partial seizure  **LLE twitching and LE weakness  **Lethargy, AMS  -reportedly complaint with AEDs  AEDs per neuro, transitioned to IV as not taking PO   -CT, CTA brain developed, well nourished   HEENT: Mucous membranes moist  Skin: warm,dry     Neurologic:   Will open eyes slightly longer today to her name, no gaze deviation noted, PERRL 3 mm sluggish, does not follow commands, moans to noxious stimuli, will pull away i review that would be consistent with a seizure.     IMPRESSION:  This is an abnormal routine EEG. Findings are consistent with a diffuse and nonspecific encephalopathy characterized by  background slowing and minimal variability.   No clear seizures or sei

## 2021-08-24 NOTE — PLAN OF CARE
Continues to have hypoglycemic episodes requiring treatment of dextrose.    Jesus@[employer] to help maintain glucose  IV steroids started yesterday  Monitoring electrolytes  Heparin gtt per neuro protocol  Seizure medications IV  Remains NPO  Mitt restraints   P

## 2021-08-24 NOTE — PROGRESS NOTES
[facility] Hospitalist Progress Note     PCP: Leland Mckenna DO    CC:  Follow up    SUBJECTIVE:  Laying in bed, not responding to voice.      OBJECTIVE:  Temp:  [97.7 °F (36.5 °C)-99.3 °F (37.4 °C)] 97.9 °F (36.6 °C)  Pulse:  [78-96] 78  Resp:  [14-18] 16  BP: --  0.66   CA 7.8*   < > 7.9*  --   --  8.0* 6.9* 7.9*  --  7.8*   MG 2.0  --  2.3  --   --  2.1  --  1.9  --  1.8   PHOS 2.2*  --  3.3  --   --  3.1  --  2.3*  --  2.6   *   < > 75   < > 467* 113* 478*  468* 104*  --  106*    < > = values in this i HTN/HL, etoh use disorder, cerebrovascular dz with hx CVA, seizure disorder, melanoma on immunotherapy, is admitted for seizure (tonic clonic)- then question of LLE twitching and possible weakness     **seizure, continuous partial seizure  **LLE twitching start TPN given lack of nutrition and persistent hypoglycemia     **PPx-scds, IV heparin      [facility] AM, will discuss plan with them    MD Alexis Tierney  [phone]  8/24/2021  3:08 PM

## 2021-08-25 LAB
ALBUMIN SERPL-MCNC: 2 G/DL (ref 3.4–5)
ALBUMIN/GLOB SERPL: 0.5 {RATIO} (ref 1–2)
ALP LIVER SERPL-CCNC: 121 U/L
ALT SERPL-CCNC: 41 U/L
ANION GAP SERPL CALC-SCNC: 7 MMOL/L (ref 0–18)
APTT PPP: 42.3 SECONDS (ref 25.4–36.1)
APTT PPP: 62.6 SECONDS (ref 25.4–36.1)
APTT PPP: 64.3 SECONDS (ref 25.4–36.1)
AST SERPL-CCNC: 62 U/L (ref 15–37)
BILIRUB SERPL-MCNC: 0.9 MG/DL (ref 0.1–2)
BUN BLD-MCNC: 3 MG/DL (ref 7–18)
CALCIUM BLD-MCNC: 7.7 MG/DL (ref 8.5–10.1)
CHLORIDE SERPL-SCNC: 112 MMOL/L (ref 98–112)
CO2 SERPL-SCNC: 19 MMOL/L (ref 21–32)
CREAT BLD-MCNC: 0.84 MG/DL
ERYTHROCYTE [DISTWIDTH] IN BLOOD BY AUTOMATED COUNT: 15 %
GLOBULIN PLAS-MCNC: 4 G/DL (ref 2.8–4.4)
GLUCOSE BLD-MCNC: 112 MG/DL (ref 70–99)
GLUCOSE BLD-MCNC: 118 MG/DL (ref 70–99)
GLUCOSE BLD-MCNC: 136 MG/DL (ref 70–99)
GLUCOSE BLD-MCNC: 221 MG/DL (ref 70–99)
GLUCOSE BLD-MCNC: 67 MG/DL (ref 70–99)
GLUCOSE BLD-MCNC: 67 MG/DL (ref 70–99)
GLUCOSE BLD-MCNC: 80 MG/DL (ref 70–99)
GLUCOSE BLD-MCNC: 97 MG/DL (ref 70–99)
HAV IGM SER QL: 2.1 MG/DL (ref 1.6–2.6)
HCT VFR BLD AUTO: 40.3 %
HGB BLD-MCNC: 13.2 G/DL
LEVETIRACETAM (KEPPRA): 43 UG/ML
M PROTEIN MFR SERPL ELPH: 6 G/DL (ref 6.4–8.2)
MCH RBC QN AUTO: 35.4 PG (ref 26–34)
MCHC RBC AUTO-ENTMCNC: 32.8 G/DL (ref 31–37)
MCV RBC AUTO: 108 FL
OSMOLALITY SERPL CALC.SUM OF ELEC: 282 MOSM/KG (ref 275–295)
PHOSPHATE SERPL-MCNC: 2.4 MG/DL (ref 2.5–4.9)
PLATELET # BLD AUTO: 85 10(3)UL (ref 150–450)
POTASSIUM SERPL-SCNC: 3.4 MMOL/L (ref 3.5–5.1)
RBC # BLD AUTO: 3.73 X10(6)UL
SODIUM SERPL-SCNC: 138 MMOL/L (ref 136–145)
TRIGL SERPL-MCNC: 138 MG/DL (ref 30–149)
WBC # BLD AUTO: 7.1 X10(3) UL (ref 4–11)

## 2021-08-25 PROCEDURE — 02HV33Z INSERTION OF INFUSION DEVICE INTO SUPERIOR VENA CAVA, PERCUTANEOUS APPROACH: ICD-10-PCS | Performed by: INTERNAL MEDICINE

## 2021-08-25 PROCEDURE — 99232 SBSQ HOSP IP/OBS MODERATE 35: CPT | Performed by: NURSE PRACTITIONER

## 2021-08-25 PROCEDURE — B5181ZA FLUOROSCOPY OF SUPERIOR VENA CAVA USING LOW OSMOLAR CONTRAST, GUIDANCE: ICD-10-PCS | Performed by: INTERNAL MEDICINE

## 2021-08-25 RX ORDER — METHYLPREDNISOLONE SODIUM SUCCINATE 40 MG/ML
16 INJECTION, POWDER, LYOPHILIZED, FOR SOLUTION INTRAMUSCULAR; INTRAVENOUS DAILY
Status: COMPLETED | OUTPATIENT
Start: 2021-08-25 | End: 2021-08-26

## 2021-08-25 RX ORDER — PREDNISONE 10 MG/1
10 TABLET ORAL
Status: ACTIVE | OUTPATIENT
Start: 2021-08-27 | End: 2021-08-28

## 2021-08-25 RX ORDER — LIDOCAINE HYDROCHLORIDE 10 MG/ML
5 INJECTION, SOLUTION EPIDURAL; INFILTRATION; INTRACAUDAL; PERINEURAL ONCE AS NEEDED
Status: COMPLETED | OUTPATIENT
Start: 2021-08-25 | End: 2021-08-25

## 2021-08-25 RX ORDER — POTASSIUM CHLORIDE 14.9 MG/ML
20 INJECTION INTRAVENOUS ONCE
Status: COMPLETED | OUTPATIENT
Start: 2021-08-25 | End: 2021-08-25

## 2021-08-25 RX ORDER — DEXTROSE AND SODIUM CHLORIDE 5; .9 G/100ML; G/100ML
INJECTION, SOLUTION INTRAVENOUS CONTINUOUS
Status: DISCONTINUED | OUTPATIENT
Start: 2021-08-25 | End: 2021-08-28

## 2021-08-25 RX ORDER — PREDNISONE 1 MG/1
5 TABLET ORAL
Status: ACTIVE | OUTPATIENT
Start: 2021-08-28 | End: 2021-08-29

## 2021-08-25 NOTE — PROGRESS NOTES
[facility] Hospitalist Progress Note     PCP: Taj Francis DO    CC:  Follow up    SUBJECTIVE:  Laying in bed,  at bedside. Wakes up and able to answer questions with short answers.        OBJECTIVE:  Temp:  [97.8 °F (36.6 °C)-100 °F (37.8 °C)] 100 °F ( --   --  2.3*  --  2.6 2.4*   GLU 75   < > 113*  --  478*  468* 104*  --  106* 80    < > = values in this interval not displayed.        Recent Labs   Lab 08/25/21  0522   ALT 41   AST 62*   ALB 2.0*       Recent Labs   Lab 08/25/21  0424 08/25/21  0617 08/ immunotherapy, is admitted for seizure (tonic clonic)- then question of LLE twitching and possible weakness     **seizure, continuous partial seizure- much improved  **LLE twitching (resolved) and LE weakness  **Lethargy, AMS -improving  -reportedly compli of nutrition and persistent hypoglycemia     **PPx-scds, IV heparin        Thank You,  Kathy Flores MD    [facility] Hospitalist  Answering Service number: [phone]

## 2021-08-25 NOTE — PLAN OF CARE
Assumed pt care at [address] [place] [address] for the night shift. Pt sleeping, opens eyes briefly. Able to state her name only. Not responding to any other orientation questions and   is not fallowing commands. VSS. Afebrile. NSR/ST on tele. Heparin gtt infusing at 3ml/hr.   Rem ordered  - Monitor neurological status  8/25/2021 0300 by Saint Easterly, RN  Outcome: Progressing  8/25/2021 0259 by Saint Easterly, RN  Outcome: Progressing     Problem: Safety Risk - Non-Violent Restraints  Goal: Patient will remain free from self-h

## 2021-08-25 NOTE — DIETARY NOTE
Neo 14 FOLLOW UP    Sara Began Naomyomokos     TPN order for tonight to provide: 500 dextrose calories, 330 lipid calories, 30% lipids, 55 grams protein in 1800mL fluid to meet ~ 75% of nutrition prescription.      Intake/Out

## 2021-08-25 NOTE — PROGRESS NOTES
[id number] [facility] Neurology Progress Note    Casper Bimmarycruz Patient Status:  Inpatient    1965 MRN [id number]   [facility] 7NE-A Attending Myriam Nieves, *   Hosp Day # 7 PCP Venkat Tellez DO       Chief Complaint: Date    TRIG 138 08/25/2021           Imaging/Diagnostic:    EEG 8/24: IMPRESSION:  This is an abnormal routine EEG. Findings are consistent with a diffuse and nonspecific encephalopathy characterized by  background slowing and minimal variability.   No cl cerebral infarction (HCC)     Thrombocytopenia (HCC)     Intractable abdominal pain     Acute left-sided weakness     Hypokalemia     Chronic systolic heart failure (HCC)     Dyslipidemia     Arterial hypotension     Hypoalbuminemia due to protein-calorie

## 2021-08-26 ENCOUNTER — APPOINTMENT (OUTPATIENT)
Dept: ULTRASOUND IMAGING | Facility: HOSPITAL | Age: 56
DRG: 101 | End: 2021-08-26
Attending: HOSPITALIST
Payer: COMMERCIAL

## 2021-08-26 LAB
ALBUMIN SERPL-MCNC: 2 G/DL (ref 3.4–5)
ALBUMIN/GLOB SERPL: 0.5 {RATIO} (ref 1–2)
ALP LIVER SERPL-CCNC: 102 U/L
ALT SERPL-CCNC: 34 U/L
ANION GAP SERPL CALC-SCNC: 8 MMOL/L (ref 0–18)
APTT PPP: 65.8 SECONDS (ref 25.4–36.1)
AST SERPL-CCNC: 47 U/L (ref 15–37)
BILIRUB SERPL-MCNC: 0.8 MG/DL (ref 0.1–2)
BUN BLD-MCNC: 4 MG/DL (ref 7–18)
CALCIUM BLD-MCNC: 7.8 MG/DL (ref 8.5–10.1)
CHLORIDE SERPL-SCNC: 109 MMOL/L (ref 98–112)
CO2 SERPL-SCNC: 21 MMOL/L (ref 21–32)
CREAT BLD-MCNC: 0.45 MG/DL
ERYTHROCYTE [DISTWIDTH] IN BLOOD BY AUTOMATED COUNT: 14.8 %
GLOBULIN PLAS-MCNC: 3.7 G/DL (ref 2.8–4.4)
GLUCOSE BLD-MCNC: 109 MG/DL (ref 70–99)
GLUCOSE BLD-MCNC: 134 MG/DL (ref 70–99)
GLUCOSE BLD-MCNC: 79 MG/DL (ref 70–99)
GLUCOSE BLD-MCNC: 89 MG/DL (ref 70–99)
HAV IGM SER QL: 2 MG/DL (ref 1.6–2.6)
HCT VFR BLD AUTO: 34.3 %
HGB BLD-MCNC: 11.5 G/DL
LACOSAMIDE, SERUM OR PLASMA: 16 UG/ML
M PROTEIN MFR SERPL ELPH: 5.7 G/DL (ref 6.4–8.2)
MCH RBC QN AUTO: 34.4 PG (ref 26–34)
MCHC RBC AUTO-ENTMCNC: 33.5 G/DL (ref 31–37)
MCV RBC AUTO: 102.7 FL
OSMOLALITY SERPL CALC.SUM OF ELEC: 282 MOSM/KG (ref 275–295)
PHOSPHATE SERPL-MCNC: 3.4 MG/DL (ref 2.5–4.9)
PLATELET # BLD AUTO: 73 10(3)UL (ref 150–450)
POTASSIUM SERPL-SCNC: 3.6 MMOL/L (ref 3.5–5.1)
RBC # BLD AUTO: 3.34 X10(6)UL
SODIUM SERPL-SCNC: 138 MMOL/L (ref 136–145)
WBC # BLD AUTO: 4.2 X10(3) UL (ref 4–11)

## 2021-08-26 PROCEDURE — 99231 SBSQ HOSP IP/OBS SF/LOW 25: CPT | Performed by: PHYSICIAN ASSISTANT

## 2021-08-26 PROCEDURE — 93971 EXTREMITY STUDY: CPT | Performed by: HOSPITALIST

## 2021-08-26 NOTE — DIETARY NOTE
[address] FOLLOW UP    Dmitry Anderson     TPN order for tonight to provide: 770 dextrose calories, 450 lipid calories, 30% lipids, 70 grams protein in 1800mL fluid to meet ~ 100% of nutrition prescription.      Intake/Ou

## 2021-08-26 NOTE — PROGRESS NOTES
[facility] Hospitalist Progress Note     PCP: Mariela Horta DO    CC:  Follow up    SUBJECTIVE:  Laying in bed, alert. AxO x 2-3.  No complaints    OBJECTIVE:  Temp:  [98.1 °F (36.7 °C)-99.9 °F (37.7 °C)] 98.3 °F (36.8 °C)  Pulse:  [] 98  Resp:  [17-20] 1 --  106* 80 89    < > = values in this interval not displayed.        Recent Labs   Lab 08/25/21  0522 08/26/21  0503   ALT 41 34   AST 62* 47*   ALB 2.0* 2.0*       Recent Labs   Lab 08/25/21  1639 08/25/21  2205 08/25/21  2346 08/26/21  0501 08/26/21  121 continuous partial seizure- much improved  **LLE twitching (resolved) and LE weakness  **Lethargy, AMS -improving  -reportedly compliant with AEDs  AEDs per neuro, transitioned to IV as not taking PO   -CT, CTA brain noted without acute issue, MRI brain wi Severa Spinner, MD    [facility] Hospitalist  Answering Service number: [phone]

## 2021-08-26 NOTE — PLAN OF CARE
Assumed care 1900  Patient awake, drowsy at times, confused  Bilateral mitts in place   in place  Heparin running  Seizure precautions maintained  Fall precautions maintained  Video monitoring in place  Strong in all extremities- neuro checks complete

## 2021-08-26 NOTE — PLAN OF CARE
Assumed care at 0700  Patient alert for most of day, drowsy at times  Oriented to self, place, and time.  Can be confused at times  Ultrasound R arm completed  IV fluids infusing  TPN infusing through PICC  Potassium replaced per protocol  Heparin drip infu

## 2021-08-26 NOTE — PROGRESS NOTES
[id number] [facility] Neurology Progress Note    Benny Susana Patient Status:  Inpatient    1965 MRN [id number]   [facility] 7NE-A Attending Jean Valdez, *   Hosp Day # 8 PCP Yin Sotelo DO     CC: seizures    Sub (DEPACON) 500 mg in sodium chloride 0.9% 100 mL IVPB, 500 mg, Intravenous, Q8H  Levothyroxine Sodium tab 25 mcg, 25 mcg, Oral, Before breakfast  levETIRAcetam (KEPPRA) 1,500 mg in sodium chloride 0.9% 100 mL IVPB, 1,500 mg, Intravenous, Q12H  thiamine (Vit captured.  Of note,  A routine EEG cannot fully exclude the possibility for epilepsy.     EEG 8/22/21:  IMPRESSION  This is an abnormal continuous video EEG recording due to presence of periodic right frontal central sharp discharges and right frontal slowi

## 2021-08-27 LAB
ALBUMIN SERPL-MCNC: 1.7 G/DL (ref 3.4–5)
ALBUMIN/GLOB SERPL: 0.5 {RATIO} (ref 1–2)
ALP LIVER SERPL-CCNC: 72 U/L
ALT SERPL-CCNC: 23 U/L
ANION GAP SERPL CALC-SCNC: 8 MMOL/L (ref 0–18)
APTT PPP: 65.2 SECONDS (ref 25.4–36.1)
AST SERPL-CCNC: 39 U/L (ref 15–37)
BILIRUB SERPL-MCNC: 0.6 MG/DL (ref 0.1–2)
BUN BLD-MCNC: 6 MG/DL (ref 7–18)
C DIFF TOX B STL QL: NEGATIVE
CALCIUM BLD-MCNC: 7.1 MG/DL (ref 8.5–10.1)
CHLORIDE SERPL-SCNC: 110 MMOL/L (ref 98–112)
CO2 SERPL-SCNC: 22 MMOL/L (ref 21–32)
CREAT BLD-MCNC: 0.38 MG/DL
ERYTHROCYTE [DISTWIDTH] IN BLOOD BY AUTOMATED COUNT: 15 %
GLOBULIN PLAS-MCNC: 3.1 G/DL (ref 2.8–4.4)
GLUCOSE BLD-MCNC: 100 MG/DL (ref 70–99)
GLUCOSE BLD-MCNC: 108 MG/DL (ref 70–99)
GLUCOSE BLD-MCNC: 115 MG/DL (ref 70–99)
GLUCOSE BLD-MCNC: 126 MG/DL (ref 70–99)
HAV IGM SER QL: 1.7 MG/DL (ref 1.6–2.6)
HCT VFR BLD AUTO: 28.2 %
HGB BLD-MCNC: 10.3 G/DL
HGB BLD-MCNC: 9.6 G/DL
M PROTEIN MFR SERPL ELPH: 4.8 G/DL (ref 6.4–8.2)
MCH RBC QN AUTO: 35.3 PG (ref 26–34)
MCHC RBC AUTO-ENTMCNC: 34 G/DL (ref 31–37)
MCV RBC AUTO: 103.7 FL
OSMOLALITY SERPL CALC.SUM OF ELEC: 288 MOSM/KG (ref 275–295)
PHOSPHATE SERPL-MCNC: 3.2 MG/DL (ref 2.5–4.9)
PLATELET # BLD AUTO: 50 10(3)UL (ref 150–450)
POTASSIUM SERPL-SCNC: 3.7 MMOL/L (ref 3.5–5.1)
RBC # BLD AUTO: 2.72 X10(6)UL
SODIUM SERPL-SCNC: 140 MMOL/L (ref 136–145)
WBC # BLD AUTO: 3.9 X10(3) UL (ref 4–11)

## 2021-08-27 PROCEDURE — 99233 SBSQ HOSP IP/OBS HIGH 50: CPT | Performed by: OTHER

## 2021-08-27 RX ORDER — POTASSIUM CHLORIDE 14.9 MG/ML
20 INJECTION INTRAVENOUS ONCE
Status: COMPLETED | OUTPATIENT
Start: 2021-08-27 | End: 2021-08-27

## 2021-08-27 RX ORDER — MORPHINE SULFATE 2 MG/ML
2 INJECTION, SOLUTION INTRAMUSCULAR; INTRAVENOUS ONCE
Status: DISCONTINUED | OUTPATIENT
Start: 2021-08-27 | End: 2021-09-07

## 2021-08-27 RX ORDER — MAGNESIUM SULFATE HEPTAHYDRATE 40 MG/ML
2 INJECTION, SOLUTION INTRAVENOUS ONCE
Status: COMPLETED | OUTPATIENT
Start: 2021-08-27 | End: 2021-08-27

## 2021-08-27 NOTE — PLAN OF CARE
Assumed care at [address]  A&Ox1-3, cheryl & kristie  Neuro Q4, follows some commands  On RA, SR/ST on tele  TPN infusing, heparin gtt infusing  Seizure precautions in place  Plan is for PT/OT to see tomorrow  Call light in reach    Pt having multiple liquid green on all 4 side rails  - Instruct patient/family to notify RN of any seizure activity  - Instruct patient/family to call for assistance with activity based on assessment  Outcome: Progressing  Goal: Achieves maximal functionality and self care  Description:

## 2021-08-27 NOTE — SLP NOTE
ADULT SWALLOWING EVALUATION    ASSESSMENT    ASSESSMENT/OVERALL IMPRESSION:  Order received for swallowing evaluation per protocol. Patient admitted to the hospital after seizing. Patient his a history of stroke and seizures.  Patient recently started on TP Plan/Recommendations: SLP to reassess; Dysphagia therapy  Discharge Recommendations/Plan: Undetermined    HISTORY   MEDICAL HISTORY  Reason for Referral: Stroke protocol    Problem List  Principal Problem:    Status epilepticus ([facility]  Active Problems:    Ch (unable to assess)  Symmetry: Unable to assess  Strength: Unable to assess  Tone: Unable to assess  Range of Motion: Unable to assess  Rate of Motion: Unable to assess    Voice Quality: Clear  Respiratory Status: Unlabored  Consistencies Trialed: Nectar th

## 2021-08-27 NOTE — PHYSICAL THERAPY NOTE
PHYSICAL THERAPY EVALUATION - INPATIENT     Room Number: 1153/1042-B  Evaluation Date: 8/27/2021  Type of Evaluation: Initial  Physician Order: PT Eval and Treat    Presenting Problem: status epilepticus   Reason for Therapy: Mobility Dysfunction and stroke    Malign[facility]    Primary hypertension    Hypothyroidism    Epilepsia partialis continua [facility])      Past Medical History  Past Medical History:   Diagnosis Date   • Cardiomyopathy ([facility] 11/13    ?etoh   • CORONARY ARTERY DISEASE    • Lula Majano than 40% of the time, reponded well to visual targets  · Awareness of Errors:  assistance required to identify errors made, assistance required to correct errors made and decreased awareness of errors   · Awareness of Deficits:  decreased awareness of defi Assistance: Not tested       Skilled Therapy Provided:Per RN okay to work with pt. Pt received in supine and was agreeable to therapy session with encouragement. Pt educated on role of therapy, goals for the session, and discharge planning.  Pt needed cues patient in returning to prior to level of function. DISCHARGE RECOMMENDATIONS  PT Discharge Recommendations: Sub-acute rehabilitation (ELOS 16-18 days)    PLAN  PT Treatment Plan: Bed mobility; Endurance; Energy conservation;Patient education; Family educati

## 2021-08-27 NOTE — DIETARY NOTE
[facility]    NUTRITION ASSESSMENT    Pt does not meet malnutrition criteria. NUTRITION INTERVENTION:     1. Meal and Snacks - as deemed safe and appropriate by MD / SLP  2.  Enteral Nutrition - Preference to use GI tract given functioning GI system hx of ETOH recommend high dose thiamine for potential Wernicke's. Thiamine added to tonight's TPN. ANTHROPOMETRICS:  Ht:  5'2\"  Wt: 56.3 kg (124 lb 1.9 oz). This is 113% of IBW  BMI: Body mass index is 22.7 kg/m².   IBW: 50 kg      WEIGHT HISTORY: PRESCRIPTION:   Calories: 5580-9569 calories/day (25-30 calories per kg)  Protein: 68-84 grams protein/day (1.2-1.5 grams protein per kg)  Fluid: ~1 ml/kcal or per MD discretion    NUTRITION DIAGNOSIS/PROBLEM:  Inadequate oral intake related to physiologic

## 2021-08-27 NOTE — PROGRESS NOTES
[id number] [facility] Neurology Progress Note    Clarecamilo Pratik Patient Status:  Inpatient    1965 MRN [id number]   [facility] 7NE-A Attending Dena Alex, *   Hosp Day # 9 PCP Namrata Joshi DO     CC: seizures    Sub oral liquid 30 g, 30 g, Oral, Q15 Min PRN   Or  glucose-vitamin C (DEX-4) chewable tab 8 tablet, 8 tablet, Oral, Q15 Min PRN  Valproate Sodium (DEPACON) 500 mg in sodium chloride 0.9% 100 mL IVPB, 500 mg, Intravenous, Q8H  levETIRAcetam (KEPPRA) 1,500 mg i recording due to presence of periodic right frontal central sharp discharges and right frontal slowing suggestive of epileptic activity in that region and given clinical presentation findings are consistent with focal status epilepticus.      MRI Brain wit She is more awake this afternoon. Still has some intermittent LLE jerking or shaking but this in not rhythmic and she falls asleep during the exam and this abates. May not be focal seizure.      P:  Seizure precautions   Continue Keppra 1500 mg BID  Vimpat

## 2021-08-27 NOTE — PAYOR COMM NOTE
--------------  CONTINUED STAY REVIEW    Payor: Arianne Laughlin  #:  [id number]  Authorization Number: [id number]    Admit date: 8/18/21  Admit time:  1:59 PM    Admitting Physician: Pau Villegas MD  Attending Physician:  Miguel Contreras Bilirubin, Total 0.6 mg/dL     Total Protein 4.8Low  g/dL     Albumin 1.7Low  g/dL     Globulin  3.1 g/dL     A/G Ratio 0.5Low    Magnesium [[id number]] (Normal) Collected: 08/27/21 0545   Specimen: Blood Updated: 08/27/21 0619    Magnesium 1.7 mg/dL    PTT mg/dL    PTT, Activated [[id number]] (Abnormal) Collected: 08/26/21 0503   Specimen: Blood Updated: 08/26/21 0604    PTT 65.8High  seconds    CBC, Platelet;  No Differential [[id number]] (Abnormal) Collected: 08/26/21 0503   Specimen: Blood Updated: 08/26/21 08/25/21 0522   Specimen: Blood Updated: 08/25/21 0550    Triglycerides 138 mg/dL    Magnesium [[id number]] (Normal) Collected: 08/25/21 0522   Specimen: Blood Updated: 08/25/21 0550    Magnesium 2.1 mg/dL    Phosphorus [[id number]] (Abnormal) Collected: 08/ Action Dose Route User    8/26/2021 2022 New Bag 150 mg Intravenous Severino Hernandez RN    8/26/2021 1771 New Bag 150 mg Intravenous Jordyn De Los Santos RN      levETIRAcetam (KEPPRA) 1,500 mg in sodium chloride 0.9% 100 mL IVPB     Date Action Dose Route User weakness     **seizure, continuous partial seizure  **LLE twitching and LE weakness  **Lethargy, AMS  -reportedly complaint with AEDs  AEDs per neuro, transitioned to IV as not taking PO   -CT, CTA brain noted without acute issue, MRI brain without acute f last drink night PTA reportedly (ciwa-see below)  -UDS noted. Rapid covid neg. keppra level noted. Alcohol level <3  - ammonia 26  -b12/keppra levels per neuro     **acute anemia, thrombocytopenia- suspect reactive, monitor. Possible d/t alcohol use.  Was O

## 2021-08-27 NOTE — PROGRESS NOTES
[facility] Hospitalist Progress Note     PCP: Fanny Mejia DO    CC:  Follow up    SUBJECTIVE:  Asleep.  and RN at bedside.      OBJECTIVE:  Temp:  [97.7 °F (36.5 °C)-100.8 °F (38.2 °C)] 99.7 °F (37.6 °C)  Pulse:  [] 114  Resp:  [14-22] 18  BP: (1 AST 62* 47* 39*   ALB 2.0* 2.0* 1.7*       Recent Labs   Lab 08/26/21  0501 08/26/21  1212 08/26/21  1754 08/27/21  0003 08/27/21  0554   PGLU 79 134* 109* 115* 108*          Meds:     • morphINE sulfate  2 mg Intravenous Once   • potassium chloride  20 IV as not taking PO   -CT, CTA brain noted without acute issue, MRI brain without acute findings.  Continuous EEG per neuro noted, repeat EEG without seizure activity  -UA contaminant likely, CXR reviewed, no consolidation  -?possibly alcohol related - last planning       Thank Efrem Ruiz MD    [facility] Hospitalist  Answering Service number: [phone]

## 2021-08-27 NOTE — OCCUPATIONAL THERAPY NOTE
OCCUPATIONAL THERAPY EVALUATION - INPATIENT     Room Number: 5356/1589-Y  Evaluation Date: 8/27/2021  Type of Evaluation: Initial  Presenting Problem: status epilepticus    Physician Order: IP Consult to Occupational Therapy  Reason for Therapy: ADL/IADL D PROFILE    HOME SITUATION  Type of Home: House  Home Layout: Two level  Lives With: Spouse    Toilet and Equipment: Standard height toilet  Shower/Tub and Equipment: Walk-in shower          Hand Dominance: Right  Drives: No  Patient Regularly Uses: Glasses ACTIVITIES OF DAILY LIVING ASSESSMENT  AM-PAC ‘6-Clicks’ Inpatient Daily Activity Short Form  How much help from another person does the patient currently need…  -   Putting on and taking off regular lower body clothing?: A Lot  -   Bathing (incl following performance deficits: strength, ROM, coordination, balance, endurance.  These deficits impact the patient’s ability to participate in BADLs and functional transfers, instrumental activities of daily living, rest and sleep, work, leisure and social transfer to bedside commode:  with min assist  Patient will transfer to toilet:  with min assist    UE Exercise Program Goal  Patient will be minimum assistance with bilateral AROM HEP (home exercise program).     Additional Goals:  Pt will tolerate standin

## 2021-08-28 LAB
ALBUMIN SERPL-MCNC: 1.9 G/DL (ref 3.4–5)
ALBUMIN/GLOB SERPL: 0.5 {RATIO} (ref 1–2)
ALP LIVER SERPL-CCNC: 73 U/L
ALT SERPL-CCNC: 24 U/L
ANION GAP SERPL CALC-SCNC: 6 MMOL/L (ref 0–18)
APTT PPP: 81.5 SECONDS (ref 25.4–36.1)
AST SERPL-CCNC: 46 U/L (ref 15–37)
BILIRUB SERPL-MCNC: 0.7 MG/DL (ref 0.1–2)
BUN BLD-MCNC: 9 MG/DL (ref 7–18)
CALCIUM BLD-MCNC: 7.4 MG/DL (ref 8.5–10.1)
CHLORIDE SERPL-SCNC: 104 MMOL/L (ref 98–112)
CO2 SERPL-SCNC: 25 MMOL/L (ref 21–32)
CREAT BLD-MCNC: 0.53 MG/DL
ERYTHROCYTE [DISTWIDTH] IN BLOOD BY AUTOMATED COUNT: 15 %
GLOBULIN PLAS-MCNC: 3.5 G/DL (ref 2.8–4.4)
GLUCOSE BLD-MCNC: 105 MG/DL (ref 70–99)
GLUCOSE BLD-MCNC: 109 MG/DL (ref 70–99)
GLUCOSE BLD-MCNC: 126 MG/DL (ref 70–99)
GLUCOSE BLD-MCNC: 132 MG/DL (ref 70–99)
GLUCOSE BLD-MCNC: 134 MG/DL (ref 70–99)
HAV IGM SER QL: 2.3 MG/DL (ref 1.6–2.6)
HCT VFR BLD AUTO: 30.5 %
HGB BLD-MCNC: 10.3 G/DL
M PROTEIN MFR SERPL ELPH: 5.4 G/DL (ref 6.4–8.2)
MCH RBC QN AUTO: 34.8 PG (ref 26–34)
MCHC RBC AUTO-ENTMCNC: 33.8 G/DL (ref 31–37)
MCV RBC AUTO: 103 FL
OSMOLALITY SERPL CALC.SUM OF ELEC: 279 MOSM/KG (ref 275–295)
PHOSPHATE SERPL-MCNC: 3.8 MG/DL (ref 2.5–4.9)
PLATELET # BLD AUTO: 60 10(3)UL (ref 150–450)
POTASSIUM SERPL-SCNC: 4.2 MMOL/L (ref 3.5–5.1)
RBC # BLD AUTO: 2.96 X10(6)UL
SODIUM SERPL-SCNC: 135 MMOL/L (ref 136–145)
WBC # BLD AUTO: 4.3 X10(3) UL (ref 4–11)

## 2021-08-28 PROCEDURE — 99233 SBSQ HOSP IP/OBS HIGH 50: CPT | Performed by: OTHER

## 2021-08-28 NOTE — SLP NOTE
SPEECH DAILY NOTE - INPATIENT    ASSESSMENT & PLAN   ASSESSMENT  Patient seen for ongoing dysphagia diagnostic intervention. Pt with improved alertness per RN report.  Spouse present and observed to be providing patient with oral swab while she was reclined position  Medication Administration Recommendations: Crushed in puree (if awake/alert enough)                     Discharge Recommendations  Discharge Recommendations/Plan: Undetermined    Treatment Plan  Treatment Plan/Recommendations: SLP to reassess

## 2021-08-28 NOTE — PROGRESS NOTES
Assumed care @ 1930. Pt mostly drowsy during the shift. Alert occasionally alert, waxes and wanes. Pt not coherent when alert. VSS. Tele ST.  -120's. No acute respiratory distress noted. Denies any pain.     Pt resting in bed.    (+) sma

## 2021-08-28 NOTE — PROGRESS NOTES
[facility] Hospitalist Progress Note     PCP: Shweta Salamanca DO    CC:  Follow up    SUBJECTIVE:  Asleep.  and RN at bedside.      OBJECTIVE:  Temp:  [98.2 °F (36.8 °C)-102 °F (38.9 °C)] 98.5 °F (36.9 °C)  Pulse:  [101-120] 115  Resp:  [18-20] 18  BP: (10 1.9*       Recent Labs   Lab 08/27/21  0554 08/27/21  1144 08/28/21  0024 08/28/21  0522 08/28/21  1244   PGLU 108* 126* 126* 134* 132*          Meds:     • valproate  500 mg Intravenous Q12H   • morphINE sulfate  2 mg Intravenous Once   • lacosamide  100 activity  -UA contaminant likely, CXR reviewed, no consolidation  -?possibly alcohol related - last drink night PTA reportedly (ciwa-see below)  -UDS noted. Rapid covid neg. keppra level noted.  Alcohol level <3  - ammonia 26  -b12/keppra levels per neuro

## 2021-08-28 NOTE — PLAN OF CARE
Assumed care at 0700  Pt drowsy and alert intermittently. More awake in late afternoon and evening. Follows simple commands at times. AxOx1-3 throughout shift. Neuros q4. Assessment varies depending on alertness and willingness to participate.   IVF and

## 2021-08-28 NOTE — PROGRESS NOTES
[id number] [facility] Neurology Progress Note    Dagmar Malin Patient Status:  Inpatient    1965 MRN [id number]   [facility] 7NE-A Attending Al Neves, *   Hosp Day # 10 PCP Alice Bess DO     CC: seizures    Amelia Braxton routine EEG.   Findings are consistent with a diffuse and nonspecific encephalopathy characterized by  background slowing and minimal variability.  No clear seizures or seizure tendency captured.  Of note,  A routine EEG cannot fully exclude the possibility see if she can wake up more.  Looks better today after decreasing Vimapt     P:  Seizure precautions   Continue Keppra 1500 mg BID  Vimpat decreased to 100 mg BID (was on 200 mg BID, then decreased to 150 mg BID)  Depakote 500 mg q 12 hours  Continue thiami

## 2021-08-28 NOTE — DIETARY NOTE
Neo 14 FOLLOW UP    Nancy Anderson     TPN order for tonight to provide: 770 dextrose calories, 450 lipid calories, 30% lipids, 70 grams protein in 1800mL fluid to meet ~ 100% of nutrition prescription.      Intake/Ou

## 2021-08-28 NOTE — PLAN OF CARE
Assumed care at 0730  A&Ox2-3, VSS, ST on tele  Neuro assessment unchanged. Pt seems more awake and following some simple commands  Heparin infusing at 4 ml/hr. Next PTT due tomorrow AM  Seizure precautions maintained  Redness noted to nic area.  Powder ap

## 2021-08-29 LAB
ALBUMIN SERPL-MCNC: 2 G/DL (ref 3.4–5)
ALBUMIN/GLOB SERPL: 0.5 {RATIO} (ref 1–2)
ALP LIVER SERPL-CCNC: 70 U/L
ALT SERPL-CCNC: 23 U/L
ANION GAP SERPL CALC-SCNC: 5 MMOL/L (ref 0–18)
APTT PPP: 71.7 SECONDS (ref 25.4–36.1)
AST SERPL-CCNC: 50 U/L (ref 15–37)
BILIRUB SERPL-MCNC: 0.8 MG/DL (ref 0.1–2)
BUN BLD-MCNC: 11 MG/DL (ref 7–18)
CALCIUM BLD-MCNC: 8 MG/DL (ref 8.5–10.1)
CHLORIDE SERPL-SCNC: 105 MMOL/L (ref 98–112)
CO2 SERPL-SCNC: 25 MMOL/L (ref 21–32)
CREAT BLD-MCNC: 0.51 MG/DL
ERYTHROCYTE [DISTWIDTH] IN BLOOD BY AUTOMATED COUNT: 15 %
GLOBULIN PLAS-MCNC: 3.9 G/DL (ref 2.8–4.4)
GLUCOSE BLD-MCNC: 101 MG/DL (ref 70–99)
GLUCOSE BLD-MCNC: 113 MG/DL (ref 70–99)
GLUCOSE BLD-MCNC: 125 MG/DL (ref 70–99)
GLUCOSE BLD-MCNC: 137 MG/DL (ref 70–99)
HAV IGM SER QL: 2.2 MG/DL (ref 1.6–2.6)
HCT VFR BLD AUTO: 32.3 %
HGB BLD-MCNC: 10.7 G/DL
M PROTEIN MFR SERPL ELPH: 5.9 G/DL (ref 6.4–8.2)
MCH RBC QN AUTO: 34.5 PG (ref 26–34)
MCHC RBC AUTO-ENTMCNC: 33.1 G/DL (ref 31–37)
MCV RBC AUTO: 104.2 FL
OSMOLALITY SERPL CALC.SUM OF ELEC: 280 MOSM/KG (ref 275–295)
PHOSPHATE SERPL-MCNC: 3.7 MG/DL (ref 2.5–4.9)
PLATELET # BLD AUTO: 62 10(3)UL (ref 150–450)
POTASSIUM SERPL-SCNC: 4.3 MMOL/L (ref 3.5–5.1)
RBC # BLD AUTO: 3.1 X10(6)UL
SODIUM SERPL-SCNC: 135 MMOL/L (ref 136–145)
WBC # BLD AUTO: 5.9 X10(3) UL (ref 4–11)

## 2021-08-29 PROCEDURE — 99232 SBSQ HOSP IP/OBS MODERATE 35: CPT | Performed by: OTHER

## 2021-08-29 RX ORDER — DEXTROSE AND SODIUM CHLORIDE 5; .9 G/100ML; G/100ML
INJECTION, SOLUTION INTRAVENOUS CONTINUOUS
Status: DISCONTINUED | OUTPATIENT
Start: 2021-08-29 | End: 2021-08-31

## 2021-08-29 RX ORDER — LOPERAMIDE HYDROCHLORIDE 2 MG/1
2 CAPSULE ORAL 4 TIMES DAILY PRN
Status: DISCONTINUED | OUTPATIENT
Start: 2021-08-29 | End: 2021-08-29

## 2021-08-29 RX ORDER — LOPERAMIDE HCL 1 MG/7.5ML
2 SOLUTION ORAL 4 TIMES DAILY PRN
Status: DISCONTINUED | OUTPATIENT
Start: 2021-08-29 | End: 2021-09-07

## 2021-08-29 RX ORDER — FONDAPARINUX SODIUM 7.5 MG/.6ML
7.5 INJECTION SUBCUTANEOUS DAILY
Status: DISCONTINUED | OUTPATIENT
Start: 2021-08-29 | End: 2021-08-31

## 2021-08-29 NOTE — CONSULTS
Pharmacy to dose fondaparinux (Arixtra) per Dr. Marlee Guerra    Pt [facility] with a history of alcoholism taking eliquis prior to admission for Right PCA stroke. Started heparin drip on 8/22/21 with platelets = 88. Steady platelet decline as shown below.  Dr. Marlee Guerra o

## 2021-08-29 NOTE — PLAN OF CARE
Patient continuously monitored on telemetry. Vitals recorded every 4 hours. Medications given per MAR. Patient updated with Plan of Care and education given as needed. Please see EPIC DocFlowsheet for further assessment information.     José Antonio Mcintyre RN with activity based on assessment  Outcome: Not Progressing  Goal: Achieves maximal functionality and self care  Description: INTERVENTIONS  - Monitor swallowing and airway patency with patient fatigue and changes in neurological status  - Encourage and as

## 2021-08-29 NOTE — PROGRESS NOTES
Assumed care @ 1930. Pt more awake, but confused and incoherent. Remaining drowsy during the most of the shift. A/O x 2 to person and place when awake. Neuro checks q shift, no significant changes. Tele ST.  -110's.   SBP 80's at the beginning

## 2021-08-29 NOTE — DIETARY NOTE
Neo 14 FOLLOW UP    Kaylee Anderson     TPN order for tonight to provide: 770 dextrose calories, 450 lipid calories, 30% lipids, 70 grams protein in 1800mL fluid to meet ~ 100% of nutrition prescription.      Intake/Ou

## 2021-08-29 NOTE — PROGRESS NOTES
[facility] Hospitalist Progress Note     PCP: Yin Sotelo DO    CC:  Follow up    SUBJECTIVE:  Laying in bed, alert- AxOx3. (took a while to think about answers- but answered correctly). Some hypotension overnight- 500 ml bolus given.  And responded appropria Lab 08/25/21  0522 08/26/21  0503 08/27/21  0545 08/28/21  0535 08/29/21  0520   ALT 41 34 23 24 23   AST 62* 47* 39* 46* 50*   ALB 2.0* 2.0* 1.7* 1.9* 2.0*       Recent Labs   Lab 08/28/21  1244 08/28/21  1740 08/29/21  0033 08/29/21  0615 08/29/21  121 AEDs  AEDs per neuro, transitioned to IV as not taking PO   -CT, CTA brain noted without acute issue, MRI brain without acute findings.  Continuous EEG per neuro noted, repeat EEG without seizure activity  -UA contaminant likely, CXR reviewed, no consolidat gentle (was dced yetserday). Hb stable. Not tachycardic. No s/s of bleed    FEN:  TPN given lack of nutrition.  Advance diet per slp recs    **PPx-scds, IV heparin --> arixtra  PT/OT consult     Discharge planning       amber RN   Thank Augusto Terrazas

## 2021-08-29 NOTE — CM/SW NOTE
08/29/21 1400   CM/SW Referral Data   Referral Source Social Work (self-referral)   Reason for Referral Discharge planning   Informant Spouse/Significant Other; Other   Patient [address]   Number of Levels in [address] [facility]

## 2021-08-29 NOTE — PROGRESS NOTES
[id number] [facility] Neurology Progress Note    Shethjulien Orozco Patient Status:  Inpatient    1965 MRN [id number]   [facility] 7NE-A Attending Valdez Roque, *   Hosp Day # 6 PCP Fanny Mejia DO     CC: seizures    Maria L Maloney are consistent with a diffuse and nonspecific encephalopathy characterized by  background slowing and minimal variability.  No clear seizures or seizure tendency captured.  Of note,  A routine EEG cannot fully exclude the possibility for epilepsy.     8/22 presenting for seizures.  I will lower VPA to 250mg BID and then hopefull stop it altogether tomorrow if she does well overnight.      P:  Seizure precautions   Continue Keppra 1500 mg BID  Vimpat decreased to 100 mg BID (was on 200 mg BID, then decreased t

## 2021-08-29 NOTE — SLP NOTE
Attempted to see patient for ongoing dysphagia intervention however upon d/w RN, Pt is sleeping as she did not sleep much last night. Will defer for now and re-attempt tomorrow, or as available.    Virginia Razo MS CCC-SLP/L, pager [phone]

## 2021-08-29 NOTE — PLAN OF CARE
Assumed care of pt this afternoon at 15:00, she is having liquid stool every 15min with her nic area very dark red, warm to touch and painfull. Spoke with Dr Ariana Pyle who came to bedside and ordered magic butt cream and imodium.  Patient more comfortable wi

## 2021-08-30 LAB
ALBUMIN SERPL-MCNC: 1.9 G/DL (ref 3.4–5)
ALBUMIN/GLOB SERPL: 0.5 {RATIO} (ref 1–2)
ALP LIVER SERPL-CCNC: 63 U/L
ALT SERPL-CCNC: 20 U/L
ANION GAP SERPL CALC-SCNC: 5 MMOL/L (ref 0–18)
AST SERPL-CCNC: 44 U/L (ref 15–37)
BILIRUB SERPL-MCNC: 0.8 MG/DL (ref 0.1–2)
BUN BLD-MCNC: 13 MG/DL (ref 7–18)
CALCIUM BLD-MCNC: 7.6 MG/DL (ref 8.5–10.1)
CHLORIDE SERPL-SCNC: 109 MMOL/L (ref 98–112)
CO2 SERPL-SCNC: 24 MMOL/L (ref 21–32)
CREAT BLD-MCNC: 0.43 MG/DL
ERYTHROCYTE [DISTWIDTH] IN BLOOD BY AUTOMATED COUNT: 14.7 %
GLOBULIN PLAS-MCNC: 3.7 G/DL (ref 2.8–4.4)
GLUCOSE BLD-MCNC: 123 MG/DL (ref 70–99)
GLUCOSE BLD-MCNC: 128 MG/DL (ref 70–99)
GLUCOSE BLD-MCNC: 133 MG/DL (ref 70–99)
GLUCOSE BLD-MCNC: 134 MG/DL (ref 70–99)
GLUCOSE BLD-MCNC: 143 MG/DL (ref 70–99)
GLUCOSE BLD-MCNC: 162 MG/DL (ref 70–99)
HAV IGM SER QL: 2 MG/DL (ref 1.6–2.6)
HCT VFR BLD AUTO: 30 %
HEPARIN INDUCED PLT ANTIBODY: NEGATIVE
HGB BLD-MCNC: 9.4 G/DL
M PROTEIN MFR SERPL ELPH: 5.6 G/DL (ref 6.4–8.2)
MCH RBC QN AUTO: 33.6 PG (ref 26–34)
MCHC RBC AUTO-ENTMCNC: 31.3 G/DL (ref 31–37)
MCV RBC AUTO: 107.1 FL
OSMOLALITY SERPL CALC.SUM OF ELEC: 287 MOSM/KG (ref 275–295)
PHOSPHATE SERPL-MCNC: 3.1 MG/DL (ref 2.5–4.9)
PLATELET # BLD AUTO: 68 10(3)UL (ref 150–450)
POTASSIUM SERPL-SCNC: 4.2 MMOL/L (ref 3.5–5.1)
RBC # BLD AUTO: 2.8 X10(6)UL
SODIUM SERPL-SCNC: 138 MMOL/L (ref 136–145)
WBC # BLD AUTO: 6.3 X10(3) UL (ref 4–11)

## 2021-08-30 PROCEDURE — 99233 SBSQ HOSP IP/OBS HIGH 50: CPT | Performed by: OTHER

## 2021-08-30 NOTE — PLAN OF CARE
Received patient A/Ox2, RA ST on tele at 0700  Restraints to hands, reordered at 1530 d/t patient attempting to disconnect medical equipment  Neuro q shift  Seizure precautions maintained  Denying pain  Redness to bottom, changed frequently, magic butt pa status  Outcome: Progressing  Goal: Remains free of injury related to seizure activity  Description: INTERVENTIONS:  - Maintain airway, patient safety  and administer oxygen as ordered  - Monitor patient for seizure activity, document and report duration a rest cycle i.e. lights off, TV off, minimize noise and interruptions  - Encourage family to assist in orientation and promotion of home routines  Outcome: Progressing

## 2021-08-30 NOTE — CM/SW NOTE
Spoke with pt's  regarding PT recommendation of [facility] for pt. He would like to see if pt would qualify for Acute Rehab but is agreeable to having referrals sent for [facility]. Referrals sent for [facility] via [facility] - waiting for responses.   DON screen requested

## 2021-08-30 NOTE — DIETARY NOTE
[facility]    NUTRITION ASSESSMENT    Pt does not meet malnutrition criteria. NUTRITION INTERVENTION:     1. Meal and Snacks - as deemed safe and appropriate by MD / SLP  2.  Enteral Nutrition - Preference to use GI tract given functioning GI system lethargic to participate in PO intake. Suspected due to post ictal state, however given hx of ETOH recommend high dose thiamine for potential Wernicke's. Thiamine added to tonight's TPN. ANTHROPOMETRICS:  Ht:  5'2\"  Wt: 56.3 kg (124 lb 1.9 oz).  This documentation     NUTRITION PRESCRIPTION:   Calories: 0851-0631 calories/day (25-30 calories per kg)  Protein: 68-84 grams protein/day (1.2-1.5 grams protein per kg)  Fluid: ~1 ml/kcal or per MD discretion    NUTRITION DIAGNOSIS/PROBLEM:  Inadequate oral i

## 2021-08-30 NOTE — PAYOR COMM NOTE
--------------  CONTINUED STAY REVIEW    Payor: Arianne Laguhlin  #:  [id number]  Authorization Number: [id number]    Admit date: 8/18/21  Admit time:  1:59 PM    Admitting Physician: Gali Petit MD  Attending Physician:  Ban Whitman 16  BP: ()/(59-73) 102/71     08/26/21  0503 08/27/21  0545 08/27/21  2025 08/28/21  0535 08/29/21  0520 08/30/21  0620   WBC 4.2 3.9*  --  4.3 5.9 6.3   HGB 11.5* 9.6* 10.3* 10.3* 10.7* 9.4*   .7* 103.7*  --  103.0* 104.2* 107.1*   PLT 73.0* HTN/HL -eliquis --> heparin gtt (discontinue)-->now arixtra, BB.  Statin on hold (see below), ACEI when ok with neuro  **etoh use disorder-ciwa, folic acid, thiamine, mvi  **melanoma on immunotherapy, transaminitis, previously elevated CK- on steroids, quic Vijay Correa, RN      lacosamide (VIMPAT) injection 100 mg     Date Action Dose Route User    8/30/2021 4426 New Bag 100 mg Intravenous Nils Marie RN    8/29/2021 2019 New Bag 100 mg Intravenous Durga Echevarria RN      levETIRAcetam (KEPPRA) 1,500

## 2021-08-30 NOTE — PHYSICAL THERAPY NOTE
PHYSICAL THERAPY TREATMENT NOTE - INPATIENT    Room Number: 3921/7880-V     Session: 1  Number of Visits to Meet Established Goals: 10    Presenting Problem: status epilepticus     History related to current admission: Patient is a 55year old [facility]      Past Medical History  Past Medical History:   Diagnosis Date   • [facility]13    ?etoh   • CORONARY ARTERY DISEASE    • Depression    • Heart attack ([facility] 05/21/2012   • High blood pressure    • History of blood have...  -   Turning over in bed (including adjusting bedclothes, sheets and blankets)?: A Lot   -   Sitting down on and standing up from a chair with arms (e.g., wheelchair, bedside commode, etc.): Unable   -   Moving from lying on back to sitting on the emotional support provided. Ended session with pt in bed, alarm set, and needs met. Patient End of Session: In bed;Needs met;Call light within reach;RN aware of session/findings; All patient questions and concerns addressed; Alarm set; Family present (jaime

## 2021-08-30 NOTE — SLP NOTE
SPEECH DAILY NOTE - INPATIENT    ASSESSMENT & PLAN   ASSESSMENT  Pt seen for dysphagia tx to reassess swallow fxn, determine safest/least restrictive diet, provide pt/family education and determine plan of care. Pt found lying down in bed sleeping.  Given i Yes  SLP Follow-up Date: 08/31/21  Number of Visits to Meet Established Goals: 3    Session: 2    If you have any questions, please contact Varsha Valencia, [place], [address] [facility]  Pager

## 2021-08-30 NOTE — PROGRESS NOTES
Assumed care @ 1930. Pt a/o x1-2, more awake, but confused. Neuro checks q shift, no significant neuro changes. SBP 's. Tele ST.  -110's. No acute respiratory distress noted. O2 sat 97% on RA. Denies any pain. Pt resting in bed.

## 2021-08-30 NOTE — PROGRESS NOTES
[facility] Hospitalist Progress Note     PCP: Kartik Brown DO    CC:  Follow up    SUBJECTIVE:    More awake per staff.         OBJECTIVE:  Temp:  [98.3 °F (36.8 °C)-99.2 °F (37.3 °C)] 98.7 °F (37.1 °C)  Pulse:  [] 97  Resp:  [16-22] 16  BP: ()/(59 1. 9* 2.0* 1.9*       Recent Labs   Lab 08/29/21  0033 08/29/21  0615 08/29/21  1214 08/30/21  0014 08/30/21  0620   PGLU 113* 125* 137* 133* 128*          Meds:     • Fondaparinux Sodium  7.5 mg Subcutaneous Daily   • morphINE sulfate  2 mg Intravenous Onc Continuous EEG per neuro noted, repeat EEG without seizure activity  -UA contaminant likely, CXR reviewed, no consolidation  -?possibly alcohol related - last drink night PTA reportedly (ciwa-see below)  -UDS noted. Rapid covid neg. keppra level noted.  Alc

## 2021-08-30 NOTE — PROGRESS NOTES
[id number] [facility] Neurology Progress Note    Shethjulien Douglasser Patient Status:  Inpatient    1965 MRN [id number]   [facility] 7NE-A Attending Radha Kulkarni MD   [facility] Day # 12 PCP Fanny Mejia DO     NEUROLOGY   Attending A Collection Time: 08/30/21 12:14 AM   Result Value Ref Range    POC Glucose 133 (H) 70 - 99 mg/dL   Comp Metabolic Panel (14)    Collection Time: 08/30/21  6:20 AM   Result Value Ref Range    Glucose 123 (H) 70 - 99 mg/dL    Sodium 138 136 - 145 mmol/L    P outlined in their notes above or below          Brittany Bates MD  Vascular & General Neurology  [facility]  8/30/2021    (PROCEDURE DONE    (   ) see notes      Chief Complaint:     Seizures    Subjective:  Chioma Grissom is a(n) 5 arm: CONCLUSION: No sonographic evidence for deep venous thrombosis involving the right upper extremity.     8/24/2021 EEG:   IMPRESSION: This is an abnormal routine EEG.   Findings are consistent with a diffuse and nonspecific encephalopathy characterized Thrombocytopenia (HCC)     Intractable abdominal pain     Acute left-sided weakness     Hypokalemia     Chronic systolic heart failure (HCC)     Dyslipidemia     Arterial hypotension     Hypoalbuminemia due to protein-calorie malnutrition ([facility]     Acute c

## 2021-08-31 LAB
ALBUMIN SERPL-MCNC: 1.8 G/DL (ref 3.4–5)
ALBUMIN/GLOB SERPL: 0.5 {RATIO} (ref 1–2)
ALP LIVER SERPL-CCNC: 63 U/L
ALT SERPL-CCNC: 19 U/L
ANION GAP SERPL CALC-SCNC: 4 MMOL/L (ref 0–18)
AST SERPL-CCNC: 52 U/L (ref 15–37)
BILIRUB SERPL-MCNC: 1.1 MG/DL (ref 0.1–2)
BUN BLD-MCNC: 12 MG/DL (ref 7–18)
CALCIUM BLD-MCNC: 7.5 MG/DL (ref 8.5–10.1)
CHLORIDE SERPL-SCNC: 112 MMOL/L (ref 98–112)
CO2 SERPL-SCNC: 24 MMOL/L (ref 21–32)
CREAT BLD-MCNC: 0.47 MG/DL
GLOBULIN PLAS-MCNC: 3.8 G/DL (ref 2.8–4.4)
GLUCOSE BLD-MCNC: 137 MG/DL (ref 70–99)
GLUCOSE BLD-MCNC: 97 MG/DL (ref 70–99)
HAV IGM SER QL: 1.7 MG/DL (ref 1.6–2.6)
M PROTEIN MFR SERPL ELPH: 5.6 G/DL (ref 6.4–8.2)
OSMOLALITY SERPL CALC.SUM OF ELEC: 290 MOSM/KG (ref 275–295)
PHOSPHATE SERPL-MCNC: 3.2 MG/DL (ref 2.5–4.9)
POTASSIUM SERPL-SCNC: 4.4 MMOL/L (ref 3.5–5.1)
SODIUM SERPL-SCNC: 140 MMOL/L (ref 136–145)
TRIGL SERPL-MCNC: 143 MG/DL (ref 30–149)

## 2021-08-31 PROCEDURE — 99233 SBSQ HOSP IP/OBS HIGH 50: CPT | Performed by: OTHER

## 2021-08-31 RX ORDER — MAGNESIUM SULFATE HEPTAHYDRATE 40 MG/ML
2 INJECTION, SOLUTION INTRAVENOUS ONCE
Status: COMPLETED | OUTPATIENT
Start: 2021-08-31 | End: 2021-08-31

## 2021-08-31 RX ORDER — MELATONIN
3 NIGHTLY
Status: DISCONTINUED | OUTPATIENT
Start: 2021-08-31 | End: 2021-09-07

## 2021-08-31 RX ORDER — LISINOPRIL 5 MG/1
5 TABLET ORAL EVERY EVENING
Status: DISCONTINUED | OUTPATIENT
Start: 2021-08-31 | End: 2021-09-07

## 2021-08-31 RX ORDER — LEVOTHYROXINE SODIUM 0.03 MG/1
25 TABLET ORAL
Status: DISCONTINUED | OUTPATIENT
Start: 2021-09-01 | End: 2021-09-07

## 2021-08-31 RX ORDER — METOPROLOL SUCCINATE 25 MG/1
25 TABLET, EXTENDED RELEASE ORAL EVERY EVENING
Status: DISCONTINUED | OUTPATIENT
Start: 2021-08-31 | End: 2021-09-07

## 2021-08-31 NOTE — SLP NOTE
ADULT SWALLOWING EVALUATION    ASSESSMENT    ASSESSMENT/OVERALL IMPRESSION:  Pt seen for repeat bedside swallow evaluation to reassess swallow function, determine safest/least restrictive diet and provide pt/family education.  Previous bedside swallow evalu cough following large sip of thin via cup controlled by patient. Pt should be supervised and possibly assisted with meals for safety.     Following exam, discussed results, risks of aspiration, diet/menu recommendations/options, aspiration precautions and p blood clots     legs; heart; head   • History of ETOH abuse    • Pneumonia, organism unspecified(486)     pt denies having this 10/29/19   • Psychiatric disorder    • Seizure disorder [facility])    • Stroke ([facility] 04/2015   • Unspecified essential hypertension single sips - no straws liquids without overt signs or symptoms of aspiration with 95 % accuracy over 3 session(s).   In Progress   Goal #2 The patient/family/caregiver will demonstrate understanding and implementation of aspiration precautions and swallow

## 2021-08-31 NOTE — PROGRESS NOTES
[facility] Hospitalist Progress Note     PCP: Judy Lawler DO    CC:  Follow up    SUBJECTIVE:    Awake this am, cleared by speech for regular diet with thin liquids.         OBJECTIVE:  Temp:  [97.5 °F (36.4 °C)-99.1 °F (37.3 °C)] 98.6 °F (37 °C)  Pulse:  [16 20 19   AST 39* 46* 50* 44* 52*   ALB 1.7* 1.9* 2.0* 1.9* 1.8*       Recent Labs   Lab 08/30/21  0620 08/30/21  1231 08/30/21  1700 08/30/21  2322 08/31/21  0520   PGLU 128* 162* 134* 143* 137*          Meds:     • magnesium sulfate  2 g Intravenous Once another day to ensure she is able to take PO throughout the day  -CT, CTA brain noted without acute issue, MRI brain without acute findings.  Continuous EEG per neuro noted, repeat EEG without seizure activity  -UA contaminant likely, CXR reviewed, no conso

## 2021-08-31 NOTE — PLAN OF CARE
Received patient A/Ox2-3, ST LEN on tele at 0700  Seizure precautions maintained  Neuro q shift, no new deficits  Tremors to left foot noted in AM, neurology aware  Advanced to regular thin diet, tolerating when alert and awake, able to tolerate small pills occurs, turn patient to side and suction secretions as needed  - Reorient patient post seizure  - Seizure pads on all 4 side rails  - Instruct patient/family to notify RN of any seizure activity  - Instruct patient/family to call for assistance with Froylan Levi

## 2021-08-31 NOTE — PLAN OF CARE
Assumed care 1900  Patient alert and awake, oriented to self confused  0300- mitt restraints changed to soft wrists per physician, patient pulling of mitts and continuously trying to get out of bed, pulling at lines  Will update spouse in AM  No neuro johnson

## 2021-08-31 NOTE — PROGRESS NOTES
[id number] [facility] Neurology Progress Note    Ashleeyue Lealdiane Patient Status:  Inpatient    1965 MRN [id number]   [facility] 7NE-A Attending Kyra Fairbanks MD   [facility] Day # 15 PCP Iman Michaud DO     NEUROLOGY   Attending A Glucose 143 (H) 70 - 99 mg/dL   POCT Glucose    Collection Time: 08/31/21  5:20 AM   Result Value Ref Range    POC Glucose 137 (H) 70 - 99 mg/dL   Comp Metabolic Panel (14)    Collection Time: 08/31/21  5:57 AM   Result Value Ref Range    Glucose 97 70 - 9 depression, HTN, ETOH dependence, stage 3 melanoma (currently receiving chemotherapy), who initially presented with complaints of seizure activity.       Current complaints: nursing reports left foot tapping earlier this morning, no LLE twitching or foot t slowing and minimal variability.  No clear seizures or seizure tendency captured.  Of note,  A routine EEG cannot fully exclude the possibility for epilepsy.     8/22/2021 EEG:   IMPRESSION:This is an abnormal continuous video EEG recording due to presence pain     Seizure due to alcohol withdrawal, uncomplicated (HCC)     Cerebral artery occlusion with cerebral infarction (HCC)     Thrombocytopenia (HCC)     Intractable abdominal pain     Acute left-sided weakness     Hypokalemia     Chronic systolic heart

## 2021-08-31 NOTE — DIETARY NOTE
[facility]    NUTRITION ASSESSMENT    Pt does not meet malnutrition criteria. NUTRITION INTERVENTION:     1. Meal and Snacks - as deemed safe and appropriate by MD / SLP. Encourage PO all meals  2. Medical Nutritional Supplements - Enlive TID.  Disc Patient Weight(s) for the past 336 hrs:   Weight   08/21/21 0400 56.3 kg (124 lb 1.9 oz)   08/19/21 0600 56.4 kg (124 lb 5.4 oz)   08/18/21 0729 51.3 kg (113 lb)       Wt Readings from Last 10 Encounters:  08/21/21 : 56.3 kg (124 lb 1.9 oz)  08/16/21 : 5 documented/reported insufficient oral intake and need for NPO status      MONITOR AND EVALUATE/NUTRITION GOALS:  1. PO intake of 75% of meals TID - New  2. PO intake of 75% of oral nutrition supplement/s - New  3.  Weight stable within 1 to 2 lbs during adm

## 2021-09-01 LAB
ANION GAP SERPL CALC-SCNC: 6 MMOL/L (ref 0–18)
BUN BLD-MCNC: 15 MG/DL (ref 7–18)
CALCIUM BLD-MCNC: 8 MG/DL (ref 8.5–10.1)
CHLORIDE SERPL-SCNC: 109 MMOL/L (ref 98–112)
CO2 SERPL-SCNC: 20 MMOL/L (ref 21–32)
CREAT BLD-MCNC: 0.41 MG/DL
GLUCOSE BLD-MCNC: 75 MG/DL (ref 70–99)
HAV IGM SER QL: 2 MG/DL (ref 1.6–2.6)
OSMOLALITY SERPL CALC.SUM OF ELEC: 280 MOSM/KG (ref 275–295)
POTASSIUM SERPL-SCNC: 4.5 MMOL/L (ref 3.5–5.1)
SODIUM SERPL-SCNC: 135 MMOL/L (ref 136–145)

## 2021-09-01 NOTE — SLP NOTE
SPEECH DAILY NOTE - INPATIENT    ASSESSMENT & PLAN   ASSESSMENT  Patient seen for dysphagia therapy to assess tolerance of current diet, utilization of aspiration precautions, and provide pt/family education. Patient received in bed and alert.  Patient curr coughing episodes since she has been in the hospital outside the context of swallowing. No other overt s/s of aspiration observed. Patient is appropriate to continue with regular consistency and thin liquid diet. Patient is cleared for straws.  SLP will Progress   Goal #4 Complete cog-communication eval pending neuro workup and recovery    TBD       FOLLOW UP  Follow Up Needed (Documentation Required): Yes  SLP Follow-up Date: 09/02/21  Number of Visits to Meet Established Goals: 3      If you have any qu

## 2021-09-01 NOTE — PROGRESS NOTES
[facility] Hospitalist Progress Note     PCP: Judy Lawler DO    CC:  Follow up    INTERVAL HISTORY/SUBJECTIVE:    DAVID Overnight  Afebrile, HDS  No seizure activity noted    No new complaints more alert today, does not remember coming to the hospital but know 0.51* 0.43* 0.47*  --    CA 7.1*  --  7.4* 8.0* 7.6* 7.5*  --    MG 1.7   < > 2.3 2.2 2.0 1.7 2.0   PHOS 3.2  --  3.8 3.7 3.1 3.2  --    *  --  109* 101* 123* 97  --     < > = values in this interval not displayed.        Recent Labs   Lab 08/27/21 weakness     **seizure, continuous partial seizure-   Much improved  - no seizure activity last 48 + h  **LLE twitching (resolved) and LE weakness  **Lethargy, AMS -RESOLVED  -reportedly compliant with AEDs  AEDs per neuro, transitioned to IV, to continue eliquis   PT/OT consult     Discharge planning-> [facility] pending accepting facility        Emely Rodriguez MD  [facility]   [phone]

## 2021-09-01 NOTE — PLAN OF CARE
Assumed care 1900  Patient sleeping, oriented x3, cooperative  Encouraged PO intake  BP stable  ST on tele  No seizure activity  Seizure precautions in place   2 L with sleep

## 2021-09-01 NOTE — PLAN OF CARE
Assumed care at 0700  Patient alert, oriented x3-4 throughout day   Restraints removed @ 0800  Awake for most of day  Worked with speech.  Tolerating regular diet, thin liquids  2 loose BM's. immodium given x1  Denies pain  No seizure activity noted  Pills

## 2021-09-02 LAB
SRA, UNFRACTIONATED HEPARIN, HIGH DOSE: 4 %
SRA, UNFRACTIONATED HEPARIN, LOW DOSE: 4 %
SRA, UNFRACTIONATED HEPARIN: NEGATIVE

## 2021-09-02 RX ORDER — LEVETIRACETAM 500 MG/1
1500 TABLET ORAL 2 TIMES DAILY
Status: DISCONTINUED | OUTPATIENT
Start: 2021-09-03 | End: 2021-09-07

## 2021-09-02 NOTE — PLAN OF CARE
Assumed care of pt this pm. Pt is A/O 3-4. Neuros q shift, no significant neuro changes tonight. Sz precautions in place. NSR;Tele. RA. No complaints of pain.  in place with adequate output. Redness in the nic and genital area noted.  Poor appetite fo

## 2021-09-02 NOTE — OCCUPATIONAL THERAPY NOTE
OCCUPATIONAL THERAPY TREATMENT NOTE - INPATIENT     Room Number: 8160/7008-Y  Session: 1   Number of Visits to Meet Established Goals: 10    Presenting Problem: status epilepticus    History related to current admission: Pt is 55year old female admitted o rehab    OBJECTIVE  Precautions: Seizure;Bed/chair alarm (JOSE Duncan )    WEIGHT BEARING RESTRICTION  Weight Bearing Restriction: None                PAIN ASSESSMENT  Ratin  Location: denies        ACTIVITY TOLERANCE                         O2 SATUR Pt completed grasping and reaching for cups with L UE requiring tactile cues and set-up of L UE digits 4&5 into extension, as well as increased time. Pt and pt's spouse instructed in variations of task to continue with outside of therapy session time.  Pt a training;IADL training;Visual perceptual training;Functional transfer training;UE strengthening/ROM; Endurance training;Cognitive reorientation;Patient/Family education;Patient/Family training;Equipment eval/education; Neuromuscluar reeducation; Fine motor co

## 2021-09-02 NOTE — PAYOR COMM NOTE
--------------  CONTINUED STAY REVIEW    Payor: Arianne Laughlin  #:  [id number]  Authorization Number: [id number]    Admit date: 8/18/21  Admit time:  1:59 PM    Admitting Physician: Al Neves MD  Attending Physician:  Dellia Holter Oral Kristin Almanza RN        8/31 HOSPITALIST NOTE  SUBJECTIVE:     Awake this am, cleared by speech for regular diet with thin liquids.          OBJECTIVE:  Temp:  [97.5 °F (36.4 °C)-99.1 °F (37.3 °C)] 98.6 °F (37 °C)  Pulse:  [] 96  Resp:  [20-35 mmp including but not limited to CAD, HTN/HL, etoh use disorder, cerebrovascular dz with hx CVA, seizure disorder, melanoma on immunotherapy, is admitted for seizure (tonic clonic)- then question of LLE twitching and possible weakness     **seizure, contin so atelectasis. Monitor closely. IS as able     **Hypotension- possibly dt poor po intake- place back on IVF- d50.9 gentle (was dced yetserday). Hb stable. Not tachycardic. No s/s of bleed     FEN:  TPN given lack of nutrition.  Advance diet per slp recs    failure (HCC)    Seizure disorder (HCC)    ETOH abuse    Alcohol withdrawal delirium, acute, hypoactive ([facility]    Chronic ischemic right PCA stroke    Malignant melanoma ([facility]    Primary hypertension    Hypothyroidism    Epilepsia partialis continua ([facility] **Metabolic acidosis      **groin candidiasis   -nystatin      # hypokalemia  -replete per protocol     **diarrhea- c diff neg     **acute on chronic anemia- repeat Hb stable     **fever x1- pt did not receive tylenol etc and temp came down on its own.  Jesus Abreu

## 2021-09-02 NOTE — SLP NOTE
SPEECH DAILY NOTE - INPATIENT    ASSESSMENT & PLAN   ASSESSMENT  Patient seen for dysphagia therapy to assess tolerance of current diet, utilization of aspiration precautions, and provide family/pt education.  Patient received alert in bedside chair althoug and sips  Medication Administration Recommendations: Whole in puree    Patient Experiencing Pain: No                Discharge Recommendations  Discharge Recommendations/Plan: Sub-acute rehabilitation    Treatment Plan  Treatment Plan/Recommendations: No fu

## 2021-09-02 NOTE — PHYSICAL THERAPY NOTE
PHYSICAL THERAPY TREATMENT NOTE - INPATIENT    Room Number: 6320/7705-F     Session: 2  Number of Visits to Meet Established Goals: 10    Presenting Problem: status epilepticus     History related to current admission: Patient is a 55year old [facility]      Past Medical History  Past Medical History:   Diagnosis Date   • [facility]    ?etoh   • CORONARY ARTERY DISEASE    • Depression    • Heart attack ([facility] 05/21/2012   • High blood pressure    • History of blood wheelchair)?: A Lot   -   Need to walk in hospital room?: A Lot   -   Climbing 3-5 steps with a railing?: Total       AM-PAC Score:  Raw Score: 13   Approx Degree of Impairment: 64.91%   Standardized Score (AM-PAC Scale): 36.74   CMS Modifier (G-Code): CL and concerns addressed; Alarm set; Family present    ASSESSMENT   Pt seen in am, alert and oriented to place, date, day and year. Patient demonstrates significant improvement in her activity tolerance and mobility.  Able to ambulate 76' with RW and min assist the time.

## 2021-09-02 NOTE — CM/SW NOTE
PT now recs acute- PMR consulted and referral sent to [facility] in [address]. Sw updated pt's .     Jeffrey Hernandez LCSW  /Discharge Planner  [phone]

## 2021-09-02 NOTE — PROGRESS NOTES
[facility] Hospitalist Progress Note     PCP: Kartik Brown DO    CC:  Follow up    INTERVAL HISTORY/SUBJECTIVE:    DAVID Overnight  Afebrile, HDS  No seizure activity noted    More alert today, feels well, ambulated the halls.   at bedside thinks she prof 3.7 3.1 3.2  --    *   < > 109* 101* 123* 97 75    < > = values in this interval not displayed.        Recent Labs   Lab 08/27/21  0545 08/28/21  0535 08/29/21  0520 08/30/21  0620 08/31/21  0557   ALT 23 24 23 20 19   AST 39* 46* 50* 44* 52*   ALB 1 LE weakness  **Lethargy, AMS -RESOLVED  -reportedly compliant with AEDs  AEDs per neuro, transitioned to IV, to continue IV for another day to ensure she is able to take PO throughout the day  -CT, CTA brain noted without acute issue, MRI brain without acu

## 2021-09-02 NOTE — PLAN OF CARE
Assumed care at 0700  Patient alert, oriented x4. Forgetful at times  Worked with PT/OT. Ambulated in halls with walker.  Recommended acute rehab  Worked with speech  Up to chair  1 loose BM today  No seizure activity noted  Strength improving   PMR geo rivas

## 2021-09-02 NOTE — CM/SW NOTE
Updates sent in Aidin for JEANNINE referrals- await responses. Will need updated therapy notes for eventual insurance auth. PT notified.     Batsheva Nguyen LCSW  /Discharge Planner  [phone]

## 2021-09-03 ENCOUNTER — APPOINTMENT (OUTPATIENT)
Dept: GENERAL RADIOLOGY | Facility: HOSPITAL | Age: 56
DRG: 101 | End: 2021-09-03
Attending: HOSPITALIST
Payer: COMMERCIAL

## 2021-09-03 PROCEDURE — 71045 X-RAY EXAM CHEST 1 VIEW: CPT | Performed by: HOSPITALIST

## 2021-09-03 RX ORDER — BENZONATATE 100 MG/1
100 CAPSULE ORAL ONCE
Status: COMPLETED | OUTPATIENT
Start: 2021-09-03 | End: 2021-09-03

## 2021-09-03 RX ORDER — ONDANSETRON 2 MG/ML
4 INJECTION INTRAMUSCULAR; INTRAVENOUS EVERY 6 HOURS PRN
Status: DISCONTINUED | OUTPATIENT
Start: 2021-09-03 | End: 2021-09-07

## 2021-09-03 NOTE — PROGRESS NOTES
[facility] Pharmacy Note: Route Optimization for Lacosamide (VIMPAT)    Patient is currently on Lacosamide (VIMPAT) 100 mg IV every 12 hours.    The patient meets the criteria to convert to the oral equivalent as established by the IV to Oral conversion protocol a

## 2021-09-03 NOTE — CM/SW NOTE
PMR eval indicates pt is appropriate for Acute Rehab. Pt/ wants pt to go to  for AR. Asked Bridgett Buenrostro at [facility] to submit for insurance auth with pt's [place].

## 2021-09-03 NOTE — CM/SW NOTE
Fay Smith from [facility] called to say that they have received insurance auth for pt to come for acute rehab starting tomorrow. Pt will need to be medically cleared for dc. Pt's  aware.

## 2021-09-03 NOTE — CONSULTS
[facility] Patient Status:  Inpatient    1965 MRN [id number]   [facility] 7NE-A Attending Edilia Cintron MD   [facility] Day # 12 PCP Kartik Brown DO     Patient Identification  Jesus Hutchinson is a 55 year o normal limits with no evidence of metastatic disease    Seen by Neurology. Currently on IV Keppra and Vimpat. No seizures x 2-3 days.  in place. On Reg/thins.     Physiatry consult obtained now to assess pt's funtional status and make appropria bolus 500 mL, 500 mL, Intravenous, Once  vitamin A&D 30g/Alum & Mag Hydro-Simeth (MAALOX) 30 mL/zinc oxide (DESITIN) 40% 30g MAGIC BUTT CREAM, , Topical, Daily PRN  Loperamide HCl (IMODIUM) 1 MG/7.5ML solution 2 mg, 2 mg, Oral, QID PRN  [] adult 3 i Once  [COMPLETED] potassium phosphate dibasic 15 mmol in sodium chloride 0.9% 250 mL IVPB, 15 mmol, Intravenous, Once   Followed by  [COMPLETED] potassium chloride IVPB premix 20 mEq, 20 mEq, Intravenous, Once  [COMPLETED] methylPREDNISolone Sodium Succ (S dextrose 50 % injection, , ,   [COMPLETED] LORazepam (ATIVAN) injection 1 mg, 1 mg, Intravenous, Once  [COMPLETED] LORazepam (ATIVAN) injection 1 mg, 1 mg, Intravenous, Once  [COMPLETED] LORazepam (ATIVAN) injection 1 mg, 1 mg, Intravenous, Once  [COMPLETE reaction. Was on zosyn for UTI during admission             7/2018 without reaction             Review of Systems:  Complete review of systems completed/14 point.   Negative except as that outlined in HPI    OBJECTIVE:    Blood pressure 106/64, pulse 87, te mobility dysfunction due to Status epilepticus.  H/o Stage 3 melanoma    Disposition:     Based on pt's current functional and medical status, Acute inpatient rehabilitation is recommended to maximize patient's independence, provide care giver training, and

## 2021-09-03 NOTE — PHYSICAL THERAPY NOTE
PHYSICAL THERAPY TREATMENT NOTE - INPATIENT    Room Number: 4622/1495-D     Session: 3  Number of Visits to Meet Established Goals: 10    Presenting Problem: status epilepticus     History related to current admission: Patient is a 55year old female [facility]mi [facility])      Past Medical History  Past Medical History:   Diagnosis Date   • [facility]    ?etoh   • CORONARY ARTERY DISEASE    • Depression    • Heart attack ([facility] 05/21/2012   • High blood pressure    • History of blood wheelchair)?: A Little   -   Need to walk in hospital room?: A Little   -   Climbing 3-5 steps with a railing?: Total       AM-PAC Score:  Raw Score: 16   Approx Degree of Impairment: 54.16%   Standardized Score (AM-PAC Scale): 40.78   CMS Modifier (G-Code alert, oriented and very motivated to improve her function. Pt tolerated increased reps of exercises and advanced to static and dynamic balance activity in standing. Patient demonstrates improve functional mobility, able to improve distance of ambulation. 9/3/2021      Therapist  wore appropriate PPE of surgical mask, goggles, gloves when working with patient. Patient was not masked and pt's spouse wore a mask some of the time.

## 2021-09-03 NOTE — PROGRESS NOTES
[facility] Pharmacy Note: Route Optimization for Levetiracetam (KEPPRA)    Patient is currently on Levetiracetam (KEPPRA) 1,500 mg IV every 12 hours.    The patient meets the criteria to convert to the oral equivalent as established by the IV to Oral conversion pr

## 2021-09-03 NOTE — PAYOR COMM NOTE
--------------  CONTINUED STAY REVIEW    Payor: Arianne Laughlin  #:  [id number]  Authorization Number: [id number]    Admit date: 8/18/21  Admit time:  1:59 PM    Admitting Physician: Sarah Coronado MD  Attending Physician:  Miguel Montes 100 mg Oral Mario Bolaños RN      multivitamin with minerals (ADULT) tab 1 tablet     Date Action Dose Route User    9/3/2021 0936 Given 1 tablet Oral Mario Bolaños RN      thiamine (Vitamin B-1) tab 100 mg     Date Action Dose Route User    9/3/2021 3. No enhancing lesions.  Postcontrast images are somewhat limited by motion artifact      CTA CONCLUSION:  No high-grade stenosis, occlusion, dissection, or aneurysm is identified within the major intracranial or cervical arterial vasculature.       CT tenderness guarding or rigidity. Liver and spleen are not enlarged. Bowel sounds present. Musculoskeletal:       Right Upper Extremity:  Strength is 4-5. ROM WNL. Left Upper Extremity:  Strength is 4. ROM WNL.    Right Lower Extremity

## 2021-09-03 NOTE — PLAN OF CARE
Assumed care of pt this pm. Pt is alert and oriented very soft spoken. NSR;Tele. RA. No complaints of pain.  in place w/ adequate urine output. No seizure activity noted. Pt resting comfortably. Plan to dc to acute rehab pending insurance.  Needs atten

## 2021-09-03 NOTE — PLAN OF CARE
Assumed care at 0730. A&OX4. Awake and talking with , pleasant & cooperative. Neuro symptoms include mild Lt sided weakness. Tele - SR/ST. Poor appetite. Og MARRERO'melanie at 1500. Luann applied. Up ambulating to BR.  visiting today.   PAO patient/family to call for assistance with activity based on assessment  Outcome: Progressing  Goal: Achieves maximal functionality and self care  Description: INTERVENTIONS  - Monitor swallowing and airway patency with patient fatigue and changes in neuro

## 2021-09-03 NOTE — DIETARY NOTE
[facility]    NUTRITION ASSESSMENT    Pt does not meet malnutrition criteria. NUTRITION INTERVENTION:     1. Meal and Snacks - as deemed safe and appropriate by MD / SLP. Encourage PO all meals  2. Medical Nutritional Supplements - Enlive TID.  Disc intake. Suspected due to post ictal state, however given hx of ETOH recommend high dose thiamine for potential Wernicke's. Thiamine added to tonight's TPN. ANTHROPOMETRICS:  Ht:  5'2\"  Wt: 56.3 kg (124 lb 1.9 oz).  This is 113% of IBW  BMI: Body mass supplement/s - Met, continues  3.  Weight stable within 1 to 2 lbs during admission - Met, continues      MEDICATIONS:  Reviewed    LABS:  Reviewed    Pt is at Low nutrition risk    FOLLOW-UP DATE: 9/10    Alannah Lopez RD,TORREYN  Phone #[phone]  Pager #[phone]

## 2021-09-04 LAB — SARS-COV-2 RNA RESP QL NAA+PROBE: DETECTED

## 2021-09-04 RX ORDER — LEVOTHYROXINE SODIUM 0.03 MG/1
25 TABLET ORAL
Qty: 60 TABLET | Refills: 0 | Status: SHIPPED | OUTPATIENT
Start: 2021-09-05 | End: 2021-09-07

## 2021-09-04 RX ORDER — MELATONIN
100 DAILY
Qty: 30 TABLET | Refills: 0 | Status: SHIPPED | OUTPATIENT
Start: 2021-09-04 | End: 2021-09-07

## 2021-09-04 RX ORDER — MELATONIN
Qty: 30 TABLET | Refills: 0 | Status: SHIPPED | OUTPATIENT
Start: 2021-09-04 | End: 2021-09-07

## 2021-09-04 RX ORDER — ACETAMINOPHEN 325 MG/1
650 TABLET ORAL EVERY 6 HOURS PRN
Status: DISCONTINUED | OUTPATIENT
Start: 2021-09-04 | End: 2021-09-07

## 2021-09-04 NOTE — PLAN OF CARE
Assumed care of patient at [address]. Patient is alert and oriented, forgetful at times. Oxygen saturation remains above 90% on room air. NSR/ST on telemetry. Denies pain or nausea. Plan to discharge to [facility] .       Problem: Patient/Family Goals  Goal: Patient/Fami maximal functionality and self care  Description: INTERVENTIONS  - Monitor swallowing and airway patency with patient fatigue and changes in neurological status  - Encourage and assist patient to increase activity and self care with guidance from PT/OT  -

## 2021-09-04 NOTE — CM/SW NOTE
[facility] has a bed for pt today. They do require a rapid covid test prior to admit there. Will update unit RN. Per unit RN, pt's covid test came back positive. Wen Rose at [facility] said they will not be able to accept pt today.  Pt will need a covid room

## 2021-09-04 NOTE — DISCHARGE SUMMARY
General Medicine Discharge Summary     Patient ID:  Tremayne Sol  55year old  5/5/1965    Admit date: 8/18/2021    Discharge date and time: 9/4/2021    Attending Physician: Oscar Mcfarlane noted without acute issue, MRI brain without acute findings.  Continuous EEG per neuro noted, repeat EEG without seizure activity  -UA contaminant likely, CXR reviewed, no consolidation  -?possibly alcohol related - last drink night PTA reportedly (ciwa-see daily.    Miconazole Nitrate 2 % External Powder  Apply 1 Application topically 2 (two) times a day. CONTINUE these medications which have CHANGED    lacosamide (VIMPAT) 50 MG Oral Tab  Take 2 tablets (100 mg total) by mouth 2 (two) times daily.

## 2021-09-04 NOTE — PLAN OF CARE
Assumed care of patient at 07:30 am.   A/O x 4, forgetful at times, cooperative. VSS. RA. Neuro assessments per order, no new neuro deficits noted.  at bedside, asked to put mask on and policy restated.   Patient and  updated on plan to d and administer oxygen as ordered  - Monitor patient for seizure activity, document and report duration and description of seizure to MD/LIP  - If seizure occurs, turn patient to side and suction secretions as needed  - Reorient patient post seizure  - [address]

## 2021-09-05 DIAGNOSIS — I63.50 CEREBRAL ARTERY OCCLUSION WITH CEREBRAL INFARCTION (HCC): ICD-10-CM

## 2021-09-05 NOTE — CM/SW NOTE
DEENA spoke with Kareen Hill at [facility] to clarify accepting pt since she tested positive for COVID-19 as of 9/4. Kareen Hill states typically pt's who are positive COVID have to wait at least 7 days for them to accept.  Kareen Hill states they are waiting to hear back from t

## 2021-09-05 NOTE — COVID NURSING ASSESSMENT
COVID-19 Daily Discharge Readiness-Nursing    O2 Sat at Rest:    94 % on room air  O2 Sat with Exertion:    % on    liters   Temperature max from last 24 hrs: Temp (24hrs), Av.8 °F (37.1 °C), Min:98 °F (36.7 °C), Max:99.6 °F (37.6 °C)    Inflammatory M

## 2021-09-05 NOTE — PLAN OF CARE
Patient is alert and oriented x4, forgetful, delayed responses. NSR on tele. Oxygenation is 100% on RA. L sounds clear-diminished. VSS. Patient denies chest pain, shortness of breath, palpitations. Denies pain.  Patient is resting comfortably in bed at this rails  - Instruct patient/family to notify RN of any seizure activity  - Instruct patient/family to call for assistance with activity based on assessment  Outcome: Progressing  Goal: Achieves maximal functionality and self care  Description: INTERVENTIONS

## 2021-09-05 NOTE — PROGRESS NOTES
[facility] Hospitalist Progress Note     PCP: Shweta Salamanca DO    CC:  Follow up    INTERVAL HISTORY/SUBJECTIVE:    Pt stayed since covid returned +.       OBJECTIVE:  Temp:  [98 °F (36.7 °C)-98.8 °F (37.1 °C)] 98.1 °F (36.7 °C)  Pulse:  [70-97] 76  Resp:  [16- Once   • Miconazole Nitrate   [email or web address] [email or web address]   • sertraline  100 mg Oral Daily   • thiamine  100 mg Oral Daily   • multivitamin with minerals  1 tablet Oral Daily   • folic acid  1 mg Oral Daily     • dextrose       acetaminophen, ondansetron, dextr suspect reactive, monitor. Possible d/t alcohol use. Was On IVF too so possible dilution  - HIPA negative, dc arixtra and eliquis restarted   - plt stable    **abnormal TFTs, hypothyroid- initiated low dose levothyroxine.  F/u in 4-6 weeks outpatient with ISMAEL

## 2021-09-06 LAB
ARTERIAL PATENCY WRIST A: POSITIVE
BASE EXCESS BLDA CALC-SCNC: -4.3 MMOL/L (ref ?–2)
BODY TEMPERATURE: 98.6 F
COHGB MFR BLD: 1.4 % SAT (ref 0–3)
HCO3 BLDA-SCNC: 17.6 MEQ/L (ref 22–26)
HGB BLD-MCNC: 11 G/DL
METHGB MFR BLD: 0.7 % SAT (ref 0.4–1.5)
P/F RATIO: 398 MMHG
PCO2 BLDA: 24 MM HG (ref 35–45)
PH BLDA: 7.49 [PH] (ref 7.35–7.45)
PO2 BLDA: 84 MM HG (ref 80–105)
SAO2 % BLDA FROM PO2: 97 % (ref 92–100)
SAO2 % BLDA: 95 % (ref 92–100)

## 2021-09-06 NOTE — PROGRESS NOTES
COVID-19 Daily Discharge Readiness-Nursing    O2 Sat at Rest:     92%   O2 Sat with Exertion:    % on    liters   Temperature max from last 24 hrs: Temp (24hrs), Av.2 °F (36.8 °C), Min:98 °F (36.7 °C), Max:98.4 °F (36.9 °C)    Inflammatory Markers: No

## 2021-09-07 VITALS
OXYGEN SATURATION: 98 % | WEIGHT: 103 LBS | SYSTOLIC BLOOD PRESSURE: 115 MMHG | DIASTOLIC BLOOD PRESSURE: 76 MMHG | BODY MASS INDEX: 19 KG/M2 | TEMPERATURE: 98 F | RESPIRATION RATE: 22 BRPM | HEART RATE: 108 BPM

## 2021-09-07 LAB
SARS-COV-2 RNA RESP QL NAA+PROBE: NOT DETECTED
TRIGL SERPL-MCNC: 112 MG/DL (ref 30–149)

## 2021-09-07 RX ORDER — MELATONIN
100 DAILY
Qty: 30 TABLET | Refills: 0 | Status: SHIPPED | OUTPATIENT
Start: 2021-09-07

## 2021-09-07 RX ORDER — ATORVASTATIN CALCIUM 40 MG/1
TABLET, FILM COATED ORAL
Qty: 90 TABLET | Refills: 0 | Status: SHIPPED | OUTPATIENT
Start: 2021-09-07 | End: 2021-09-07

## 2021-09-07 RX ORDER — DOXEPIN HYDROCHLORIDE 50 MG/1
1 CAPSULE ORAL DAILY
COMMUNITY

## 2021-09-07 RX ORDER — ATORVASTATIN CALCIUM 40 MG/1
40 TABLET, FILM COATED ORAL NIGHTLY
Qty: 90 TABLET | Refills: 0 | Status: SHIPPED | OUTPATIENT
Start: 2021-09-07 | End: 2021-10-01

## 2021-09-07 RX ORDER — LEVOTHYROXINE SODIUM 0.03 MG/1
25 TABLET ORAL
Qty: 60 TABLET | Refills: 0 | Status: SHIPPED | OUTPATIENT
Start: 2021-09-07 | End: 2021-10-25

## 2021-09-07 RX ORDER — MELATONIN
Qty: 30 TABLET | Refills: 0 | Status: SHIPPED | OUTPATIENT
Start: 2021-09-07 | End: 2021-10-01

## 2021-09-07 NOTE — PROGRESS NOTES
[facility] Hospitalist Progress Note     PCP: Iman Michaud DO    CC:  Follow up    INTERVAL HISTORY/SUBJECTIVE:    Sending PCR.   She is up and looks great      OBJECTIVE:  Temp:  [98.2 °F (36.8 °C)-98.8 °F (37.1 °C)] 98.2 °F (36.8 °C)  Pulse:  [69-82] 82  Res Daily   • thiamine  100 mg Oral Daily   • multivitamin with minerals  1 tablet Oral Daily   • folic acid  1 mg Oral Daily     • dextrose       acetaminophen, ondansetron, dextromethorphan-guaiFENesin, vitamin A&D 30g/Alum & Mag Hydro-Simeth (MAALOX) 30 mL/ dilution  - HIPA negative, dc arixtra and eliquis restarted   - plt stable    **abnormal TFTs, hypothyroid- initiated low dose levothyroxine.  F/u in 4-6 weeks outpatient with PCP    Stable chronic illnesses:  **etoh use disorder-ciwa, folic acid, thiamine,

## 2021-09-07 NOTE — PROGRESS NOTES
NURSING DISCHARGE NOTE    Discharged Rehab facility via Ambulance. Accompanied by Support staff  Belongings Taken by patient/family       Pt was discharged via ambulance. PICC removed, site dressing changed several times.  Pt tolerated well. tele remov

## 2021-09-07 NOTE — PLAN OF CARE
Pt ao x4, a bit forgetful. Responses are delayed but appropriate. Mild tremors in both hands. VSS on RA. Tele NSR. BP runs low. On Eliquis. Up standby with walker to void. Seizure precautions in place. IV saline locked. Regular diet, tolerating well.  No co Monitor neurological status  Outcome: Adequate for Discharge     Problem: NEUROLOGICAL - ADULT  Goal: Remains free of injury related to seizure activity  Description: INTERVENTIONS:  - Maintain airway, patient safety  and administer oxygen as ordered  - Mo

## 2021-09-07 NOTE — CM/SW NOTE
Pt assigned to room 2205 at [facility], ph#[phone] for nurse report, SW will follow up on transportation and timing. [facility] arranged for 2pm. SW updated pt's  of dc time.          Roger, [address]

## 2021-09-07 NOTE — CM/SW NOTE
Case discussed in rounds. Pt's dc to  was held over the weekend due to positive rapid covid test.  did not have bed. Pt had a pcr test yesterday that came back negative. Discussed with Parul Rodríguez of  who will request a bed, SW will follow.        Martin Santana

## 2021-09-07 NOTE — PLAN OF CARE
Patient is alert and oriented, but can be forgetful. Wdl on RA. NS on tele. VSS. On eliquis. Up w/ walker. Voids. Seizure/fall precautions in place. No c/o pain at this time. Left arm precautions maintained for Left arm PICC. Covid PCR pending.  POC discus Progressing  Goal: Remains free of injury related to seizure activity  Description: INTERVENTIONS:  - Maintain airway, patient safety  and administer oxygen as ordered  - Monitor patient for seizure activity, document and report duration and description of team and initiate plans and interventions as needed.    Outcome: Progressing

## 2021-09-07 NOTE — DISCHARGE SUMMARY
General Medicine Discharge Summary     Patient ID:  Yanely Orellana  55year old  5/5/1965    Admit date: 8/18/2021    Discharge date and time: 9/7/2021    Attending Physician: Parish Luo noted without acute issue, MRI brain without acute findings.  Continuous EEG per neuro noted, repeat EEG without seizure activity  -UA contaminant likely, CXR reviewed, no consolidation  -?possibly alcohol related - last drink night PTA reportedly (ciwa-see by mouth before breakfast.    melatonin 3 MG Oral Tab  Take on tablet nightly    thiamine 100 MG Oral Tab  Take 1 tablet (100 mg total) by mouth daily. Miconazole Nitrate 2 % External Powder  Apply 1 Application topically 2 (two) times a day.       Chirag Lezama [facility]  [phone]

## 2021-09-07 NOTE — TELEPHONE ENCOUNTER
Medication: ATORVASTATIN 40 MG    Date of last refill: 6/15/21 (#90/0)  [place]     Last office visit: 5/24/21  Due back to clinic per last office note:  6 months  Date next office visit scheduled:    Future Appointments

## 2021-09-07 NOTE — PAYOR COMM NOTE
--------------  CONTINUED STAY REVIEW    Payor: Arianne Laughlin  #:  [id number]  Authorization Number: [id number]    Admit date: 8/18/21  Admit time:  1:59 PM    Admitting Physician: Quinton Yost MD  Attending Physician:  Maranda Billingsley covid test came back positive. Kyle Lawson at hospitals said they will not be able to accept pt today.  Pt will need a covid room there, none available, SW/MADIE to follow up.     9/5 HOSPITALIST NOTE  INTERVAL HISTORY/SUBJECTIVE:     Pt stayed since covid returned + consulted, in agreement     **acute anemia, thrombocytopenia- suspect reactive, monitor. Possible d/t alcohol use.  Was On IVF too so possible dilution  - HIPA negative, dc arixtra and eliquis restarted   - plt stable     **abnormal TFTs, hypothyroid- initi

## 2021-09-08 NOTE — PAYOR COMM NOTE
--------------  DISCHARGE REVIEW    Payor: Arianne Laughlin  #:  [id number]  Authorization Number: [id number]    Admit date: 8/18/21  Admit time:   1:59 PM  Discharge Date: 9/7/2021  3:08 PM     Admitting Physician: Thong Bridges, [address] [facility]  [phone]    In 1 week      - Labs: none  - Radiology: none    Hospital Course:      54 yo with mmp including but not limited to CAD, HTN/HL, etoh use disorder, cerebrovascular dz with hx CVA, seizure disorder, britton etiology  - trial tessalon x1; guaifenesin-dxm syrup q4h prn      **acute on chronic anemia- repeat Hb stable, ctm     # positive rapid covid w negative PCR  - pt DOES NOT have covid. Initial rapid test was likely false +.   She has no covid sx and does no tablet    Multiple Vitamin (TAB-A-SUDARSHAN) Oral Tab          I PERSONALLY RECONCILED CURRENT AND DISCHARGE MEDICATIONS ON DAY OF DISCHARGE      Activity: activity as tolerated  Diet: regular   Wound Care: as directed  Code Status: Full Code      Exam on day o

## 2021-09-10 DIAGNOSIS — R56.9 SEIZURE (HCC): ICD-10-CM

## 2021-09-14 ENCOUNTER — PATIENT MESSAGE (OUTPATIENT)
Dept: NEUROLOGY | Facility: CLINIC | Age: 56
End: 2021-09-14

## 2021-09-14 NOTE — TELEPHONE ENCOUNTER
Per Epic review, during last hospitalization at [facility] on 08/18/2021 - Vimpat increased to 100 mg BID. Per MyChart message:      Dr. Anjelica Gray at [facility] current medication list here at [facility] and I see 50mg Vimpat 2 times a day.   She was at 50mg twice

## 2021-09-15 NOTE — TELEPHONE ENCOUNTER
Spoke with patient's spouse and he states that he does not believe that patient has been on 100 mg of VIMPAT BID since admission to [place] [address] on 9/7/2021. He reports she is \"clear headed\" and he requests that the VIMPAT be titrated up.  RN advised that provider

## 2021-09-15 NOTE — TELEPHONE ENCOUNTER
Attempted to call patient's  to discuss Vimpat dosing. LMTCB. Per Epic review, patient was discharged from [facility] on VIMPAT 100 mg BID on 9/7/2021. [employer] message indicates that patient was taking 50 mg BID of VIMPAT after discharge.   Needs clar

## 2021-09-15 NOTE — TELEPHONE ENCOUNTER
Patient  called and indicated that wife is currently at [facility]. She is currently taking Vimpat 50 mg tabs twice a day.    indicated office will have to call [facility] and talk with Dr. Marcelo Suarez # is [phone] to get Vimpat increased to

## 2021-09-16 ENCOUNTER — ORDER TRANSCRIPTION (OUTPATIENT)
Dept: PHYSICAL THERAPY | Facility: HOSPITAL | Age: 56
End: 2021-09-16

## 2021-09-16 DIAGNOSIS — C80.1 CANCER (HCC): ICD-10-CM

## 2021-09-16 DIAGNOSIS — Z74.09 REDUCED MOBILITY: Primary | ICD-10-CM

## 2021-09-16 DIAGNOSIS — I63.9 IMPENDING CEREBROVASCULAR ACCIDENT (HCC): ICD-10-CM

## 2021-09-16 DIAGNOSIS — R56.9 GENERALIZED-ONSET SEIZURES (HCC): ICD-10-CM

## 2021-09-17 ENCOUNTER — PATIENT MESSAGE (OUTPATIENT)
Dept: NEUROLOGY | Facility: CLINIC | Age: 56
End: 2021-09-17

## 2021-09-17 RX ORDER — LACOSAMIDE 50 MG
TABLET ORAL
Qty: 180 TABLET | Refills: 0 | OUTPATIENT
Start: 2021-09-17

## 2021-09-17 NOTE — TELEPHONE ENCOUNTER
From: Brandt Boykin  Sent: 9/17/2021 10:14 AM CDT  To: Kasie Hughes Nurse  Subject: Vimpat Dosage    This message is being sent by Axel Riojas on behalf of Brandt Boykin.     We're you able to reach the  At [facility] and get the dose

## 2021-09-17 NOTE — TELEPHONE ENCOUNTER
Called and spoke with Laurie at [facility], she confirms pt is taking Vimpat 100 mg BID. Sent pt's  message on [employer].

## 2021-09-27 ENCOUNTER — TELEPHONE (OUTPATIENT)
Dept: PHYSICAL THERAPY | Facility: HOSPITAL | Age: 56
End: 2021-09-27

## 2021-10-01 ENCOUNTER — OFFICE VISIT (OUTPATIENT)
Dept: OCCUPATIONAL MEDICINE | Facility: HOSPITAL | Age: 56
End: 2021-10-01
Payer: COMMERCIAL

## 2021-10-01 DIAGNOSIS — Z74.09 REDUCED MOBILITY: ICD-10-CM

## 2021-10-01 DIAGNOSIS — R56.9 GENERALIZED-ONSET SEIZURES (HCC): ICD-10-CM

## 2021-10-01 DIAGNOSIS — C80.1 CANCER (HCC): ICD-10-CM

## 2021-10-01 DIAGNOSIS — I63.9 IMPENDING CEREBROVASCULAR ACCIDENT (HCC): ICD-10-CM

## 2021-10-01 PROCEDURE — 97166 OT EVAL MOD COMPLEX 45 MIN: CPT

## 2021-10-01 PROCEDURE — 97110 THERAPEUTIC EXERCISES: CPT

## 2021-10-01 NOTE — PROGRESS NOTES
OCCUPATIONAL THERAPY UPPER EXTREMITY EVALUATION   Referring Physician: Dr. Francine Grubbs  Diagnosis: Reduced mobility (Z74.09)  Generalized-onset seizures ([facility] (R56.9)  Impending cerebrovascular accident ([facility] (I63.9)  Cancer ([facility] (C80.1)     Date of Service Pt lives with  and adult daughter who both work from home and are able to assist as needed. Pt reports she is able to manage all ADLs at this time Independently and requires some assistance with IADL management.   Imaging/Tests:  8/19/2021: MRI BRAIN orders for evaluation and treatment as indicated to maximize rehab potential. The results of the objective tests and measures show L side deficits in strength, coordination, visual deficits, and cognition deficits in areas of executive functioning, visuosp been utilizing compensatory strategies such as turning head for visual tracking since then. SENSORY: Pt reports constant numbness/tingling to all L digits at this time.     EDEMA: N/A    AROM (degrees): Pt BUE AROM for shoulder, elbow, forearm, wrist, an  strength increase to at least 38.0 lbs indicating increased ability to manage functional ADL/IADLs. 2. Pt will demonstrate increase in the MoCA of at least 26/30 indicating increased ability to manage functional ADL/IADL tasks.   3. Pt will demonstrat

## 2021-10-05 ENCOUNTER — OFFICE VISIT (OUTPATIENT)
Dept: OCCUPATIONAL MEDICINE | Facility: HOSPITAL | Age: 56
End: 2021-10-05
Payer: COMMERCIAL

## 2021-10-05 DIAGNOSIS — Z74.09 REDUCED MOBILITY: ICD-10-CM

## 2021-10-05 DIAGNOSIS — R56.9 GENERALIZED-ONSET SEIZURES (HCC): ICD-10-CM

## 2021-10-05 DIAGNOSIS — C80.1 CANCER (HCC): ICD-10-CM

## 2021-10-05 DIAGNOSIS — I63.9 IMPENDING CEREBROVASCULAR ACCIDENT (HCC): ICD-10-CM

## 2021-10-05 PROCEDURE — 97110 THERAPEUTIC EXERCISES: CPT

## 2021-10-05 NOTE — PROGRESS NOTES
Dx: Reduced mobility (Z74.09)  Generalized-onset seizures (HCC) (R56.9)  Imp[facility] ([facility] (I63.9)  Cancer ([facility] (C80.1)             Insurance (Authorized # of Visits):  2/12 [employer]          Authorizing Physician: Dr. Barak Harris  Next (lbs) Right Average Left Average   : 44.0 lbs 33.0 lbs   2 pt Pinch: 7.0 lbs 4.0 lbs   3 pt Pinch: 10.0 lbs 5.0 lbs   Lateral Pinch: 9.0 lbs 8.0 lbs      SPECIAL TESTS:   Nine-Hole Peg Test: R=25.31 sec; L=55.1 sec    Assessment: Pt continues to demons each  -Median/Ulnar/Radial Nerve Glides LUE x5 reps each hold 5 sec  -Red Flexbar Exercises for forearm pronation, supination, wrist flexion, wrist extension, clockwise turn, counterclockwise turn to 2 sets of 10 reps each  -[address] [place] [facility]/In-Hand Manipulat

## 2021-10-07 ENCOUNTER — TELEPHONE (OUTPATIENT)
Dept: PHYSICAL THERAPY | Facility: HOSPITAL | Age: 56
End: 2021-10-07

## 2021-10-07 ENCOUNTER — APPOINTMENT (OUTPATIENT)
Dept: OCCUPATIONAL MEDICINE | Facility: HOSPITAL | Age: 56
End: 2021-10-07
Payer: COMMERCIAL

## 2021-10-19 ENCOUNTER — OFFICE VISIT (OUTPATIENT)
Dept: OCCUPATIONAL MEDICINE | Facility: HOSPITAL | Age: 56
End: 2021-10-19
Payer: COMMERCIAL

## 2021-10-19 DIAGNOSIS — Z74.09 REDUCED MOBILITY: ICD-10-CM

## 2021-10-19 DIAGNOSIS — I63.9 IMPENDING CEREBROVASCULAR ACCIDENT (HCC): ICD-10-CM

## 2021-10-19 DIAGNOSIS — C80.1 CANCER (HCC): ICD-10-CM

## 2021-10-19 DIAGNOSIS — R56.9 GENERALIZED-ONSET SEIZURES (HCC): ICD-10-CM

## 2021-10-19 PROCEDURE — 97110 THERAPEUTIC EXERCISES: CPT

## 2021-10-19 NOTE — PROGRESS NOTES
Dx: Reduced mobility (Z74.09)  Generalized-onset seizures (HCC) (R56.9)  Impending cerebrovascular accident ([facility] (I63.9)  Cancer ([facility] (C80.1)             Insurance (Authorized # of Visits):  3/12 [address] [place] [address] Physician: Dr. Luís Galeas  Next Average Left Average   : 44.0 lbs 33.0 lbs   2 pt Pinch: 7.0 lbs 4.0 lbs   3 pt Pinch: 10.0 lbs 5.0 lbs   Lateral Pinch: 9.0 lbs 8.0 lbs      SPECIAL TESTS:   Nine-Hole Peg Test: R=25.31 sec; L=55.1 sec    Assessment: Pt continues to demonstrate DELTA [facility] de each  -Median/Ulnar/Radial Nerve Glides LUE x5 reps each hold 5 sec  -Red Flexbar Exercises for forearm pronation, supination, wrist flexion, wrist extension, clockwise turn, counterclockwise turn to 2 sets of 10 reps each  -[address] [place] [facility]/In-Hand Manipulat

## 2021-10-22 ENCOUNTER — OFFICE VISIT (OUTPATIENT)
Dept: OCCUPATIONAL MEDICINE | Facility: HOSPITAL | Age: 56
End: 2021-10-22
Payer: COMMERCIAL

## 2021-10-22 DIAGNOSIS — R56.9 GENERALIZED-ONSET SEIZURES (HCC): ICD-10-CM

## 2021-10-22 DIAGNOSIS — G40.909 SEIZURE DISORDER (HCC): ICD-10-CM

## 2021-10-22 DIAGNOSIS — I63.9 IMPENDING CEREBROVASCULAR ACCIDENT (HCC): ICD-10-CM

## 2021-10-22 DIAGNOSIS — Z74.09 REDUCED MOBILITY: ICD-10-CM

## 2021-10-22 DIAGNOSIS — C80.1 CANCER (HCC): ICD-10-CM

## 2021-10-22 PROCEDURE — 97530 THERAPEUTIC ACTIVITIES: CPT

## 2021-10-22 PROCEDURE — 97110 THERAPEUTIC EXERCISES: CPT

## 2021-10-22 RX ORDER — LEVETIRACETAM 750 MG/1
1500 TABLET ORAL 2 TIMES DAILY
Qty: 120 TABLET | Refills: 5 | Status: CANCELLED | OUTPATIENT
Start: 2021-10-22

## 2021-10-22 NOTE — PROGRESS NOTES
Dx: Reduced mobility (Z74.09)  Generalized-onset seizures (HCC) (R56.9)  Impending cerebrovascular accident ([facility] (I63.9)  Cancer ([facility] (C80.1)             Insurance (Authorized # of Visits):  4/12 [employer]          Authorizing Physician: Dr. Karlee Brewer  Next R=5/5; L=4+/5  Wrist Flexion:  R=5/5; L=4+/5  Wrist Extension:  R=5/5; L=4+/5     Strength (lbs) Right Average Left Average   : 44.0 lbs 33.0 lbs   2 pt Pinch: 7.0 lbs 4.0 lbs   3 pt Pinch: 10.0 lbs 5.0 lbs   Lateral Pinch: 9.0 lbs 8.0 lbs      SPECIAL Exercises for forward punch, bicep curl, tricep extension, shoulder blade squeeze 2 sets of 15 reps each  -Median/Ulnar/Radial Nerve Glides LUE x5 reps each hold 5 sec  -Red Flexbar Exercises for forearm pronation, supination, wrist flexion, wrist extensio

## 2021-10-22 NOTE — TELEPHONE ENCOUNTER
Medication: lacosamide 100 MG Oral Tab, levetiracetam 750 MG Oral Tab    Date of last refill: 9/7/21 (#120/0), 5/24/2021 (#120/5)  [place]    Last office visit: 5/24/21  Due back to clinic per last office note:  6 mo Eliquis 5mg BID  - Continue Atorvastatin 40mg QHS  - Follow up with PCP and cardiology     Stroke Prevention  - Limit EtOH to no more than 2 drinks per day for men and 1 for women  - Follow  A mediterranean diet  - Abstain from tobacco products  - BP goals

## 2021-10-25 ENCOUNTER — OFFICE VISIT (OUTPATIENT)
Dept: PHYSICAL THERAPY | Facility: HOSPITAL | Age: 56
End: 2021-10-25
Payer: COMMERCIAL

## 2021-10-25 ENCOUNTER — OFFICE VISIT (OUTPATIENT)
Dept: OCCUPATIONAL MEDICINE | Facility: HOSPITAL | Age: 56
End: 2021-10-25
Payer: COMMERCIAL

## 2021-10-25 DIAGNOSIS — C80.1 CANCER (HCC): ICD-10-CM

## 2021-10-25 DIAGNOSIS — Z74.09 REDUCED MOBILITY: ICD-10-CM

## 2021-10-25 DIAGNOSIS — R56.9 GENERALIZED-ONSET SEIZURES (HCC): ICD-10-CM

## 2021-10-25 DIAGNOSIS — I63.9 IMPENDING CEREBROVASCULAR ACCIDENT (HCC): ICD-10-CM

## 2021-10-25 PROCEDURE — 97110 THERAPEUTIC EXERCISES: CPT

## 2021-10-25 PROCEDURE — 97163 PT EVAL HIGH COMPLEX 45 MIN: CPT

## 2021-10-25 PROCEDURE — 97530 THERAPEUTIC ACTIVITIES: CPT

## 2021-10-25 RX ORDER — LEVETIRACETAM 750 MG/1
TABLET ORAL
Qty: 120 TABLET | Refills: 0 | OUTPATIENT
Start: 2021-10-25

## 2021-10-25 RX ORDER — LEVETIRACETAM 750 MG/1
1500 TABLET ORAL 2 TIMES DAILY
Qty: 120 TABLET | Refills: 5 | Status: SHIPPED | OUTPATIENT
Start: 2021-10-25

## 2021-10-25 NOTE — PROGRESS NOTES
NEUROLOGICAL EVALUATION:   Referring Physician: Dr. Edda Dooley, Dr. Hannah Simpson  Diagnosis: Reduced mobility; Generalized-onset seizures ([facility] Impending cerebrovascular accident [facility]);  Cancer [facility])    Date of Service: 10/25/2021     PATIENT SUMMARY   Ma Patient denies any falls since returning home, but balance does feel off. She does not use any AD, but will sometimes hold onto her . Patient denies dizziness and HAs. Has a history of limited L peripheral vision post-CVA.  Admits to feelings of co physical therapy services on 10/25/21 with primary c/o L-sided weakness, imbalance, and improving R hip/leg pain following hospitalization for status ellipticus.  Limited assessment to patient tolerance and opted to hold on assessment of all measures as pat as needed    BABS AROM and Strength   (* denotes performed with pain)  Hip AROM MMT (-/5)     Flexion WNL R 5; L 4     Extension WNL R 5; L 4     Abduction WNL R 5; L 4   Knee AROM MMT (-/5)     Flexion WNL R 5; L 4+     Extension WNL 5 BABS   Foot / Ankle A Patient will improve LLE strength to at least 4+/5 throughout to improve ability to negotiate steps and curbs without UE assist.   2. Patient will improve SLS to 20 sec or better BABS to improve ability to tolerate stance phase of stepping onto curbs withou

## 2021-10-25 NOTE — PROGRESS NOTES
Dx: Reduced mobility (Z74.09)  Generalized-onset seizures (HCC) (R56.9)  Impending [facility] ([facility] (I63.9)  Cancer ([facility] (C80.1)             Insurance (Authorized # of Visits):  5/12 [employer]          Authorizing Physician: Dr. Lencho Rivera  Next Supination:  R=5/5; L=4+/5  Wrist Flexion:  R=5/5; L=4+/5  Wrist Extension:  R=5/5; L=4+/5     Strength (lbs) Right Average Left Average   : 44.0 lbs 33.0 lbs   2 pt Pinch: 7.0 lbs 4.0 lbs   3 pt Pinch: 10.0 lbs 5.0 lbs   Lateral Pinch: 9.0 lbs 8.0 lbs FMC/In-Hand Manipulation Sorting and Coordination Activity w/ 4 marbles at a time  -[employer] Exercises for forearm pronation, supination, wrist flexion, wrist extension, clockwise turn, counterclockwise turn to 2 sets of 10 reps each  -Green Digiflex Fu

## 2021-10-27 ENCOUNTER — OFFICE VISIT (OUTPATIENT)
Dept: PHYSICAL THERAPY | Facility: HOSPITAL | Age: 56
End: 2021-10-27
Payer: COMMERCIAL

## 2021-10-27 ENCOUNTER — TELEPHONE (OUTPATIENT)
Dept: PHYSICAL THERAPY | Facility: HOSPITAL | Age: 56
End: 2021-10-27

## 2021-10-27 ENCOUNTER — APPOINTMENT (OUTPATIENT)
Dept: OCCUPATIONAL MEDICINE | Facility: HOSPITAL | Age: 56
End: 2021-10-27
Payer: COMMERCIAL

## 2021-10-27 DIAGNOSIS — C80.1 CANCER (HCC): ICD-10-CM

## 2021-10-27 DIAGNOSIS — Z74.09 REDUCED MOBILITY: ICD-10-CM

## 2021-10-27 DIAGNOSIS — I63.9 IMPENDING CEREBROVASCULAR ACCIDENT (HCC): ICD-10-CM

## 2021-10-27 DIAGNOSIS — R56.9 GENERALIZED-ONSET SEIZURES (HCC): ICD-10-CM

## 2021-10-27 PROCEDURE — 97110 THERAPEUTIC EXERCISES: CPT

## 2021-10-27 PROCEDURE — 97112 NEUROMUSCULAR REEDUCATION: CPT

## 2021-10-27 NOTE — PROGRESS NOTES
Dx: Reduced mobility; Generalized-onset seizures ([facility] Impending cerebrovascular accident [facility]);  Cancer [facility])            Insurance (Authorized # of Visits):  12 requested         Authorizing Physician: Dr. Ramiro Hoffman  Next MD visit: none scheduled  Fall Ri demonstrate improved postural control by performing romberg on airex with EC x 30 sec. 5. Patient will be IND and compliant in HEP to maintain progress made in PT. Plan: Continue PT with progression as indicated.    Date: 10/27/2021  [place]#: 2/12 Date:

## 2021-11-01 ENCOUNTER — OFFICE VISIT (OUTPATIENT)
Dept: PHYSICAL THERAPY | Facility: HOSPITAL | Age: 56
End: 2021-11-01
Payer: COMMERCIAL

## 2021-11-01 ENCOUNTER — OFFICE VISIT (OUTPATIENT)
Dept: OCCUPATIONAL MEDICINE | Facility: HOSPITAL | Age: 56
End: 2021-11-01
Payer: COMMERCIAL

## 2021-11-01 DIAGNOSIS — C80.1 CANCER (HCC): ICD-10-CM

## 2021-11-01 DIAGNOSIS — R56.9 GENERALIZED-ONSET SEIZURES (HCC): ICD-10-CM

## 2021-11-01 DIAGNOSIS — Z74.09 REDUCED MOBILITY: ICD-10-CM

## 2021-11-01 DIAGNOSIS — I63.9 IMPENDING CEREBROVASCULAR ACCIDENT (HCC): ICD-10-CM

## 2021-11-01 PROCEDURE — 97112 NEUROMUSCULAR REEDUCATION: CPT

## 2021-11-01 PROCEDURE — 97110 THERAPEUTIC EXERCISES: CPT

## 2021-11-01 PROCEDURE — 97530 THERAPEUTIC ACTIVITIES: CPT

## 2021-11-01 NOTE — PROGRESS NOTES
Dx: Reduced mobility (Z74.09)  Generalized-onset seizures (HCC) (R56.9)  Impending cerebrovascular accident ([facility] (I63.9)  Cancer ([facility] (C80.1)             Insurance (Authorized # of Visits):  6/12 [address]          Authorizing Physician: Dr. Fatemeh Marion MD R=5/5; L=4+/5  Wrist Flexion:  R=5/5; L=4+/5  Wrist Extension:  R=5/5; L=4+/5     Strength (lbs) Right Average Left Average   : 44.0 lbs 33.0 lbs   2 pt Pinch: 7.0 lbs 4.0 lbs   3 pt Pinch: 10.0 lbs 5.0 lbs   Lateral Pinch: 9.0 lbs 8.0 lbs      SPECIAL Strengthening Exercises LUE for pulling, ball formation, and flattening pinch  -Sensation Testing with multiple surfaces and/monofilament testing  -Perfection FMC/In-Hand Manipulation and Coordination Sorting Activity w/ L side visual accommodation  -Spot

## 2021-11-01 NOTE — PROGRESS NOTES
Dx: Reduced mobility; Generalized-onset seizures ([facility] Impending cerebrovascular accident [facility]);  Cancer [facility])            Insurance (Authorized # of Visits):  12 requested         Authorizing Physician: Dr. Van Rosas  Next MD visit: none scheduled  Fall Ris task.       Goals: (To be met in 12 visits)  1. Patient will improve LLE strength to at least 4+/5 throughout to improve ability to negotiate steps and curbs without UE assist. - progress  2.  Patient will improve SLS to 20 sec or better BABS to improve abil - -      HEP: STS no UEs, mod tandem stance, romberg with EC    Charges: NR x 2, Therex x 1       Total Timed Treatment: 40 min  Total Treatment Time: 40 min

## 2021-11-03 ENCOUNTER — APPOINTMENT (OUTPATIENT)
Dept: PHYSICAL THERAPY | Facility: HOSPITAL | Age: 56
End: 2021-11-03
Payer: COMMERCIAL

## 2021-11-03 ENCOUNTER — APPOINTMENT (OUTPATIENT)
Dept: OCCUPATIONAL MEDICINE | Facility: HOSPITAL | Age: 56
End: 2021-11-03
Payer: COMMERCIAL

## 2021-11-03 ENCOUNTER — TELEPHONE (OUTPATIENT)
Dept: PHYSICAL THERAPY | Facility: HOSPITAL | Age: 56
End: 2021-11-03

## 2021-11-08 ENCOUNTER — APPOINTMENT (OUTPATIENT)
Dept: PHYSICAL THERAPY | Facility: HOSPITAL | Age: 56
End: 2021-11-08
Payer: COMMERCIAL

## 2021-11-08 ENCOUNTER — APPOINTMENT (OUTPATIENT)
Dept: OCCUPATIONAL MEDICINE | Facility: HOSPITAL | Age: 56
End: 2021-11-08
Payer: COMMERCIAL

## 2021-11-08 ENCOUNTER — TELEPHONE (OUTPATIENT)
Dept: PHYSICAL THERAPY | Facility: HOSPITAL | Age: 56
End: 2021-11-08

## 2021-11-10 ENCOUNTER — APPOINTMENT (OUTPATIENT)
Dept: OCCUPATIONAL MEDICINE | Facility: HOSPITAL | Age: 56
End: 2021-11-10
Payer: COMMERCIAL

## 2021-11-10 ENCOUNTER — APPOINTMENT (OUTPATIENT)
Dept: PHYSICAL THERAPY | Facility: HOSPITAL | Age: 56
End: 2021-11-10
Payer: COMMERCIAL

## 2021-11-15 ENCOUNTER — OFFICE VISIT (OUTPATIENT)
Dept: OCCUPATIONAL MEDICINE | Facility: HOSPITAL | Age: 56
End: 2021-11-15
Payer: COMMERCIAL

## 2021-11-15 ENCOUNTER — OFFICE VISIT (OUTPATIENT)
Dept: PHYSICAL THERAPY | Facility: HOSPITAL | Age: 56
End: 2021-11-15
Payer: COMMERCIAL

## 2021-11-15 DIAGNOSIS — C80.1 CANCER (HCC): ICD-10-CM

## 2021-11-15 DIAGNOSIS — Z74.09 REDUCED MOBILITY: ICD-10-CM

## 2021-11-15 DIAGNOSIS — R56.9 GENERALIZED-ONSET SEIZURES (HCC): ICD-10-CM

## 2021-11-15 DIAGNOSIS — I63.9 IMPENDING CEREBROVASCULAR ACCIDENT (HCC): ICD-10-CM

## 2021-11-15 PROCEDURE — 97110 THERAPEUTIC EXERCISES: CPT

## 2021-11-15 PROCEDURE — 97530 THERAPEUTIC ACTIVITIES: CPT

## 2021-11-15 PROCEDURE — 97112 NEUROMUSCULAR REEDUCATION: CPT

## 2021-11-15 NOTE — PROGRESS NOTES
Dx: Reduced mobility (Z74.09)  Generalized-onset seizures (HCC) (R56.9)  Impending cerebrovascular accident ([facility] (I63.9)  Cancer ([facility] (C80.1)             Insurance (Authorized # of Visits):  7/12 [address]          Authorizing Physician: Dr. Tita Marion MD Pronation:  R=5/5; L=4+/5  Forearm Supination:  R=5/5; L=4+/5  Wrist Flexion:  R=5/5; L=4+/5  Wrist Extension:  R=5/5; L=4+/5     Strength (lbs) Right Average Left Average   : 44.0 lbs 33.0 lbs   2 pt Pinch: 7.0 lbs 4.0 lbs   3 pt Pinch: 10.0 lbs 5.0 l -Large Red Theraputty Strengthening Exercises LUE for pulling, ball formation, and flattening pinch  -Sensation Testing with multiple surfaces and/monofilament testing  -Perfection FMC/In-Hand Manipulation and Coordination Sorting Activity w/ L side visu Yellow Theraputty Strengthening Exercises; Arjun GUZMAN Exercises    Charges: Alejandra Chavez    Total Timed Treatment: 45 min  Total Treatment Time: 45 min

## 2021-11-15 NOTE — PROGRESS NOTES
Dx: Reduced mobility; Generalized-onset seizures ([facility] Impending cerebrovascular accident [facility]);  Cancer [facility])            Insurance (Authorized # of Visits):  12 requested         Authorizing Physician: Dr. Ronny Kilgore  Next MD visit: none scheduled  Fall Ri results in appearance of uneven/disturbed gait. Goals: (To be met in 12 visits)  1. Patient will improve LLE strength to at least 4+/5 throughout to improve ability to negotiate steps and curbs without UE assist. - progress  2.  Patient will improve S - mod tandem with multi-direct catch x 15 R/L  - 3/4 tandem on level with R/L HT x 15 R/L  - walking out of // bars with R/L HT 50ft x 2 (focus on normal RIGOBERTO and step length) NR  - romberg on airex with EC 2 x 20 sec SBA-CGA; progressed to mod tandem on

## 2021-11-16 ENCOUNTER — OFFICE VISIT (OUTPATIENT)
Dept: NEUROLOGY | Facility: CLINIC | Age: 56
End: 2021-11-16
Payer: COMMERCIAL

## 2021-11-16 VITALS
SYSTOLIC BLOOD PRESSURE: 104 MMHG | BODY MASS INDEX: 19 KG/M2 | RESPIRATION RATE: 15 BRPM | DIASTOLIC BLOOD PRESSURE: 62 MMHG | WEIGHT: 104 LBS | HEART RATE: 66 BPM

## 2021-11-16 DIAGNOSIS — I69.30 CHRONIC ISCHEMIC RIGHT PCA STROKE: ICD-10-CM

## 2021-11-16 DIAGNOSIS — M54.16 LUMBAR RADICULOPATHY: ICD-10-CM

## 2021-11-16 DIAGNOSIS — G40.109 SYMPTOMATIC LOCALIZATION-RELATED EPILEPSY (HCC): Primary | ICD-10-CM

## 2021-11-16 DIAGNOSIS — R20.2 NUMBNESS AND TINGLING IN LEFT HAND: ICD-10-CM

## 2021-11-16 DIAGNOSIS — R20.0 NUMBNESS AND TINGLING IN LEFT HAND: ICD-10-CM

## 2021-11-16 PROCEDURE — 3078F DIAST BP <80 MM HG: CPT | Performed by: OTHER

## 2021-11-16 PROCEDURE — 3074F SYST BP LT 130 MM HG: CPT | Performed by: OTHER

## 2021-11-16 PROCEDURE — 99214 OFFICE O/P EST MOD 30 MIN: CPT | Performed by: OTHER

## 2021-11-16 RX ORDER — GABAPENTIN 100 MG/1
300 CAPSULE ORAL NIGHTLY
Qty: 90 CAPSULE | Refills: 1 | Status: SHIPPED | OUTPATIENT
Start: 2021-11-16 | End: 2022-01-18

## 2021-11-16 NOTE — PROGRESS NOTES
Neurology H&P    [place] Patient Status:  No patient class for patient encounter    1965 MRN [id number]   Location [address] [place] [address], [address] [place] [address] Drive, [facility] Attending No att. providers found   Hosp Day # 0 PCP Kartik Brown DO Oral Tab Take 1 tablet (10 mg total) by mouth nightly.  30 tablet 1   • SERTRALINE 100 MG Oral Tab TAKE ONE TABLET BY MOUTH DAILY 90 tablet 0   • FOLIC ACID 1 MG Oral Tab TAKE ONE TABLET BY MOUTH DAILY 90 tablet 0   • multivitamin Oral Tab Take 1 tablet by heart; head   • History of ETOH abuse    • Pneumonia, organism unspecified(486)     pt denies having this 10/29/19   • Psychiatric disorder    • Seizure disorder [facility])    • Stroke ([facility] 04/2015   • Unspecified essential hypertension        PSHx:  Past Surg 5/5       RLE: HF: 4-/5, HE: 5/5, KF: 5/5, KE: 5/5        LLE: HF: 4/5, HE: 5/5, KF: 5/5, KE: 5/5        Normal tone       Normal muscle bulk    Sens:        UE: Intact to light touch and pinprick throughout       LE: Inact to light touch, pinprick intact intracranial segments of the artery are normal.  The anterior and middle cerebral arteries and branches are normal.  An azygos A2 segment is seen which is a variant of normal.  There is a   reflux into the left a 1 segment, left supra clinoid ICA and left from 7/17/2018. There are focal areas of gradient susceptibility scattered in the medial right temporal lobe and right occipital lobe that likely represent areas of petechial hemorrhage. This can be followed   with CT.      2. There is a punctate area of Keppra 1500mg BID        - Will give 750 tabs as they are easier for her to swallow  - Continue Vimpat 100mg BID       3.  RLE radiating pains  - MRI L spine reviewed  - Start gabapentin 300mg at bedtime      4. R hand numbness in 1st-2nd digits  - Maybe CT

## 2021-11-16 NOTE — PATIENT INSTRUCTIONS
After your visit at the [facility] office today,  please direct any follow up questions or medication needs to the staff in our [place] office so that your concerns may be promptly addressed.   We are available through [employer] or at the numbers below: picked up in office. • Please allow the office 2-3 business days to fill the prescription. • Patient must present photo ID at time of . PLEASE NOTE: PRESCRIPTIONS MUST BE PICKED UP PRIOR TO 3:00PM MONDAY-FRIDAY    Scheduling Tests:     If your ph submitting forms to office staff. • Form completion may require an additional fee. • A signed Release of Information (DHRUV) must be on file before forms may be submitted. When dropping off forms, please ask the  for this paper.    • Failure

## 2021-11-17 ENCOUNTER — OFFICE VISIT (OUTPATIENT)
Dept: PHYSICAL THERAPY | Facility: HOSPITAL | Age: 56
End: 2021-11-17
Payer: COMMERCIAL

## 2021-11-17 DIAGNOSIS — R56.9 GENERALIZED-ONSET SEIZURES (HCC): ICD-10-CM

## 2021-11-17 DIAGNOSIS — Z74.09 REDUCED MOBILITY: ICD-10-CM

## 2021-11-17 DIAGNOSIS — I63.9 IMPENDING CEREBROVASCULAR ACCIDENT (HCC): ICD-10-CM

## 2021-11-17 DIAGNOSIS — C80.1 CANCER (HCC): ICD-10-CM

## 2021-11-17 PROCEDURE — 97112 NEUROMUSCULAR REEDUCATION: CPT

## 2021-11-17 PROCEDURE — 97110 THERAPEUTIC EXERCISES: CPT

## 2021-11-17 NOTE — PROGRESS NOTES
Dx: Reduced mobility; Generalized-onset seizures ([facility] Impending cerebrovascular accident [facility]);  Cancer [facility])            Insurance (Authorized # of Visits):  12 requested         Authorizing Physician: Dr. Taye Davenport  Next MD visit: none scheduled  Fall Ri WB and can easily be incorporated, but may require focused strengthening in SL as well, will f/u on this next session.  With dynamic gait walking, patient with tendency to decrease speed, widen RIGOBERTO, and decrease step length BABS; with cueing, able to correct SBA-Luis Alberto  - SLS alt tap to cone, intermittent Luis Alberto, focus on stabilization x 25  - mod tandem on airex 3 x 20 sec R/L back SBA-CGA NR  - airex step ups, no UEs x 15 R/L   - mod tandem on airex with EC, 3 x 20 sec R/L back   - SLS alt tap to cone 2 x 10, no

## 2021-11-22 ENCOUNTER — APPOINTMENT (OUTPATIENT)
Dept: PHYSICAL THERAPY | Facility: HOSPITAL | Age: 56
End: 2021-11-22
Payer: COMMERCIAL

## 2021-11-24 ENCOUNTER — APPOINTMENT (OUTPATIENT)
Dept: PHYSICAL THERAPY | Facility: HOSPITAL | Age: 56
End: 2021-11-24
Payer: COMMERCIAL

## 2021-11-29 ENCOUNTER — APPOINTMENT (OUTPATIENT)
Dept: PHYSICAL THERAPY | Facility: HOSPITAL | Age: 56
End: 2021-11-29
Payer: COMMERCIAL

## 2021-12-01 ENCOUNTER — APPOINTMENT (OUTPATIENT)
Dept: PHYSICAL THERAPY | Facility: HOSPITAL | Age: 56
End: 2021-12-01
Payer: COMMERCIAL

## 2021-12-06 ENCOUNTER — OFFICE VISIT (OUTPATIENT)
Dept: OCCUPATIONAL MEDICINE | Facility: HOSPITAL | Age: 56
End: 2021-12-06
Attending: FAMILY MEDICINE
Payer: COMMERCIAL

## 2021-12-06 ENCOUNTER — OFFICE VISIT (OUTPATIENT)
Dept: PHYSICAL THERAPY | Facility: HOSPITAL | Age: 56
End: 2021-12-06
Payer: COMMERCIAL

## 2021-12-06 PROCEDURE — 97112 NEUROMUSCULAR REEDUCATION: CPT

## 2021-12-06 PROCEDURE — 97110 THERAPEUTIC EXERCISES: CPT

## 2021-12-06 PROCEDURE — 97530 THERAPEUTIC ACTIVITIES: CPT

## 2021-12-06 NOTE — PROGRESS NOTES
Discharge Summary  Pt has attended 6 visits in Physical Therapy. Dx: Reduced mobility; Generalized-onset seizures ([facility] Impending cerebrovascular accident [facility]);  Cancer [facility])            Insurance (Authorized # of Visits):  12 requested         Auth - SLS: R 13 sec, L 7 sec                           Fall Risk: yes, both required multiple attempts     Functional Mobility:  30 sec sit<>stand: 15x, no UEs                             Fall Risk: no  Timed Up and Go (no AD, time): 8 sec  Fall Risk: no  FGA without UE assist. - progressing, delayed due to sacral insufficiency fracture  2. Patient will improve SLS to 20 sec or better BABS to improve ability to tolerate stance phase of stepping onto curbs without assist. - progressing, not met  3.  Patient will i climbing, flight of stairs x 2 with 1HRA   - YTB side stepping x 2 laps // bars  Therex   - NuStep L6 x 5 min  - heel/ankle screen with edu calf/PF stretching   NR  - airex step ups, no UEs SBA-CGA 2 x 10 R/L   - tandem walking in // bars, attempting no UE POC, HEP, and HEP progression; reviewed HEP   - - - -    - - - -    HEP: STS no UEs, tandem stance, mod tandem stance with EC, standing calf stretch, calf stretch with towel    Charges: NR x 2, Therex x 1       Total Timed Treatment: 40 min  Total Treatmen

## 2021-12-06 NOTE — PROGRESS NOTES
Discharge Summary  Dx: Reduced mobility (Z74.09)  Generalized-onset seizures (HCC) (R56.9)  Impending cerebrovascular accident ([facility] (I63.9)  Cancer ([facility] (C80.1)             Insurance (Authorized # of Visits):  8/12 [address] [place] [address] Physician: administered 10/01/2021  Visuospatial/Executive: 2/5 (missed line connecting, cube copying, and hands area for clock drawing test)  Naming: 3/3  Attention: 4/6 (only 1 item on serial 7's)  Language: 3/3  Abstraction: 2/2  Delayed Recall: 4/5  Orientation: precautions, and treatment options and has agreed to actively participate in planning and for this course of care. Thank you for your referral. If you have any questions, please contact me at Dept: [phone].     Sincerely,  Electronically signed by brenda supination, wrist flexion, wrist extension 1 set of 10 reps each  -Blue Digiflex Full  1 set of 10 reps;  Red Digiflex Individual Digit 1 set of 10 reps  -Review of HEP and POC     HEP: Tendon Gliding Exercises, Thumb AROM Exercises, Yellow Theraputty S

## 2021-12-10 DIAGNOSIS — I63.50 CEREBRAL ARTERY OCCLUSION WITH CEREBRAL INFARCTION (HCC): ICD-10-CM

## 2021-12-10 RX ORDER — ATORVASTATIN CALCIUM 40 MG/1
TABLET, FILM COATED ORAL
Qty: 90 TABLET | Refills: 0 | Status: SHIPPED | OUTPATIENT
Start: 2021-12-10

## 2021-12-10 NOTE — TELEPHONE ENCOUNTER
Medication: ATORVASTATIN 40 MG Oral Tab     Date of last refill: 09/07/2021 (#90/0)-discon  [place]     Last office visit: 11/16/2021  Due back to clinic per last office note:  Around 03/16/2022  Date next office vis

## 2021-12-23 DIAGNOSIS — G40.909 SEIZURE DISORDER (HCC): ICD-10-CM

## 2021-12-23 NOTE — TELEPHONE ENCOUNTER
Medication: VIMPAT 100 MG Oral Tab     Date of last refill: 10/25/21 (#120/0)  [place]     Last office visit: 11/16/21    Due back to clinic per last office note:  Return in about 4 months (around 3/16/2022).       Ursula Fair my ability.        David Jorgensen, DO  Neurology

## 2021-12-23 NOTE — COVID NURSING ASSESSMENT
COVID-19 Daily Discharge Readiness-Nursing  Temperature max from last 24 hrs: Temp (24hrs), Av.4 °F (36.9 °C), Min:98.2 °F (36.8 °C), Max:98.8 °F (37.1 °C)    Inflammatory Markers: No results for input(s): CRP, HU, DDIMER in the last 168 hours.       P FT, NVSD FT, planned C/S

## 2021-12-27 RX ORDER — LACOSAMIDE 100 MG/1
TABLET, FILM COATED ORAL
Qty: 120 TABLET | Refills: 0 | Status: SHIPPED | OUTPATIENT
Start: 2021-12-27

## 2022-01-18 DIAGNOSIS — R20.2 NUMBNESS AND TINGLING IN LEFT HAND: Primary | ICD-10-CM

## 2022-01-18 DIAGNOSIS — R20.0 NUMBNESS AND TINGLING IN LEFT HAND: Primary | ICD-10-CM

## 2022-01-18 NOTE — TELEPHONE ENCOUNTER
Medication: GABAPENTIN 100 MG Oral Cap    Date of last refill: 11/16/2021 (#90/1)  [place]     Last office visit: 11/16/2021  Due back to clinic per last office note:  Around 03/16/2022  Date next office visit schedu

## 2022-01-19 RX ORDER — GABAPENTIN 100 MG/1
CAPSULE ORAL
Qty: 90 CAPSULE | Refills: 2 | Status: SHIPPED | OUTPATIENT
Start: 2022-01-19

## 2022-03-02 RX ORDER — LACOSAMIDE 100 MG/1
TABLET, FILM COATED ORAL
Qty: 120 TABLET | Refills: 0 | OUTPATIENT
Start: 2022-03-02

## 2022-03-02 NOTE — TELEPHONE ENCOUNTER
RN called [address] in [place] and they can complete a partial fill of the VIMPAT and will be able to complete a full fill by noon today. RN called the [address] [place] [address] Way and informed of the cancellation of the VIMPAT to [address] [place] [address] Drive and VORAVERY to [facility]. Patient's spouse called and informed of above and appt made for patient.

## 2022-03-02 NOTE — TELEPHONE ENCOUNTER
Patients Spouse states they are needing this medication filled ASAP, patient is completely out and is needing to take by 10 am this morning. Please also note change in pharmacy. Patient would like a call from clinical staff once this has been filled. Patient calling to request refill of VIMPAT [address], [address] [place] [address] [phone], [phone]    Patient informed of 48 hour refill policy excluding weekends and holidays. Informed patient prescription is sent directly to pharmacy. Further explained patient will not receive a call back once prescription is ready.

## 2022-04-02 ENCOUNTER — APPOINTMENT (OUTPATIENT)
Dept: GENERAL RADIOLOGY | Facility: HOSPITAL | Age: 57
End: 2022-04-02
Attending: EMERGENCY MEDICINE
Payer: COMMERCIAL

## 2022-04-02 ENCOUNTER — HOSPITAL ENCOUNTER (EMERGENCY)
Facility: HOSPITAL | Age: 57
Discharge: HOME | End: 2022-04-02
Attending: EMERGENCY MEDICINE
Payer: COMMERCIAL

## 2022-04-02 ENCOUNTER — APPOINTMENT (OUTPATIENT)
Dept: CT IMAGING | Facility: HOSPITAL | Age: 57
End: 2022-04-02
Attending: EMERGENCY MEDICINE
Payer: COMMERCIAL

## 2022-04-02 VITALS
HEIGHT: 62 IN | SYSTOLIC BLOOD PRESSURE: 108 MMHG | RESPIRATION RATE: 16 BRPM | OXYGEN SATURATION: 98 % | HEART RATE: 83 BPM | TEMPERATURE: 98 F | WEIGHT: 106 LBS | BODY MASS INDEX: 19.51 KG/M2 | DIASTOLIC BLOOD PRESSURE: 86 MMHG

## 2022-04-02 DIAGNOSIS — S09.90XA INJURY OF HEAD, INITIAL ENCOUNTER: Primary | ICD-10-CM

## 2022-04-02 DIAGNOSIS — S39.012A LUMBAR STRAIN, INITIAL ENCOUNTER: ICD-10-CM

## 2022-04-02 DIAGNOSIS — S32.010A COMPRESSION FRACTURE OF L1 VERTEBRA, INITIAL ENCOUNTER (HCC): ICD-10-CM

## 2022-04-02 PROCEDURE — 99284 EMERGENCY DEPT VISIT MOD MDM: CPT

## 2022-04-02 PROCEDURE — 70450 CT HEAD/BRAIN W/O DYE: CPT | Performed by: EMERGENCY MEDICINE

## 2022-04-02 PROCEDURE — 72110 X-RAY EXAM L-2 SPINE 4/>VWS: CPT | Performed by: EMERGENCY MEDICINE

## 2022-04-02 NOTE — ED INITIAL ASSESSMENT (HPI)
Pt to er with spouse  States fell out of bed and into a wall last noc at 2100  No loc  Pain now at back and head  Took tylenol with some relief  Posterior head pain 4/10  On eliquois for stroke 2015

## 2022-04-14 RX ORDER — LEVETIRACETAM 750 MG/1
TABLET ORAL
Qty: 120 TABLET | Refills: 0 | Status: SHIPPED | OUTPATIENT
Start: 2022-04-14 | End: 2022-04-15

## 2022-04-15 ENCOUNTER — OFFICE VISIT (OUTPATIENT)
Dept: NEUROLOGY | Facility: CLINIC | Age: 57
End: 2022-04-15
Payer: COMMERCIAL

## 2022-04-15 VITALS
SYSTOLIC BLOOD PRESSURE: 94 MMHG | BODY MASS INDEX: 20 KG/M2 | HEART RATE: 64 BPM | WEIGHT: 110 LBS | DIASTOLIC BLOOD PRESSURE: 60 MMHG

## 2022-04-15 DIAGNOSIS — I69.30 CHRONIC ISCHEMIC RIGHT PCA STROKE: ICD-10-CM

## 2022-04-15 DIAGNOSIS — G40.909 SEIZURE DISORDER (HCC): Primary | ICD-10-CM

## 2022-04-15 PROCEDURE — 3074F SYST BP LT 130 MM HG: CPT | Performed by: OTHER

## 2022-04-15 PROCEDURE — 3078F DIAST BP <80 MM HG: CPT | Performed by: OTHER

## 2022-04-15 PROCEDURE — 99213 OFFICE O/P EST LOW 20 MIN: CPT | Performed by: OTHER

## 2022-04-15 RX ORDER — LEVETIRACETAM 750 MG/1
1500 TABLET ORAL 2 TIMES DAILY
Qty: 120 TABLET | Refills: 0 | Status: SHIPPED | OUTPATIENT
Start: 2022-04-15

## 2022-04-25 NOTE — PLAN OF CARE
Pt alert and oriented. Denies pain. No c/o numbness, tingling. L field cut. Neuro checks q4h. NIH.  VSS. Afebrile. IVF. BG monitored. Seizure precautions in place, no seizure activity noted. Plan for possible dc home today. Will monitor closely. status  Description  INTERVENTIONS  - Assess for and report changes in neurological status  - Initiate measures to prevent increased intracranial pressure  - Maintain blood pressure and fluid volume within ordered parameters to optimize cerebral perfusion schedule  Outcome: Progressing Yes

## 2022-05-16 RX ORDER — LEVETIRACETAM 750 MG/1
TABLET ORAL
Qty: 120 TABLET | Refills: 2 | Status: SHIPPED | OUTPATIENT
Start: 2022-05-16

## 2022-06-14 DIAGNOSIS — G40.909 SEIZURE DISORDER (HCC): ICD-10-CM

## 2022-06-14 RX ORDER — LEVETIRACETAM 750 MG/1
TABLET ORAL
Qty: 120 TABLET | Refills: 2 | Status: SHIPPED | OUTPATIENT
Start: 2022-06-14

## 2022-06-21 ENCOUNTER — TELEPHONE (OUTPATIENT)
Dept: NEUROLOGY | Facility: CLINIC | Age: 57
End: 2022-06-21

## 2022-06-21 DIAGNOSIS — G40.909 SEIZURE DISORDER (HCC): ICD-10-CM

## 2022-06-21 NOTE — TELEPHONE ENCOUNTER
Fax received from [address] [place] [address] stating patient prefers Brand Vimpat and PA needed. MLTCB on VM (ok per HIPAA consent) to inquire why patient cannot take generic Vimpat.

## 2022-06-23 RX ORDER — LACOSAMIDE 100 MG/1
100 TABLET ORAL 2 TIMES DAILY
Qty: 60 TABLET | Refills: 4 | Status: SHIPPED | OUTPATIENT
Start: 2022-06-23

## 2022-06-23 RX ORDER — LACOSAMIDE 100 MG/1
100 TABLET ORAL 2 TIMES DAILY
Qty: 60 TABLET | Refills: 4 | Status: SHIPPED | OUTPATIENT
Start: 2022-06-23 | End: 2022-06-23

## 2022-06-23 NOTE — TELEPHONE ENCOUNTER
Pt's  states Thida script was sent to is out of this medication; pt's  req it be sent to [address] [place] [address], [address] [place] [address] [place] [phone], [phone]

## 2022-06-23 NOTE — TELEPHONE ENCOUNTER
Pt's  called requesting refill for Vimpat to be sent to [facility], [address] [place] [phone], [phone]  PSR communicated that nurse had lvmtcb re; why not generic and spouse states that was a [place]' he never requested only Vimpat brand and stated that he is ok with generic. Further statesd his wife did not request that either as stated he only takes care of medications. However he said the [facility] that sent that has nothing on the shelves so please send to pharmacy listed above. Please call with any questions. Please note that spouse states needs script today, has been wtg since Mon and cannot wait 24-48 hours for refill.

## 2022-06-23 NOTE — TELEPHONE ENCOUNTER
Pt's  states [facility] is saying they have not yet received this script; pls confirm with pharmacy and call  back

## 2022-06-23 NOTE — TELEPHONE ENCOUNTER
RN called pharmacy and spoke with staff that state the RX is ready for . RN requested that pharmacy call pt and inform that RX is ready. VU and they will call pt.

## 2022-07-11 DIAGNOSIS — G40.909 SEIZURE DISORDER (HCC): ICD-10-CM

## 2022-07-11 RX ORDER — LACOSAMIDE 100 MG/1
100 TABLET ORAL 2 TIMES DAILY
Qty: 60 TABLET | Refills: 3 | Status: SHIPPED | OUTPATIENT
Start: 2022-07-11

## 2022-07-11 NOTE — TELEPHONE ENCOUNTER
Patient calling to request refill of lacosamide (VIMPAT) 100 MG Oral Tab  Pharmacy [address] [facility], [address], [address] [phone], [phone]    Patient informed of 48 hour refill policy excluding weekends and holidays. Informed patient prescription is sent directly to pharmacy. Further explained patient will not receive a call back once prescription is ready.

## 2022-08-23 DIAGNOSIS — G40.909 SEIZURE DISORDER (HCC): ICD-10-CM

## 2022-08-23 RX ORDER — LACOSAMIDE 100 MG/1
100 TABLET ORAL 2 TIMES DAILY
Qty: 180 TABLET | Refills: 1 | Status: SHIPPED | OUTPATIENT
Start: 2022-08-23

## 2022-08-23 NOTE — TELEPHONE ENCOUNTER
Patient calling to request refill of lacosamide (VIMPAT) 100 MG Oral Tab. Pt's spouse requesting refill to be sent to   [facility], [address] [phone], [phone]   as states [address] is out of this medication.

## 2022-09-06 DIAGNOSIS — G40.909 SEIZURE DISORDER (HCC): ICD-10-CM

## 2022-09-06 RX ORDER — LEVETIRACETAM 750 MG/1
TABLET ORAL
Qty: 120 TABLET | Refills: 2 | Status: SHIPPED | OUTPATIENT
Start: 2022-09-06

## 2022-11-16 ENCOUNTER — TELEPHONE (OUTPATIENT)
Dept: SURGERY | Facility: CLINIC | Age: 57
End: 2022-11-16

## 2022-11-16 NOTE — TELEPHONE ENCOUNTER
Pt calling requesting medication refill for lacosamide (VIMPAT) 100 MG Oral Tab.     [facility] [address], [facility] [phone], [phone]

## 2022-12-19 DIAGNOSIS — G40.909 SEIZURE DISORDER (HCC): ICD-10-CM

## 2022-12-20 RX ORDER — LEVETIRACETAM 750 MG/1
TABLET ORAL
Qty: 120 TABLET | Refills: 0 | Status: SHIPPED | OUTPATIENT
Start: 2022-12-20

## 2022-12-20 RX ORDER — LEVETIRACETAM 750 MG/1
1500 TABLET ORAL 2 TIMES DAILY
Qty: 120 TABLET | Refills: 2 | Status: SHIPPED | OUTPATIENT
Start: 2022-12-20

## 2023-02-09 ENCOUNTER — OFFICE VISIT (OUTPATIENT)
Dept: NEUROLOGY | Facility: CLINIC | Age: 58
End: 2023-02-09
Payer: COMMERCIAL

## 2023-02-09 VITALS
WEIGHT: 111 LBS | SYSTOLIC BLOOD PRESSURE: 101 MMHG | HEART RATE: 84 BPM | BODY MASS INDEX: 20 KG/M2 | DIASTOLIC BLOOD PRESSURE: 66 MMHG

## 2023-02-09 DIAGNOSIS — G40.909 SEIZURE DISORDER (HCC): ICD-10-CM

## 2023-02-09 PROCEDURE — 3078F DIAST BP <80 MM HG: CPT | Performed by: OTHER

## 2023-02-09 PROCEDURE — 3074F SYST BP LT 130 MM HG: CPT | Performed by: OTHER

## 2023-02-09 PROCEDURE — 99213 OFFICE O/P EST LOW 20 MIN: CPT | Performed by: OTHER

## 2023-02-09 RX ORDER — LEVETIRACETAM 750 MG/1
1500 TABLET ORAL 2 TIMES DAILY
Qty: 120 TABLET | Refills: 2 | Status: SHIPPED | OUTPATIENT
Start: 2023-02-09

## 2023-02-09 RX ORDER — LACOSAMIDE 100 MG/1
100 TABLET ORAL 2 TIMES DAILY
Qty: 180 TABLET | Refills: 1 | Status: SHIPPED | OUTPATIENT
Start: 2023-02-09

## 2023-05-07 DIAGNOSIS — G40.909 SEIZURE DISORDER (HCC): ICD-10-CM

## 2023-05-08 RX ORDER — LEVETIRACETAM 750 MG/1
TABLET ORAL
Qty: 120 TABLET | Refills: 0 | Status: SHIPPED | OUTPATIENT
Start: 2023-05-08

## 2023-06-10 DIAGNOSIS — G40.909 SEIZURE DISORDER (HCC): ICD-10-CM

## 2023-06-12 RX ORDER — LEVETIRACETAM 750 MG/1
TABLET ORAL
Qty: 120 TABLET | Refills: 0 | Status: SHIPPED | OUTPATIENT
Start: 2023-06-12

## 2023-07-08 DIAGNOSIS — G40.909 SEIZURE DISORDER (HCC): ICD-10-CM

## 2023-07-10 RX ORDER — LEVETIRACETAM 750 MG/1
1500 TABLET ORAL 2 TIMES DAILY
Qty: 120 TABLET | Refills: 0 | Status: SHIPPED | OUTPATIENT
Start: 2023-07-10

## 2023-07-10 NOTE — TELEPHONE ENCOUNTER
Medication: LEVETIRACETAM 750 MG Oral Tab     Date of last refill: 06/12/23 (120/0)  [place] 54/61/63    Last office visit: 02/29/23  Due back to clinic per last office note:  6 months  Date next office visit scheduled:  No future appointments. Last OV note recommendation:     Plan:  1. R PCA Stroke  - Continue Eliquis 5mg BID  - Continue Atorvastatin 40mg QHS  - Follow up with PCP and cardiology     Stroke Prevention  - Limit EtOH to no more than 2 drinks per day for men and 1 for women  - Follow  A mediterranean diet  - Abstain from tobacco products  - BP goals <140/90 optimally normotensive 120/80. Use ACEI if possible  - LDL goal < 70     2.  Seizures  - Continue Keppra 1500mg BID        - Will give 750 tabs as they are easier for her to swallow  - Continue Vimpat 100mg BID

## 2023-08-07 DIAGNOSIS — G40.909 SEIZURE DISORDER (HCC): ICD-10-CM

## 2023-08-07 NOTE — TELEPHONE ENCOUNTER
LMTCB     Needs appt, once scheduled, can make sure pt has enough medication to last until appt    Medication: Keppra    Date of last refill: 7/10/23 for #120/0 additional refills  [place]    Last office visit: 2/9/23  Due back to clinic per last office note:  6 months  Date next office visit scheduled:  not scheduled    Last OV note recommendation: per Dr. Ephraim Fairbanks:    Assessment: This is a 66 y/o female with a h/o R PCA stroke in setting of coumadin non-compliance, and seizures (both from EtOH withdrawal, medication non-compliance and previous stroke). She should continue Keppra and Vimpat. Plan:  1. R PCA Stroke  - Continue Eliquis 5mg BID  - Continue Atorvastatin 40mg QHS  - Follow up with PCP and cardiology     Stroke Prevention  - Limit EtOH to no more than 2 drinks per day for men and 1 for women  - Follow  A mediterranean diet  - Abstain from tobacco products  - BP goals <140/90 optimally normotensive 120/80. Use ACEI if possible  - LDL goal < 70     2.  Seizures  - Continue Keppra 1500mg BID        - Will give 750 tabs as they are easier for her to swallow  - Continue Vimpat 100mg BID

## 2023-08-10 RX ORDER — LEVETIRACETAM 750 MG/1
1500 TABLET ORAL 2 TIMES DAILY
Qty: 120 TABLET | Refills: 2 | Status: SHIPPED | OUTPATIENT
Start: 2023-08-10

## 2023-08-21 DIAGNOSIS — G40.909 SEIZURE DISORDER (HCC): ICD-10-CM

## 2023-08-22 RX ORDER — LACOSAMIDE 100 MG/1
100 TABLET ORAL 2 TIMES DAILY
Qty: 180 TABLET | Refills: 0 | Status: SHIPPED | OUTPATIENT
Start: 2023-08-22

## 2023-08-22 NOTE — TELEPHONE ENCOUNTER
Medication: LACOSAMIDE 100 MG Oral Tab      Date of last refill: 02/09/2023 (#180/1)  [place]     Last office visit: 02/09/2023  Due back to clinic per last office note:  Around 08/09/2023  Date next office visit scheduled:    No future appointments. Last OV note recommendation:       Assessment: This is a 66 y/o female with a h/o R PCA stroke in setting of coumadin non-compliance, and seizures (both from EtOH withdrawal, medication non-compliance and previous stroke). She should continue Keppra and Vimpat. Plan:  1. R PCA Stroke  - Continue Eliquis 5mg BID  - Continue Atorvastatin 40mg QHS  - Follow up with PCP and cardiology     Stroke Prevention  - Limit EtOH to no more than 2 drinks per day for men and 1 for women  - Follow  A mediterranean diet  - Abstain from tobacco products  - BP goals <140/90 optimally normotensive 120/80. Use ACEI if possible  - LDL goal < 70     2.  Seizures  - Continue Keppra 1500mg BID        - Will give 750 tabs as they are easier for her to swallow  - Continue Vimpat 100mg BID        Jenelle Gutierrez, DO  Neurology

## 2023-11-07 DIAGNOSIS — G40.909 SEIZURE DISORDER (HCC): ICD-10-CM

## 2023-11-07 RX ORDER — LEVETIRACETAM 750 MG/1
1500 TABLET ORAL 2 TIMES DAILY
Qty: 120 TABLET | Refills: 0 | Status: SHIPPED | OUTPATIENT
Start: 2023-11-07

## 2023-11-07 NOTE — TELEPHONE ENCOUNTER
Sent the patient a React[employer] message to make an appt for further medication refills. Medication:  LEVETIRACETAM 750 MG Oral Tab      Date of last refill: 08/10/2023 (#120/2)  [place]     Last office visit: 02/09/2023  Due back to clinic per last office note:  Around 08/09/2023  Date next office visit scheduled:    No future appointments. Last OV note recommendation:    Assessment: This is a 66 y/o female with a h/o R PCA stroke in setting of coumadin non-compliance, and seizures (both from EtOH withdrawal, medication non-compliance and previous stroke). She should continue Keppra and Vimpat. Plan:  1. R PCA Stroke  - Continue Eliquis 5mg BID  - Continue Atorvastatin 40mg QHS  - Follow up with PCP and cardiology     Stroke Prevention  - Limit EtOH to no more than 2 drinks per day for men and 1 for women  - Follow  A mediterranean diet  - Abstain from tobacco products  - BP goals <140/90 optimally normotensive 120/80. Use ACEI if possible  - LDL goal < 70     2.  Seizures  - Continue Keppra 1500mg BID        - Will give 750 tabs as they are easier for her to swallow  - Continue Vimpat 100mg BID        Jose Cruz Simon, DO  Neurology

## 2023-11-08 ENCOUNTER — PATIENT MESSAGE (OUTPATIENT)
Dept: NEUROLOGY | Facility: CLINIC | Age: 58
End: 2023-11-08

## 2023-11-08 DIAGNOSIS — G40.909 SEIZURE DISORDER (HCC): Primary | ICD-10-CM

## 2023-11-08 NOTE — TELEPHONE ENCOUNTER
From: Violette Anderson  To: Rupesh Syed  Sent: 11/8/2023 10:19 AM CST  Subject: Lenora Almeida levels    Dr. Riki Oneill primary did a Keppra level test that indicated too high and wanted us to contact you. Can you check her labs and let us know your thoughts? ..we also need to get for a visit. Her prescription needs to renew.

## 2023-11-08 NOTE — TELEPHONE ENCOUNTER
Per Epic review in [facility] Care Everywhere,, Keppra level was 97.8 on 11/2/23. Messages routed to Dr Michaelle Tsang for advisement.

## 2023-11-11 DIAGNOSIS — G40.909 SEIZURE DISORDER (HCC): ICD-10-CM

## 2023-11-13 RX ORDER — LACOSAMIDE 100 MG/1
100 TABLET ORAL 2 TIMES DAILY
Qty: 180 TABLET | Refills: 0 | Status: SHIPPED | OUTPATIENT
Start: 2023-11-13

## 2023-11-13 NOTE — TELEPHONE ENCOUNTER
Medication: LACOSAMIDE 100 MG Oral Tab      Date of last refill: 08/22/2023 (#180/0)  [place]     Last office visit: 02/09/2023  Due back to clinic per last office note:  Around 08/09/2023  Date next office visit scheduled:    Future Appointments   Date Time Provider David Lila   12/22/2023  8:40 AM DO LACHO Carranza [facility] note recommendation:    Assessment: This is a 64 y/o female with a h/o R PCA stroke in setting of coumadin non-compliance, and seizures (both from EtOH withdrawal, medication non-compliance and previous stroke). She should continue Keppra and Vimpat. Plan:  1. R PCA Stroke  - Continue Eliquis 5mg BID  - Continue Atorvastatin 40mg QHS  - Follow up with PCP and cardiology     Stroke Prevention  - Limit EtOH to no more than 2 drinks per day for men and 1 for women  - Follow  A mediterranean diet  - Abstain from tobacco products  - BP goals <140/90 optimally normotensive 120/80. Use ACEI if possible  - LDL goal < 70     2.  Seizures  - Continue Keppra 1500mg BID        - Will give 750 tabs as they are easier for her to swallow  - Continue Vimpat 100mg BID        Kassy Gonzalez DO  Neurology

## 2023-11-16 ENCOUNTER — APPOINTMENT (OUTPATIENT)
Dept: CT IMAGING | Facility: HOSPITAL | Age: 58
End: 2023-11-16
Attending: EMERGENCY MEDICINE
Payer: COMMERCIAL

## 2023-11-16 ENCOUNTER — LAB ENCOUNTER (OUTPATIENT)
Dept: LAB | Facility: HOSPITAL | Age: 58
End: 2023-11-16
Attending: Other
Payer: COMMERCIAL

## 2023-11-16 ENCOUNTER — HOSPITAL ENCOUNTER (EMERGENCY)
Facility: HOSPITAL | Age: 58
Discharge: HOME | End: 2023-11-16
Attending: EMERGENCY MEDICINE
Payer: COMMERCIAL

## 2023-11-16 VITALS
HEIGHT: 62 IN | RESPIRATION RATE: 14 BRPM | HEART RATE: 64 BPM | TEMPERATURE: 97 F | DIASTOLIC BLOOD PRESSURE: 81 MMHG | OXYGEN SATURATION: 100 % | WEIGHT: 116 LBS | BODY MASS INDEX: 21.35 KG/M2 | SYSTOLIC BLOOD PRESSURE: 116 MMHG

## 2023-11-16 DIAGNOSIS — G40.909 SEIZURE DISORDER (HCC): ICD-10-CM

## 2023-11-16 DIAGNOSIS — S09.90XA INJURY OF HEAD, INITIAL ENCOUNTER: Primary | ICD-10-CM

## 2023-11-16 PROCEDURE — 36415 COLL VENOUS BLD VENIPUNCTURE: CPT

## 2023-11-16 PROCEDURE — 99284 EMERGENCY DEPT VISIT MOD MDM: CPT

## 2023-11-16 PROCEDURE — 80177 DRUG SCRN QUAN LEVETIRACETAM: CPT

## 2023-11-16 PROCEDURE — 70450 CT HEAD/BRAIN W/O DYE: CPT | Performed by: EMERGENCY MEDICINE

## 2023-11-16 NOTE — ED INITIAL ASSESSMENT (HPI)
Pt states she got up to use the bathroom around 0400, \"I missed a step getting back to bed and has a goose egg to the back of my head from hitting the night stand. \" Pt denies LOC. She denies n/v, denies visual changes. Pt denies pain to neck/back/arms/legs.  Pt is on Eliquis

## 2023-11-20 LAB — LEVETIRACETAM LVL: 88.4 UG/ML

## 2023-11-20 RX ORDER — LEVETIRACETAM 500 MG/1
1000 TABLET ORAL 2 TIMES DAILY
Qty: 360 TABLET | Refills: 0 | Status: SHIPPED | OUTPATIENT
Start: 2023-11-20

## 2023-12-05 DIAGNOSIS — G40.909 SEIZURE DISORDER (HCC): ICD-10-CM

## 2023-12-05 NOTE — TELEPHONE ENCOUNTER
Refill to soon 90 days was sent on 11/20/23      Medication: LEVETIRACETAM 750 MG Oral Tab     Date of last refill: 11/20/23 (360/0)  [place] 57/25/86    Last office visit: 02/09/23  Due back to clinic per last office note:  6 months  Date next office visit scheduled:    Future Appointments   Date Time Provider David Sharp   12/6/2023  3:20 PM DO LACHO Wise [address] note recommendation:     Plan:  1. R PCA Stroke  - Continue Eliquis 5mg BID  - Continue Atorvastatin 40mg QHS  - Follow up with PCP and cardiology     Stroke Prevention  - Limit EtOH to no more than 2 drinks per day for men and 1 for women  - Follow  A mediterranean diet  - Abstain from tobacco products  - BP goals <140/90 optimally normotensive 120/80. Use ACEI if possible  - LDL goal < 70     2.  Seizures  - Continue Keppra 1500mg BID        - Will give 750 tabs as they are easier for her to swallow  - Continue Vimpat 100mg BID

## 2023-12-06 ENCOUNTER — OFFICE VISIT (OUTPATIENT)
Dept: NEUROLOGY | Facility: CLINIC | Age: 58
End: 2023-12-06
Payer: COMMERCIAL

## 2023-12-06 VITALS
DIASTOLIC BLOOD PRESSURE: 60 MMHG | BODY MASS INDEX: 21 KG/M2 | WEIGHT: 116 LBS | SYSTOLIC BLOOD PRESSURE: 99 MMHG | RESPIRATION RATE: 17 BRPM | HEART RATE: 73 BPM

## 2023-12-06 DIAGNOSIS — G40.909 SEIZURE DISORDER (HCC): Primary | ICD-10-CM

## 2023-12-06 DIAGNOSIS — I63.50 CEREBRAL ARTERY OCCLUSION WITH CEREBRAL INFARCTION (HCC): ICD-10-CM

## 2023-12-06 RX ORDER — LEVETIRACETAM 750 MG/1
1500 TABLET ORAL 2 TIMES DAILY
Qty: 120 TABLET | Refills: 0 | OUTPATIENT
Start: 2023-12-06

## 2023-12-06 NOTE — PROGRESS NOTES
Neurology H&P    Yrn Anderson Patient Status:  No patient class for patient encounter    1965 MRN [id number]   Location [address] [facility], [address], [address] [facility] Attending No att. providers found   Hosp Day # 0 PCP Samara Conrad DO     Subjective:  Yrn Anderson is a(n) 63year old female with a PMH significant for cardiomyopathy, EtOH abuse, myocardial infarction, and  R PCA stroke in setting of coumadin non compliance, and seizures due to EtOH and previous stroke. She was last seen in clinic on 2023. She has not had any seizures since her last clinic visit. She continues to take Keppra 100mg BID, and Vimpat 100mg BID for seizure prophylaxis. Her last Keppra level was supra therapeutic at 88. She states that she is feeling less sleepy. Current Medications:  Current Outpatient Medications   Medication Sig Dispense Refill    levetiracetam 500 MG Oral Tab Take 2 tablets (1,000 mg total) by mouth 2 (two) times daily. 360 tablet 0    lacosamide 100 MG Oral Tab Take 1 tablet (100 mg total) by mouth 2 (two) times daily. 180 tablet 0    atorvastatin 10 MG Oral Tab Take 1 tablet (10 mg total) by mouth nightly. 90 tablet 3    SERTRALINE 100 MG Oral Tab TAKE ONE TABLET BY MOUTH DAILY 90 tablet 0    FOLIC ACID 1 MG Oral Tab TAKE ONE TABLET BY MOUTH DAILY 90 tablet 0    metoprolol succinate 25 MG Oral Tablet 24 Hr Take 1 tablet (25 mg total) by mouth every evening. 90 tablet 3    apixaban (ELIQUIS) 5 MG Oral Tab Take 1 tablet (5 mg total) by mouth 2 (two) times daily. 180 tablet 3    Ferrous Sulfate 325 (65 Fe) MG Oral Tab Take 1 tablet (325 mg total) by mouth daily with breakfast.      lisinopril 2.5 MG Oral Tab Take 1 tablet (2.5 mg total) by mouth every evening. 90 tablet 3    multivitamin Oral Tab Take 1 tablet by mouth daily. thiamine 100 MG Oral Tab Take 1 tablet (100 mg total) by mouth daily.  (Patient not taking: Reported on 2023) 30 tablet 0       Problem List:  Patient Active Problem List   Diagnosis    History of MI (myocardial infarction)    Cardiomyopathy ([facility] [address]    Abdominal pain    Alcoholism ([facility] [address]    Chest pain    Seizure due to alcohol withdrawal, uncomplicated ([address]    Cerebral artery occlusion with cerebral infarction ([address]    Thrombocytopenia ([address]    Intractable abdominal pain    Acute left-sided weakness    Hypokalemia    Chronic systolic heart failure ([address]    Dyslipidemia    Arterial hypotension    Hypoalbuminemia due to protein-calorie malnutrition (HCC)    Acute chest pain    Alcoholic cardiomyopathy ([address]    Coronary artery disease involving native coronary artery of native heart without angina pectoris    Hyperlipidemia    Hospital discharge follow-up    Unsteady gait    Hyperglycemia    Status epilepticus ([facility] [address]    Acute kidney injury ([facility] [address]    Metabolic acidosis    Metabolic alkalosis    Encephalopathy, ALCOHOLIC    Encephalopathy, HEPATIC    Left arm weakness    Memory loss    Seizure disorder (HCC)    Symptomatic localization-related epilepsy ([address]    ETOH abuse    Alcohol withdrawal delirium, acute, hypoactive (HCC)    Hypomagnesemia    Chronic ischemic right PCA stroke    Teddy's paralysis ([facility] [address]    Malignant melanoma ([facility] [address]    Balance problem    Primary hypertension    Hypothyroidism    Epilepsia partialis continua (HCC)    Numbness and tingling in left hand    Lumbar radiculopathy       PMHx:  Past Medical History:   Diagnosis Date    Cardiomyopathy ([facility] [address] 11/13    ?etoh    CORONARY ARTERY DISEASE     Depression     Heart attack ([address] 05/21/2012    High blood pressure     History of blood clots     legs; heart; head    History of ETOH abuse     Pneumonia, organism unspecified(486)     pt denies having this 10/29/19    Psychiatric disorder     Seizure disorder ([facility] [address]     Stroke ([facility] [address] 04/2015    Unspecified essential hypertension        PSHx:  Past Surgical History:   Procedure Laterality Date    ANGIOGRAM  11/13/13    minimal CAD in the RCA ANGIOPLASTY (CORONARY)  12    LAD-thrombotic event          x1    IR ANGIOGRAM CEREBRAL CAROTID BILATERAL  2018         SKIN SURGERY  2020    melanoma removal       SocHx:  Social History     Socioeconomic History    Marital status:    Tobacco Use    Smoking status: Former     Packs/day: 0.75     Years: 10.00     Additional pack years: 0.00     Total pack years: 7.50     Types: Cigarettes     Quit date: 11/3/1996     Years since quittin.1    Smokeless tobacco: Never   Vaping Use    Vaping Use: Never used   Substance and Sexual Activity    Alcohol use: Yes     Alcohol/week: 3.0 standard drinks of alcohol     Types: 3 Cans of beer per week     Comment: 3 cans beer/day    Drug use: No    Sexual activity: Yes     Partners: Male   Other Topics Concern    Caffeine Concern No    Exercise Yes     Comment: walk on treadmill 3 times per week       Family History:  Family History   Problem Relation Age of Onset    Hypertension Father     Neurological Disorder Father         Parkinsons    Neurological Disorder Mother         alzhemiers and Parkinsons    Cancer Neg        ROS:  10 point ROS completed and pertinent positives in HPI    Objective/Physical Exam:    Vital Signs:  Blood pressure 99/60, pulse 73, resp. rate 17, weight 116 lb (52.6 kg).     Gen: Awake and in no apparent distress  HEENT: moist mucus membranes  Neck: Supple  Cardiovascular: Regular rate and rhythm, no murmur  Pulm: CTAB  GI: non-tender, normal bowel sounds  Skin: normal, dry  Extremities: No clubbing or cyanosis      Neurologic:   MS: awake and alert and oriented x 3, speech is fluent and conversant    CN: PERRL, EOMI, L homonomous hemianopsia, Facial sensation is intact in V1-V3 bilaterally, the face is symmetric with strong eyelid approximation, tongue is midline, shrug is intact     MSK:        RUE: Delt: 5/5, Bi: 5/5, Tri: 5/5, : /5       LUE: Delt: /5, Bi: /5, Tri: 5/5, :        RLE: HF: 4-/5, HE: , KF: 5/5, KE: 5/5        LLE: HF: 4/5, HE: 5/5, KF: 5/5, KE: 5/5        Normal tone       Normal muscle bulk    Sens:        UE: Intact to light touch and pinprick throughout       LE: Inact to light touch, pinprick intact    Reflexes:        UE: 2+ biceps, 2+ brachioradialis       LE: 2+ patellae    Gait: normal gait    Coordination:       FTN: with BUE intention tremor and mild positional tremor            Labs:       Imaging:  [facility] 7/16/18      CONCLUSION:       1. No acute intracranial hemorrhage or evidence of acute territorial infarction. 2.  Stable encephalomalacia from old infarct in the right parietal lobe. 3. There are increased mild to moderate age-indeterminate microvascular ischemic changes in the cerebral white matter. Consider MRI for further evaluation. [facility] 7/16/18  CONCLUSION:       1. Acute occlusion of the right posterior cerebral artery beginning within the P2 segment and extending distally. 2. No evidence of occlusion, dissection, or flow-limiting stenosis in the cervical vertebral or carotid arteries. No evidence of hemodynamically significant carotid stenosis by NASCET criteria. 3.  Thyroid nodules would be better assessed with dedicated thyroid ultrasound. Interventional Radiology   FINDINGS:    Right vertebral angiography:  The distal cervical and intracranial segments of the dominant right vertebral artery are normal.  There is a small right [place], with dominant anterior inferior cerebellar arteries bilaterally. The left V4 segment is seen   via reflux and is relatively hypoplastic. The basilar artery and superior cerebellar arteries are normal.  The left PCA is normal.  A moderate left posterior communicating artery is seen. The right P1 segment only supplies perforators, compatible with   a fetal origin of the right PCA.   The capillary and venous phases are normal.     Right internal carotid angiography:  The distal cervical and intracranial segments of the artery are normal.  The anterior and middle cerebral arteries and branches are normal.  An azygos A2 segment is seen which is a variant of normal.  There is a   reflux into the left a 1 segment, left supra clinoid ICA and left MCA which are angiographically unremarkable. The right PCA is fetal.  What was demonstrated to be a more proximal occlusion on the CT angiogram now demonstrates a more distal occlusion at   the distal P3 segment beyond some of the temporal branches of the AA PCA. On the late arterial and capillary phases, there is evidence for lepto-meningeal back filling of distal PCA branches. There is some early AV shunting through the medial temporal   lobe, likely related to reperfusion from partial dissolution and distal migration of the previously more proximally occlusive embolus. The capillary and venous phases are otherwise unremarkable with a dominant left transverse and sigmoid system.     =====  CONCLUSION:    1. There has been interval partial dissolution and presumed distal migration of the embolus occluding the proximal fetal right PCA. The new area of occlusion is more distal at the distal P3 segment with good distal leptomeningeal collaterals. As a   consequence of this finding, which at this point was no longer compatible with a large vessel occlusion, a decision was made not to proceed with endovascular treatment. 2.  Luxury perfusion and consequent mild AV shunting is seen in the medial right temporal lobe as a consequence of previous ischemia and subsequent autolysis/migration of the previously occlusive embolus. MRI Brain 7/18/18  =====  CONCLUSION:       1. Redemonstration of evolving infarction throughout the right PCA territory including the mid posterior right temporal lobe, right occipital lobe, posterior limb of the right internal capsule, and right thalamus. This does not appear significantly   changed in extent when compared to the CT from 7/17/2018.   There are focal areas of gradient susceptibility scattered in the medial right temporal lobe and right occipital lobe that likely represent areas of petechial hemorrhage. This can be followed   with CT. 2. There is a punctate area of restricted diffusion in the left parietal lobe best seen on image 21 series 3 compatible with an area of acute ischemia/infarction. 3. Punctate foci of gradient susceptibility noted in the posterior lateral right temporal lobe, mid left temporal lobe, and left frontal lobe likely represent areas of chronic microhemorrhage and/or focal mineralization. 4. There is encephalomalacia in the right parietal lobe from old infarct that also demonstrates minimal changes of laminar necrosis. 5. Mild to moderate chronic microvascular ischemic changes in the cerebral white matter. [facility] 8/26/19  =====  CONCLUSION:    1. No acute hemorrhage. 2. Similar-appearing old right PCA/right MCA territorial infarcts    Assessment: This is a 66 y/o female with a h/o R PCA stroke in setting of coumadin non-compliance, and seizures (both from EtOH withdrawal, medication non-compliance and previous stroke). She should continue Keppra and Vimpat. Her last Keppra level was elevated to 88 and we did reduce the dose to 1000mg BID. I will get another level. I am cautious about reducing this any further as she has had several seizure in the past on this same dose and is not have any side effects on this current dose. Plan:  1. R PCA Stroke  - Continue Eliquis 5mg BID  - Continue Atorvastatin 40mg QHS  - Follow up with PCP and cardiology    Stroke Prevention  - Limit EtOH to no more than 2 drinks per day for men and 1 for women  - Follow  A mediterranean diet  - Abstain from tobacco products  - BP goals <140/90 optimally normotensive 120/80. Use ACEI if possible  - LDL goal < 70    2.  Seizures  - Continue Keppra 1000mg BID  - Continue Vimpat 100mg BID        Stevenson Petty, DO  Neurology

## 2024-02-12 DIAGNOSIS — G40.909 SEIZURE DISORDER (HCC): Primary | ICD-10-CM

## 2024-02-12 DIAGNOSIS — G40.909 SEIZURE DISORDER (HCC): ICD-10-CM

## 2024-02-12 RX ORDER — LEVETIRACETAM 500 MG/1
1000 TABLET ORAL 2 TIMES DAILY
Qty: 360 TABLET | Refills: 0 | Status: SHIPPED | OUTPATIENT
Start: 2024-02-12

## 2024-02-12 RX ORDER — LACOSAMIDE 100 MG/1
100 TABLET ORAL 2 TIMES DAILY
Qty: 180 TABLET | Refills: 0 | Status: SHIPPED | OUTPATIENT
Start: 2024-02-12

## 2024-02-12 NOTE — TELEPHONE ENCOUNTER
Medication: lacosamide 100 MG Oral Tab      Date of last refill: 11/13/2023 (#180/0)  [place]     Last office visit: 12/06/2023  Due back to clinic per last office note:  Around 06/06/2024  Date next office visit scheduled:    No future appointments.        Last OV note recommendation:    Assessment:  This is a 56 y/o female with a h/o R PCA stroke in setting of coumadin non-compliance, and seizures (both from EtOH withdrawal, medication non-compliance and previous stroke). She should continue Keppra and Vimpat. Her last Keppra level was elevated to 88 and we did reduce the dose to 1000mg BID. I will get another level. I am cautious about reducing this any further as she has had several seizure in the past on this same dose and is not have any side effects on this current dose.      Plan:  1. R PCA Stroke  - Continue Eliquis 5mg BID  - Continue Atorvastatin 40mg QHS  - Follow up with PCP and cardiology     Stroke Prevention  - Limit EtOH to no more than 2 drinks per day for men and 1 for women  - Follow  A mediterranean diet  - Abstain from tobacco products  - BP goals <140/90 optimally normotensive 120/80. Use ACEI if possible  - LDL goal < 70     2. Seizures  - Continue Keppra 1000mg BID  - Continue Vimpat 100mg BID        Pawan Blanchard, DO  Neurology

## 2024-02-12 NOTE — TELEPHONE ENCOUNTER
Medication: LEVETIRACETAM 500 MG Oral Tab      Date of last refill: 11/20/2023 (#360/0)  [place]     Last office visit: 12/06/2023  Due back to clinic per last office note:  Around 06/06/2024  Date next office visit scheduled:    No future appointments.        Last OV note recommendation:    Assessment:  This is a 56 y/o female with a h/o R PCA stroke in setting of coumadin non-compliance, and seizures (both from EtOH withdrawal, medication non-compliance and previous stroke). She should continue Keppra and Vimpat. Her last Keppra level was elevated to 88 and we did reduce the dose to 1000mg BID. I will get another level. I am cautious about reducing this any further as she has had several seizure in the past on this same dose and is not have any side effects on this current dose.      Plan:  1. R PCA Stroke  - Continue Eliquis 5mg BID  - Continue Atorvastatin 40mg QHS  - Follow up with PCP and cardiology     Stroke Prevention  - Limit EtOH to no more than 2 drinks per day for men and 1 for women  - Follow  A mediterranean diet  - Abstain from tobacco products  - BP goals <140/90 optimally normotensive 120/80. Use ACEI if possible  - LDL goal < 70     2. Seizures  - Continue Keppra 1000mg BID  - Continue Vimpat 100mg BID        Pawan Blanchard, DO  Neurology

## 2024-03-17 DIAGNOSIS — G40.909 SEIZURE DISORDER (HCC): ICD-10-CM

## 2024-03-17 RX ORDER — LACOSAMIDE 100 MG/1
100 TABLET ORAL 2 TIMES DAILY
Qty: 180 TABLET | Refills: 0 | Status: CANCELLED | OUTPATIENT
Start: 2024-03-17

## 2024-03-18 NOTE — TELEPHONE ENCOUNTER
Too early to fill. Patient cinthya has refills at the pharmacy.     Medication: Lacosamide 100 mg     Date of last refill: 02/12/2024 (#180/0)  [place]12/2024     Last office visit: 12/06/2023  Due back to clinic per last office note:  6 months  Date next office visit scheduled:    No future appointments.        Last OV note recommendation:    Plan:  1. R PCA Stroke  - Continue Eliquis 5mg BID  - Continue Atorvastatin 40mg QHS  - Follow up with PCP and cardiology     Stroke Prevention  - Limit EtOH to no more than 2 drinks per day for men and 1 for women  - Follow  A mediterranean diet  - Abstain from tobacco products  - BP goals <140/90 optimally normotensive 120/80. Use ACEI if possible  - LDL goal < 70     2. Seizures  - Continue Keppra 1000mg BID  - Continue Vimpat 100mg BID

## 2024-04-01 ENCOUNTER — APPOINTMENT (OUTPATIENT)
Dept: CT IMAGING | Facility: HOSPITAL | Age: 59
End: 2024-04-01
Attending: EMERGENCY MEDICINE
Payer: COMMERCIAL

## 2024-04-01 ENCOUNTER — HOSPITAL ENCOUNTER (EMERGENCY)
Facility: HOSPITAL | Age: 59
Discharge: HOME | End: 2024-04-02
Attending: EMERGENCY MEDICINE
Payer: COMMERCIAL

## 2024-04-01 VITALS
BODY MASS INDEX: 22.08 KG/M2 | WEIGHT: 120 LBS | HEIGHT: 62 IN | HEART RATE: 69 BPM | RESPIRATION RATE: 18 BRPM | DIASTOLIC BLOOD PRESSURE: 70 MMHG | TEMPERATURE: 98 F | OXYGEN SATURATION: 97 % | SYSTOLIC BLOOD PRESSURE: 123 MMHG

## 2024-04-01 DIAGNOSIS — S01.01XA LACERATION OF SCALP, INITIAL ENCOUNTER: Primary | ICD-10-CM

## 2024-04-01 DIAGNOSIS — S09.90XA ACUTE HEAD INJURY, INITIAL ENCOUNTER: ICD-10-CM

## 2024-04-01 PROCEDURE — 99284 EMERGENCY DEPT VISIT MOD MDM: CPT

## 2024-04-01 PROCEDURE — 12002 RPR S/N/AX/GEN/TRNK2.6-7.5CM: CPT

## 2024-04-01 PROCEDURE — 70450 CT HEAD/BRAIN W/O DYE: CPT | Performed by: EMERGENCY MEDICINE

## 2024-04-02 RX ORDER — ACETAMINOPHEN 500 MG
1000 TABLET ORAL ONCE
Status: COMPLETED | OUTPATIENT
Start: 2024-04-02 | End: 2024-04-02

## 2024-04-02 NOTE — ED INITIAL ASSESSMENT (HPI)
Pt states she was walking outside when she missed a curb and fell backwards. Pt denies LOC, (+) lac to back of head. States she takes Eliquis. Pt denies pain to neck and back, she denies n/v, deniess dizziness

## 2024-04-02 NOTE — ED PROVIDER NOTES
Patient Seen in: [facility] Emergency Department      History     Chief Complaint   Patient presents with    Fall     Stated Complaint: fell, hit head onto concrete, tripping over curb, denies LOC, reports blood thi*    Subjective:   HPI    58-year-old female present emerged part for head injury.  Patient hit the back of her head against concrete when she tripped over the curb.  There is no loss of consciousness she denies any other exacerbating factors or associated symptoms.    Objective:   Past Medical History:   Diagnosis Date    Cardiomyopathy ([facility])     ?etoh    CORONARY ARTERY DISEASE     Depression     Heart attack (H[facility]) 2012    High blood pressure     History of blood clots     legs; heart; head    History of ETOH abuse     Pneumonia, organism unspecified(486)     pt denies having this 10/29/19    Psychiatric disorder     Seizure disorder ([facility])     Stroke ([facility]) 2015    Unspecified essential hypertension               Past Surgical History:   Procedure Laterality Date    ANGIOGRAM  13    minimal CAD in the RCA    ANGIOPLASTY (CORONARY)  12    LAD-thrombotic event          x1    IR ANGIOGRAM CEREBRAL CAROTID BILATERAL  2018         SKIN SURGERY  2020    melanoma removal                Social History     Socioeconomic History    Marital status:    Tobacco Use    Smoking status: Former     Packs/day: 0.75     Years: 10.00     Additional pack years: 0.00     Total pack years: 7.50     Types: Cigarettes     Quit date: 11/3/1996     Years since quittin.4    Smokeless tobacco: Never   Vaping Use    Vaping Use: Never used   Substance and Sexual Activity    Alcohol use: Yes     Alcohol/week: 3.0 standard drinks of alcohol     Types: 3 Cans of beer per week     Comment: 3-4 cans simply seltzer daily    Drug use: No    Sexual activity: Yes     Partners: Male   Other Topics Concern    Caffeine Concern No    Exercise Yes     Comment: walk on treadmill 3 times per  week              Review of Systems    Positive for stated complaint: fell, hit head onto concrete, tripping over curb, denies LOC, reports blood thi*  Other systems are as noted in HPI.  Constitutional and vital signs reviewed.      All other systems reviewed and negative except as noted above.    Physical Exam     ED Triage Vitals [04/01/24 2236]   BP (!) 135/96   Pulse 73   Resp 20   Temp 97.6 °F (36.4 °C)   Temp src Temporal   SpO2 100 %   O2 Device None (Room air)       Current:/70   Pulse 69   Temp 97.6 °F (36.4 °C) (Temporal)   Resp 18   Ht 157.5 cm (5' 2\")   Wt 54.4 kg   SpO2 97%   BMI 21.95 kg/m²         Physical Exam    Awake alert patient appears no distress patient noted to have a contusion abrasion to the occiput otherwise HEENT exam is normal lungs are clear cardiovascular exam shows regular rhythm abdomen soft nontender extremities no COVID cyanosis or edema no rash back exam is normal skin is nondiaphoretic full range of motion extremities no focal neurologic deficits    ED Course   Labs Reviewed - No data to display          Differential diagnoses include subarachnoid hemorrhage subdural hematoma         MDM                                         Medical Decision Making  58-year-old female presenting emerged part for head injury.  Independent interpretation by ED physician of head CT shows no subarachnoid hemorrhage subdural hematoma.  Laceration was cleaned thoroughly it is a 3 cm laceration on the occiput.  Patient has 10 is 9 staples placed approximated wound edges adequately hemostasis achieved patient will be discharged home to have staples removed in 7 to 10 days and is to return emerged part worsening symptoms other complaints the patient was screened and evaluated during this visit.  As a treating physician attending to the patient, I determined, within reasonable clinical confidence and prior to discharge, that an emergency medical condition was not or was no longer present.   There was no indication for further evaluation, treatment or admission on an emergency basis.    The usual and customary discharge instructions were discussed given the patient's ER course.  We discussed signs and symptoms that should prompt the patient's immediate return to the emergency department.  Reasonable over-the-counter and prescription treatment options and physician follow-up plan was discussed.  Patient was discharged home in good condition    This note was prepared using Dragon Medical voice recognition dictation software.  As a result errors may occur.  When identified to these areas have been corrected.  While every attempt is made to correct errors during dictation discrepancies may still exist.  Please contact if there are any errors    Problems Addressed:  Acute head injury, initial encounter: acute illness or injury  Laceration of scalp, initial encounter: acute illness or injury    Amount and/or Complexity of Data Reviewed  Radiology: ordered and independent interpretation performed. Decision-making details documented in ED Course.  ECG/medicine tests: ordered and independent interpretation performed. Decision-making details documented in ED Course.        Disposition and Plan     Clinical Impression:  1. Laceration of scalp, initial encounter    2. Acute head injury, initial encounter         Disposition:  Discharge  4/1/2024 11:54 pm    Follow-up:  No follow-up provider specified.        Medications Prescribed:  Current Discharge Medication List

## 2024-04-13 ENCOUNTER — HOSPITAL ENCOUNTER (EMERGENCY)
Facility: HOSPITAL | Age: 59
Discharge: HOME | End: 2024-04-13
Attending: EMERGENCY MEDICINE
Payer: COMMERCIAL

## 2024-04-13 VITALS
SYSTOLIC BLOOD PRESSURE: 122 MMHG | DIASTOLIC BLOOD PRESSURE: 77 MMHG | TEMPERATURE: 98 F | OXYGEN SATURATION: 95 % | RESPIRATION RATE: 18 BRPM | HEART RATE: 84 BPM

## 2024-04-13 DIAGNOSIS — Z48.02 ENCOUNTER FOR STAPLE REMOVAL: Primary | ICD-10-CM

## 2024-04-13 NOTE — ED PROVIDER NOTES
Patient Seen in: [facility] Emergency Department      History     Chief Complaint   Patient presents with    Staple Removal     Stated Complaint: Needs staples removed.    Subjective:   58-year-old female presents about 2 weeks after mechanical fall and scalp laceration.  Had 9 staples placed via staple gun.  Here for staple removal.  No other complaints.  No complications.  No fevers.  No drainage.            Objective:   Past Medical History:    Cardiomyopathy (HCC)    ?etoh    CORONARY ARTERY DISEASE    Depression    Heart attack (HCC)    High blood pressure    History of blood clots    legs; heart; head    History of ETOH abuse    Pneumonia, organism unspecified(486)    pt denies having this 10/29/19    Psychiatric disorder    Seizure disorder (HCC)    Stroke (HCC)    Unspecified essential hypertension              Past Surgical History:   Procedure Laterality Date    Angiogram  13    minimal CAD in the RCA    Angioplasty (coronary)  12    LAD-thrombotic event          x1    Ir angiogram cerebral carotid bilateral  2018         Skin surgery  2020    melanoma removal                Social History     Socioeconomic History    Marital status:    Tobacco Use    Smoking status: Former     Current packs/day: 0.00     Average packs/day: 0.8 packs/day for 10.0 years (7.5 ttl pk-yrs)     Types: Cigarettes     Start date: 11/3/1986     Quit date: 11/3/1996     Years since quittin.4    Smokeless tobacco: Never   Vaping Use    Vaping status: Never Used   Substance and Sexual Activity    Alcohol use: Yes     Comment: drinks seltzers 2-3x day    Drug use: No    Sexual activity: Yes     Partners: Male   Other Topics Concern    Caffeine Concern No    Exercise Yes     Comment: walk on treadmill 3 times per week              Review of Systems   Constitutional:  Negative for fever.   Neurological:  Negative for dizziness and headaches.   All other systems reviewed and are  negative.      Positive for stated complaint: Needs staples removed.  Other systems are as noted in HPI.  Constitutional and vital signs reviewed.      All other systems reviewed and negative except as noted above.    Physical Exam     ED Triage Vitals [04/13/24 1648]   /77   Pulse 84   Resp 18   Temp 98 °F (36.7 °C)   Temp src Temporal   SpO2 95 %   O2 Device None (Room air)       Current:/77   Pulse 84   Temp 98 °F (36.7 °C) (Temporal)   Resp 18   SpO2 95%         Physical Exam  Vitals and nursing note reviewed.   Constitutional:       Appearance: She is not toxic-appearing.   HENT:      Head: Normocephalic.      Comments: 9 staples in the right parietal scalp near the midline.  Wound is well-appearing.  No dehiscence.  No cellulitis.  No purulence     Mouth/Throat:      Mouth: Mucous membranes are moist.   Eyes:      Extraocular Movements: Extraocular movements intact.   Cardiovascular:      Rate and Rhythm: Normal rate.   Pulmonary:      Effort: No respiratory distress.   Musculoskeletal:         General: Normal range of motion.   Skin:     General: Skin is warm and dry.   Neurological:      Mental Status: She is alert.      Gait: Gait normal.   Psychiatric:         Mood and Affect: Mood normal.         Behavior: Behavior normal.               ED Course   Labs Reviewed - No data to display                   MDM      With informed consent obtained I did remove 9 staples MVL staple remover.  No complications.  Wound is overall well-appearing with some dried blood and scabbing over it.  No dehiscence.  No signs of infection.  Patient tolerated well.  No complications.    Family at bedside helpful to provide information history presenting illness    Differential diagnosis includes but is not limited to, wound infection, wound dehiscence, staple removal    External chart review demonstrates her initial visit 12 days ago and the staples were placed.  She also has some telephone encounters with PCP in  February and a neurology visit in December.    58-year-old female here for encounter for staple removal.  9 staples removed at bedside.  States she had 9 placed initially.  I do not see any other staples.  The wound is well-appearing overall.  She has no other complaints.  Discharge home, return precaution provided, shared decision making utilized    Patient was screened and evaluated during this visit.  As the treating physician attending to the patient, I determined within reasonable clinical confidence and prior to discharge, that an emergency medical condition was not or was no longer present.  There was no indication for further evaluation, treatment, or admission on an emergency basis.  Comprehensive verbal and written discharge and follow-up instructions were provided to help prevent relapse or worsening.  Patient was instructed to follow-up with their primary care provider for further evaluation and treatment, return immediately to ER for worsening, concerning, new, or changing/persisting symptoms. I discussed the case with the patient and they had no questions, complaints, or concerns.  Patient was comfortable going home.     Per the discharge paperwork, patients are encouraged to and given instructions on how to sign up for [facility], where they have access to their records, including any/all incidental findings.     This note was prepared using Dragon Medical voice recognition dictation software. As a result errors may occur. When identified these errors have been corrected. While every attempt is made to correct errors during dictation discrepancies may still exist    Note to patient: The 21st Century Cures Act makes medical notes like these available to patients in the interest of transparency. However, this is a medical document intended as peer to peer communication. It is written in medical language and may contain abbreviations or verbiage that are unfamiliar. It may appear blunt or direct. Medical  documents are intended to carry relevant information, facts as evident, and the clinical opinion of the practitioner.                                Medical Decision Making      Disposition and Plan     Clinical Impression:  1. Encounter for staple removal         Disposition:  Discharge  4/13/2024  5:20 pm    Follow-up:  Deann Hallman DO  [address] [place] [address]  [phone]    Follow up            Medications Prescribed:  Current Discharge Medication List

## 2024-04-19 DIAGNOSIS — G40.909 SEIZURE DISORDER (HCC): ICD-10-CM

## 2024-04-19 RX ORDER — LACOSAMIDE 100 MG/1
100 TABLET ORAL 2 TIMES DAILY
Qty: 180 TABLET | Refills: 0 | Status: SHIPPED | OUTPATIENT
Start: 2024-04-19

## 2024-05-13 DIAGNOSIS — G40.909 SEIZURE DISORDER (HCC): ICD-10-CM

## 2024-05-14 RX ORDER — LEVETIRACETAM 500 MG/1
1000 TABLET ORAL 2 TIMES DAILY
Qty: 360 TABLET | Refills: 0 | Status: SHIPPED | OUTPATIENT
Start: 2024-05-14

## 2024-05-14 NOTE — TELEPHONE ENCOUNTER
Medication: LEVETIRACETAM 500 MG Oral Tab      Date of last refill: 02/12/2024 (#360/0)  [place]     Last office visit: 12/06/2023  Due back to clinic per last office note:  in 6 months   Date next office visit scheduled:    No future appointments.        Last OV note recommendation:    Assessment:  This is a 56 y/o female with a h/o R PCA stroke in setting of coumadin non-compliance, and seizures (both from EtOH withdrawal, medication non-compliance and previous stroke). She should continue Keppra and Vimpat. Her last Keppra level was elevated to 88 and we did reduce the dose to 1000mg BID. I will get another level. I am cautious about reducing this any further as she has had several seizure in the past on this same dose and is not have any side effects on this current dose.      Plan:  1. R PCA Stroke  - Continue Eliquis 5mg BID  - Continue Atorvastatin 40mg QHS  - Follow up with PCP and cardiology     Stroke Prevention  - Limit EtOH to no more than 2 drinks per day for men and 1 for women  - Follow  A mediterranean diet  - Abstain from tobacco products  - BP goals <140/90 optimally normotensive 120/80. Use ACEI if possible  - LDL goal < 70     2. Seizures  - Continue Keppra 1000mg BID  - Continue Vimpat 100mg BID        Pawan Blanchard, DO  Neurology

## 2024-07-14 DIAGNOSIS — G40.909 SEIZURE DISORDER (HCC): ICD-10-CM

## 2024-07-16 RX ORDER — LACOSAMIDE 100 MG/1
100 TABLET ORAL 2 TIMES DAILY
Qty: 180 TABLET | Refills: 0 | Status: SHIPPED | OUTPATIENT
Start: 2024-07-16

## 2024-07-16 NOTE — TELEPHONE ENCOUNTER
Medication: lacosamide 100 MG Oral Tab      Date of last refill: 04/19/2024 (#180/0)  [place]     Last office visit: 12/06/2023  Due back to clinic per last office note:  Around 06/06/2024  Date next office visit scheduled:    Future Appointments   Date Time Provider Department Center   9/6/2024 10:40 AM Pawan Blanchard DO [facility] [id number]           Last OV note recommendation:    Assessment:  This is a 58 y/o female with a h/o R PCA stroke in setting of coumadin non-compliance, and seizures (both from EtOH withdrawal, medication non-compliance and previous stroke). She should continue Keppra and Vimpat. Her last Keppra level was elevated to 88 and we did reduce the dose to 1000mg BID. I will get another level. I am cautious about reducing this any further as she has had several seizure in the past on this same dose and is not have any side effects on this current dose.      Plan:  1. R PCA Stroke  - Continue Eliquis 5mg BID  - Continue Atorvastatin 40mg QHS  - Follow up with PCP and cardiology     Stroke Prevention  - Limit EtOH to no more than 2 drinks per day for men and 1 for women  - Follow  A mediterranean diet  - Abstain from tobacco products  - BP goals <140/90 optimally normotensive 120/80. Use ACEI if possible  - LDL goal < 70     2. Seizures  - Continue Keppra 1000mg BID  - Continue Vimpat 100mg BID        Pawan Blanchard DO  Neurology

## 2024-08-10 DIAGNOSIS — G40.909 SEIZURE DISORDER (HCC): Primary | ICD-10-CM

## 2024-08-12 NOTE — TELEPHONE ENCOUNTER
Medication: LEVETIRACETAM 500 MG Oral Tab      Date of last refill: 5/14/24 (#360/0)  [place]     Last office visit: 12/6/23  Due back to clinic per last office note:  around 6/6/2024   Date next office visit scheduled:    Future Appointments   Date Time Provider Department Center   9/6/2024 10:40 AM Pawan Blanchard DO [facility] [id number]           Last OV note recommendation:    Plan:  1. R PCA Stroke  - Continue Eliquis 5mg BID  - Continue Atorvastatin 40mg QHS  - Follow up with PCP and cardiology     Stroke Prevention  - Limit EtOH to no more than 2 drinks per day for men and 1 for women  - Follow  A mediterranean diet  - Abstain from tobacco products  - BP goals <140/90 optimally normotensive 120/80. Use ACEI if possible  - LDL goal < 70     2. Seizures  - Continue Keppra 1000mg BID  - Continue Vimpat 100mg BI

## 2024-08-13 RX ORDER — LEVETIRACETAM 500 MG/1
1000 TABLET ORAL 2 TIMES DAILY
Qty: 360 TABLET | Refills: 0 | Status: SHIPPED | OUTPATIENT
Start: 2024-08-13

## 2024-10-14 DIAGNOSIS — G40.909 SEIZURE DISORDER (HCC): ICD-10-CM

## 2024-10-14 NOTE — TELEPHONE ENCOUNTER
Medication: lacosamide 100 MG Oral Tab      Date of last refill: 07/16/2024 (#180/0)  [place]     Last office visit: 12/06/2023  Due back to clinic per last office note:  Around 06/06/2024  Date next office visit scheduled:    Future Appointments   Date Time Provider Department Center   12/4/2024  1:40 PM Pawan Blanchard DO [facility] [id number]           Last OV note recommendation:       Assessment:  This is a 56 y/o female with a h/o R PCA stroke in setting of coumadin non-compliance, and seizures (both from EtOH withdrawal, medication non-compliance and previous stroke). She should continue Keppra and Vimpat. Her last Keppra level was elevated to 88 and we did reduce the dose to 1000mg BID. I will get another level. I am cautious about reducing this any further as she has had several seizure in the past on this same dose and is not have any side effects on this current dose.      Plan:  1. R PCA Stroke  - Continue Eliquis 5mg BID  - Continue Atorvastatin 40mg QHS  - Follow up with PCP and cardiology     Stroke Prevention  - Limit EtOH to no more than 2 drinks per day for men and 1 for women  - Follow  A mediterranean diet  - Abstain from tobacco products  - BP goals <140/90 optimally normotensive 120/80. Use ACEI if possible  - LDL goal < 70     2. Seizures  - Continue Keppra 1000mg BID  - Continue Vimpat 100mg BID        Pawan Blanchard DO  Neurology

## 2024-10-15 RX ORDER — LACOSAMIDE 100 MG/1
100 TABLET ORAL 2 TIMES DAILY
Qty: 180 TABLET | Refills: 0 | Status: SHIPPED | OUTPATIENT
Start: 2024-10-15

## 2024-11-12 DIAGNOSIS — G40.909 SEIZURE DISORDER (HCC): ICD-10-CM

## 2024-11-12 RX ORDER — LEVETIRACETAM 500 MG/1
1000 TABLET ORAL 2 TIMES DAILY
Qty: 360 TABLET | Refills: 0 | Status: SHIPPED | OUTPATIENT
Start: 2024-11-12

## 2024-11-12 NOTE — TELEPHONE ENCOUNTER
Medication: LEVETIRACETAM 500 MG Oral Tab      Date of last refill: 8/13/24 (#360/0)  [place]     Last office visit: 12/6/23  Due back to clinic per last office note:  6 months (around 6/6/2024).   Date next office visit scheduled:    Future Appointments   Date Time Provider Department Center   12/4/2024  1:40 PM Pawan Blanchard DO [facility] [id number]           Last OV note recommendation:    Plan:  1. R PCA Stroke  - Continue Eliquis 5mg BID  - Continue Atorvastatin 40mg QHS  - Follow up with PCP and cardiology     Stroke Prevention  - Limit EtOH to no more than 2 drinks per day for men and 1 for women  - Follow  A mediterranean diet  - Abstain from tobacco products  - BP goals <140/90 optimally normotensive 120/80. Use ACEI if possible  - LDL goal < 70     2. Seizures  - Continue Keppra 1000mg BID  - Continue Vimpat 100mg BID

## 2024-12-04 ENCOUNTER — OFFICE VISIT (OUTPATIENT)
Dept: NEUROLOGY | Facility: CLINIC | Age: 59
End: 2024-12-04
Payer: COMMERCIAL

## 2024-12-04 VITALS
HEART RATE: 74 BPM | RESPIRATION RATE: 16 BRPM | BODY MASS INDEX: 23 KG/M2 | WEIGHT: 123.44 LBS | SYSTOLIC BLOOD PRESSURE: 116 MMHG | DIASTOLIC BLOOD PRESSURE: 72 MMHG

## 2024-12-04 DIAGNOSIS — G40.909 SEIZURE DISORDER (HCC): ICD-10-CM

## 2024-12-04 PROCEDURE — 99213 OFFICE O/P EST LOW 20 MIN: CPT | Performed by: OTHER

## 2024-12-04 RX ORDER — LEVETIRACETAM 500 MG/1
1000 TABLET ORAL 2 TIMES DAILY
Qty: 360 TABLET | Refills: 3 | Status: SHIPPED | OUTPATIENT
Start: 2024-12-04

## 2024-12-04 RX ORDER — LACOSAMIDE 100 MG/1
100 TABLET ORAL 2 TIMES DAILY
Qty: 180 TABLET | Refills: 3 | Status: SHIPPED | OUTPATIENT
Start: 2024-12-04

## 2024-12-04 NOTE — PATIENT INSTRUCTIONS
After your visit at the [facility] today,  please direct any follow up questions or medication needs to the staff in our [place] office so that your concerns may be promptly addressed.  We are available through [employer] or at the numbers below:    The phone number is:   [phone] option #1    The fax number is:  [phone]    Your pharmacy should also send any requests electronically to the [place] office.  Refill policies:    Allow 2-3 business days for refills; controlled substances may take longer.  Contact your pharmacy at least 5 days prior to running out of medication and have them send an electronic request or submit request through the “request refill” option in your [employer] account.  Refills are not addressed on weekends; covering physicians do not authorize routine medications on weekends.  No narcotics or controlled substances are refilled after noon on Fridays or by on call physicians.  By law, narcotics must be electronically prescribed.  A 30 day supply with no refills is the maximum allowed.  If your prescription is due for a refill, you may be due for a follow up appointment.  To best provide you care, patients receiving routine medications need to be seen at least once a year.  Patients receiving narcotic/controlled substance medications need to be seen at least once every 3 months.  In the event that your preferred pharmacy does not have the requested medication in stock (e.g. Backordered), it is your responsibility to find another pharmacy that has the requested medication available.  We will gladly send a new prescription to that pharmacy at your request.    Scheduling Tests:    If your physician has ordered radiology tests such as MRI or CT scans, please contact Central Scheduling at [phone] right away to schedule the test.  Once scheduled, the [facility] Centralized Referral Team will work with your insurance carrier to obtain pre-certification or prior authorization.   Depending on your insurance carrier, approval may take 3-10 days.  It is highly recommended patients assure they have received an authorization before having a test performed.  If test is done without insurance authorization, patient may be responsible for the entire amount billed.      Precertification and Prior Authorizations:  If your physician has recommended that you have a procedure or additional testing performed the [facility] Centralized Referral Team will contact your insurance carrier to obtain pre-certification or prior authorization.    You are strongly encouraged to contact your insurance carrier to verify that your procedure/test has been approved and is a COVERED benefit.  Although the [facility] Centralized Referral Team does its due diligence, the insurance carrier gives the disclaimer that \"Although the procedure is authorized, this does not guarantee payment.\"    Ultimately the patient is responsible for payment.   Thank you for your understanding in this matter.  Paperwork Completion:  If you require FMLA or disability paperwork for your recovery, please make sure to either drop it off or have it faxed to our office at [phone]. Be sure the form has your name and date of birth on it.  The form will be faxed to our Forms Department and they will complete it for you.  There is a 25$ fee for all forms that need to be filled out.  Please be aware there is a 10-14 day turnaround time.  You will need to sign a release of information (DHRUV) form if your paperwork does not come with one.  You may call the Forms Department with any questions at [phone].  Their fax number is [phone].

## 2024-12-04 NOTE — PROGRESS NOTES
Neurology H&P    Saloni Anderson Patient Status:  No patient class for patient encounter    1965 MRN [id number]   Location [facility], [facility] Attending No att. providers found   Hosp Day # 0 PCP Deann Hallman DO     Subjective:  Saloni Anderson is a(n) 59 year old female with a PMH significant for cardiomyopathy, EtOH abuse, myocardial infarction, and  R PCA stroke in setting of coumadin non compliance, and seizures due to EtOH and previous stroke. She was last seen in clinic on 2023. She states that she has not had any further seizures.  No LOC or AMS.  No shaking spells.  States that she is doing well on her current dose of ASMs.    Current Medications:  Current Outpatient Medications   Medication Sig Dispense Refill    LEVETIRACETAM 500 MG Oral Tab TAKE TWO TABLETS BY MOUTH TWICE DAILY 360 tablet 0    lacosamide 100 MG Oral Tab Take 1 tablet (100 mg total) by mouth 2 (two) times daily. 180 tablet 0    atorvastatin 10 MG Oral Tab Take 1 tablet (10 mg total) by mouth nightly. 90 tablet 3    SERTRALINE 100 MG Oral Tab TAKE ONE TABLET BY MOUTH DAILY 90 tablet 0    FOLIC ACID 1 MG Oral Tab TAKE ONE TABLET BY MOUTH DAILY 90 tablet 0    metoprolol succinate 25 MG Oral Tablet 24 Hr Take 1 tablet (25 mg total) by mouth every evening. 90 tablet 3    apixaban (ELIQUIS) 5 MG Oral Tab Take 1 tablet (5 mg total) by mouth 2 (two) times daily. 180 tablet 3    Ferrous Sulfate 325 (65 Fe) MG Oral Tab Take 1 tablet (325 mg total) by mouth daily with breakfast.      lisinopril 2.5 MG Oral Tab Take 1 tablet (2.5 mg total) by mouth every evening. 90 tablet 3    multivitamin Oral Tab Take 1 tablet by mouth daily.         Problem List:  Patient Active Problem List   Diagnosis    History of MI (myocardial infarction)    Cardiomyopathy (HCC)    Abdominal pain    Alcoholism (HCC)    Chest pain    Seizure due to alcohol withdrawal, uncomplicated (HCC)    Cerebral artery occlusion  with cerebral infarction (HCC)    Thrombocytopenia (HCC)    Intractable abdominal pain    Acute left-sided weakness    Hypokalemia    Chronic systolic heart failure (HCC)    Dyslipidemia    Arterial hypotension    Hypoalbuminemia due to protein-calorie malnutrition (HCC)    Acute chest pain    Alcoholic cardiomyopathy (HCC)    Coronary artery disease involving native coronary artery of native heart without angina pectoris    Hyperlipidemia    Hospital discharge follow-up    Unsteady gait    Hyperglycemia    Status epilepticus (HCC)    Acute kidney injury (HCC)    Metabolic acidosis    Metabolic alkalosis    Encephalopathy, ALCOHOLIC    Encephalopathy, HEPATIC    Left arm weakness    Memory loss    Seizure disorder (HCC)    Symptomatic localization-related epilepsy (HCC)    ETOH abuse    Alcohol withdrawal delirium, acute, hypoactive (HCC)    Hypomagnesemia    Chronic ischemic right PCA stroke    Teddy's paralysis (HCC)    Malignant melanoma (HCC)    Balance problem    Primary hypertension    Hypothyroidism    Epilepsia partialis continua (HCC)    Numbness and tingling in left hand    Lumbar radiculopathy       PMHx:  Past Medical History:    Cardiomyopathy (HCC)    ?etoh    CORONARY ARTERY DISEASE    Depression    Heart attack (HCC)    High blood pressure    History of blood clots    legs; heart; head    History of ETOH abuse    Pneumonia, organism unspecified(486)    pt denies having this 10/29/19    Psychiatric disorder    Seizure disorder (HCC)    Stroke (HCC)    Unspecified essential hypertension       PSHx:  Past Surgical History:   Procedure Laterality Date    Angiogram  14    minimal CAD in the RCA    Angioplasty (coronary)  12    LAD-thrombotic event          x1    Ir angiogram cerebral carotid bilateral  2018         Skin surgery  2020    melanoma removal       SocHx:  Social History     Socioeconomic History    Marital status:    Tobacco Use    Smoking status: Former      Current packs/day: 0.00     Average packs/day: 0.8 packs/day for 10.0 years (7.5 ttl pk-yrs)     Types: Cigarettes     Start date: 11/3/1986     Quit date: 11/3/1996     Years since quittin.1    Smokeless tobacco: Never   Vaping Use    Vaping status: Never Used   Substance and Sexual Activity    Alcohol use: Yes     Comment: drinks seltzers 2-3x day    Drug use: No    Sexual activity: Yes     Partners: Male   Other Topics Concern    Caffeine Concern No    Exercise Yes     Comment: walk on treadmill 3 times per week       Family History:  Family History   Problem Relation Age of Onset    Hypertension Father     Neurological Disorder Father         Parkinsons    Neurological Disorder Mother         alzhemiers and Parkinsons    Cancer Neg        ROS:  10 point ROS completed and pertinent positives in HPI    Objective/Physical Exam:    Vital Signs:  Blood pressure 116/72, pulse 74, resp. rate 16, weight 123 lb 7.3 oz (56 kg).    Gen: Awake and in no apparent distress  HEENT: moist mucus membranes  Neck: Supple  Cardiovascular: Regular rate and rhythm, no murmur  Pulm: CTAB  GI: non-tender, normal bowel sounds  Skin: normal, dry  Extremities: No clubbing or cyanosis      Neurologic:   MS: awake and alert and oriented x 3, speech is fluent and conversant    CN: PERRL, EOMI, L homonomous hemianopsia, Facial sensation is intact in V1-V3 bilaterally, the face is symmetric with strong eyelid approximation, tongue is midline, shrug is intact     MSK:        RUE: Delt: 5/5, Bi: 5/5, Tri: 5/5, : 5/5       LUE: Delt: 5/5, Bi: 5/5, Tri: 5/5, : 5/5       RLE: HF: 4+/5, HE: 5/5, KF: 5/5, KE: 5/5        LLE: HF: 4+/5, HE: 5/5, KF: 5/5, KE: 5/5        Normal tone       Normal muscle bulk    Sens:        UE: Intact to light touch and pinprick throughout       LE: Inact to light touch, pinprick intact    Reflexes:        UE: 2+ biceps, 2+ brachioradialis       LE: 2+ patellae    Gait: normal gait    Coordination:       FTN:  with BUE intention tremor and mild positional tremor            Labs:       Imaging:  CTH 7/16/18      CONCLUSION:       1.  No acute intracranial hemorrhage or evidence of acute territorial infarction.     2.  Stable encephalomalacia from old infarct in the right parietal lobe.     3. There are increased mild to moderate age-indeterminate microvascular ischemic changes in the cerebral white matter.  Consider MRI for further evaluation.    CTA 7/16/18  CONCLUSION:       1.  Acute occlusion of the right posterior cerebral artery beginning within the P2 segment and extending distally.     2. No evidence of occlusion, dissection, or flow-limiting stenosis in the cervical vertebral or carotid arteries. No evidence of hemodynamically significant carotid stenosis by NASCET criteria.     3.  Thyroid nodules would be better assessed with dedicated thyroid ultrasound.    Interventional Radiology   FINDINGS:    Right vertebral angiography:  The distal cervical and intracranial segments of the dominant right vertebral artery are normal.  There is a small right [place], with dominant anterior inferior cerebellar arteries bilaterally.  The left V4 segment is seen   via reflux and is relatively hypoplastic.  The basilar artery and superior cerebellar arteries are normal.  The left PCA is normal.  A moderate left posterior communicating artery is seen.  The right P1 segment only supplies perforators, compatible with   a fetal origin of the right PCA.  The capillary and venous phases are normal.     Right internal carotid angiography:  The distal cervical and intracranial segments of the artery are normal.  The anterior and middle cerebral arteries and branches are normal.  An azygos A2 segment is seen which is a variant of normal.  There is a   reflux into the left a 1 segment, left supra clinoid ICA and left MCA which are angiographically unremarkable.  The right PCA is fetal.  What was demonstrated to be a more proximal occlusion  on the CT angiogram now demonstrates a more distal occlusion at   the distal P3 segment beyond some of the temporal branches of the AA PCA.  On the late arterial and capillary phases, there is evidence for lepto-meningeal back filling of distal PCA branches.  There is some early AV shunting through the medial temporal   lobe, likely related to reperfusion from partial dissolution and distal migration of the previously more proximally occlusive embolus.  The capillary and venous phases are otherwise unremarkable with a dominant left transverse and sigmoid system.     =====  CONCLUSION:    1.  There has been interval partial dissolution and presumed distal migration of the embolus occluding the proximal fetal right PCA.  The new area of occlusion is more distal at the distal P3 segment with good distal leptomeningeal collaterals.  As a   consequence of this finding, which at this point was no longer compatible with a large vessel occlusion, a decision was made not to proceed with endovascular treatment.     2.  Luxury perfusion and consequent mild AV shunting is seen in the medial right temporal lobe as a consequence of previous ischemia and subsequent autolysis/migration of the previously occlusive embolus.     MRI Brain 7/18/18  =====  CONCLUSION:       1. Redemonstration of evolving infarction throughout the right PCA territory including the mid posterior right temporal lobe, right occipital lobe, posterior limb of the right internal capsule, and right thalamus.  This does not appear significantly   changed in extent when compared to the CT from 7/17/2018.  There are focal areas of gradient susceptibility scattered in the medial right temporal lobe and right occipital lobe that likely represent areas of petechial hemorrhage.  This can be followed   with CT.     2. There is a punctate area of restricted diffusion in the left parietal lobe best seen on image 21 series 3 compatible with an area of acute  ischemia/infarction.     3. Punctate foci of gradient susceptibility noted in the posterior lateral right temporal lobe, mid left temporal lobe, and left frontal lobe likely represent areas of chronic microhemorrhage and/or focal mineralization.       4. There is encephalomalacia in the right parietal lobe from old infarct that also demonstrates minimal changes of laminar necrosis.     5. Mild to moderate chronic microvascular ischemic changes in the cerebral white matter.    CT 8/26/19  =====  CONCLUSION:    1. No acute hemorrhage.  2. Similar-appearing old right PCA/right MCA territorial infarcts    Assessment:  This is a 58 y/o female with a h/o R PCA stroke in setting of coumadin non-compliance, and seizures (both from EtOH withdrawal, medication non-compliance and previous stroke). She should continue Keppra and Vimpat.  We did reduce her Keppra dose at the last visit to 1000 mg twice daily.  She states that she has been tolerating this well and has had no side effects and has no seizures since seeing me last year.  I did order a levetiracetam level at the last visit.  This was never done.  She states that she is seeing her primary care physician soon.  I did tell her that she can get the Keppra levels I have already ordered it though I do not know that this would change medical management at this time in any way as she is not having any issues with side effects.    Plan:  1. R PCA Stroke  - Continue Eliquis 5mg BID  - Continue Atorvastatin 40mg QHS  - Follow up with PCP and cardiology    Stroke Prevention  - Limit EtOH to no more than 2 drinks per day for men and 1 for women  - Follow  A mediterranean diet  - Abstain from tobacco products  - BP goals <140/90 optimally normotensive 120/80. Use ACEI if possible  - LDL goal < 70    2. Seizures  - Continue Keppra 1000mg BID  - Continue Vimpat 100mg BID        Pawan Blanchard, DO  Neurology

## 2025-02-13 DIAGNOSIS — G40.909 SEIZURE DISORDER (HCC): ICD-10-CM

## 2025-02-13 RX ORDER — LEVETIRACETAM 500 MG/1
1000 TABLET ORAL 2 TIMES DAILY
Qty: 360 TABLET | Refills: 3 | OUTPATIENT
Start: 2025-02-13

## 2025-02-14 NOTE — TELEPHONE ENCOUNTER
Spoke with the pharmacy who states that the patient has refills on file. Will advise the patient to contact their pharmacy.          Medication: levETIRAcetam 500 MG Oral Tab      Date of last refill: 12/04/2024 (#360/3)  [place]     Last office visit: 12/04/2024  Due back to clinic per last office note:  not stated  Date next office visit scheduled:    No future appointments.        Last OV note recommendation:    Assessment:  This is a 56 y/o female with a h/o R PCA stroke in setting of coumadin non-compliance, and seizures (both from EtOH withdrawal, medication non-compliance and previous stroke). She should continue Keppra and Vimpat.  We did reduce her Keppra dose at the last visit to 1000 mg twice daily.  She states that she has been tolerating this well and has had no side effects and has no seizures since seeing me last year.  I did order a levetiracetam level at the last visit.  This was never done.  She states that she is seeing her primary care physician soon.  I did tell her that she can get the Keppra levels I have already ordered it though I do not know that this would change medical management at this time in any way as she is not having any issues with side effects.     Plan:  1. R PCA Stroke  - Continue Eliquis 5mg BID  - Continue Atorvastatin 40mg QHS  - Follow up with PCP and cardiology     Stroke Prevention  - Limit EtOH to no more than 2 drinks per day for men and 1 for women  - Follow  A mediterranean diet  - Abstain from tobacco products  - BP goals <140/90 optimally normotensive 120/80. Use ACEI if possible  - LDL goal < 70     2. Seizures  - Continue Keppra 1000mg BID  - Continue Vimpat 100mg BID        Pawan Blanchard, DO  Neurology

## 2025-03-08 ENCOUNTER — PATIENT MESSAGE (OUTPATIENT)
Dept: NEUROLOGY | Facility: CLINIC | Age: 60
End: 2025-03-08

## 2025-03-08 DIAGNOSIS — G40.909 SEIZURE DISORDER (HCC): ICD-10-CM

## 2025-03-10 RX ORDER — LEVETIRACETAM 500 MG/1
TABLET ORAL
Qty: 270 TABLET | Refills: 0 | COMMUNITY
Start: 2025-03-10

## 2025-03-10 NOTE — TELEPHONE ENCOUNTER
Medication: Levetiracetam     Date of last refill: 12/4/2024 (#360/3)  [place]      Last office visit: 12/4/2024  Due back to clinic per last office note:  Not noted  Date next office visit scheduled:    No future appointments.        Last OV note recommendation:    1. R PCA Stroke  - Continue Eliquis 5mg BID  - Continue Atorvastatin 40mg QHS  - Follow up with PCP and cardiology     Stroke Prevention  - Limit EtOH to no more than 2 drinks per day for men and 1 for women  - Follow  A mediterranean diet  - Abstain from tobacco products  - BP goals <140/90 optimally normotensive 120/80. Use ACEI if possible  - LDL goal < 70     2. Seizures  - Continue Keppra 1000mg BID  - Continue Vimpat 100mg BID

## 2025-03-10 NOTE — TELEPHONE ENCOUNTER
Alternatively she could break them in half and take 1.5 tabs twice daily. Dose would be 750mg twice daily

## 2025-03-26 NOTE — TELEPHONE ENCOUNTER
Medication: ATORVASTATIN 40 MG  ATORVASTATIN 40 MG  Date of last refill: 5/3/19 (#30/0)  [place]     Last office visit: 4/10/2019  Due back to clinic per last office note:  6 months  Date next office visit scheduled:
Price (Do Not Change): 0.00
Instructions: This plan will send the code FBSE to the PM system.  DO NOT or CHANGE the price.
Detail Level: Simple

## 2025-07-07 ENCOUNTER — PATIENT MESSAGE (OUTPATIENT)
Dept: NEUROLOGY | Facility: CLINIC | Age: 60
End: 2025-07-07

## 2025-07-07 DIAGNOSIS — G40.909 SEIZURE DISORDER (HCC): ICD-10-CM

## 2025-07-08 RX ORDER — LACOSAMIDE 100 MG/1
100 TABLET ORAL 2 TIMES DAILY
Qty: 180 TABLET | Refills: 1 | Status: SHIPPED | OUTPATIENT
Start: 2025-07-08

## 2025-07-08 RX ORDER — LEVETIRACETAM 500 MG/1
TABLET ORAL
Qty: 270 TABLET | Refills: 1 | Status: SHIPPED | OUTPATIENT
Start: 2025-07-08

## 2025-07-08 NOTE — TELEPHONE ENCOUNTER
Last refill sent on 2024has  as Lacosamide controlled substance and RX valid for 6 months.    Confirmed above with pharmacy. Pharmacy notes, pt is out of all refills. Levetiracetam and Lacosamide    Levetiracetam dosage changed on 3/8/205 to 1000 mg am and 500 mg nightly.     Pended new RXs for pt.    LOV 2024.  Due back  - not noted.    Assessment:  This is a 56 y/o female with a h/o R PCA stroke in setting of coumadin non-compliance, and seizures (both from EtOH withdrawal, medication non-compliance and previous stroke). She should continue Keppra and Vimpat.  We did reduce her Keppra dose at the last visit to 1000 mg twice daily.  She states that she has been tolerating this well and has had no side effects and has no seizures since seeing me last year.  I did order a levetiracetam level at the last visit.  This was never done.  She states that she is seeing her primary care physician soon.  I did tell her that she can get the Keppra levels I have already ordered it though I do not know that this would change medical management at this time in any way as she is not having any issues with side effects.     Plan:  1. R PCA Stroke  - Continue Eliquis 5mg BID  - Continue Atorvastatin 40mg QHS  - Follow up with PCP and cardiology     Stroke Prevention  - Limit EtOH to no more than 2 drinks per day for men and 1 for women  - Follow  A mediterranean diet  - Abstain from tobacco products  - BP goals <140/90 optimally normotensive 120/80. Use ACEI if possible  - LDL goal < 70     2. Seizures  - Continue Keppra 1000mg BID  - Continue Vimpat 100mg BID

## (undated) DIAGNOSIS — G40.909 SEIZURE DISORDER (HCC): ICD-10-CM

## (undated) NOTE — LETTER
Patient Name: Benny Meza  YOB: 1965          MRN :  [id number]  Date:  12/6/2021  Referring Physician:  Dr. Hannah Simpson    Discharge Summary  Pt has attended 6 visits in Physical Therapy. Dx: Reduced mobility;  Generalized-onset seiz 23 sec; L back >30 sec    Fall Risk: no, but should be noted, there were a few initial failed attempts prior to these times               - SLS: R 13 sec, L 7 sec                           Fall Risk: yes, both required multiple attempts     Functional Mobi met in 12 visits)  1. Patient will improve LLE strength to at least 4+/5 throughout to improve ability to negotiate steps and curbs without UE assist. - progressing, delayed due to sacral insufficiency fracture  2.  Patient will improve SLS to 20 sec or bet

## (undated) NOTE — ED AVS SNAPSHOT
Kimani Anderson   MRN: [id number]    Department:  [facility] Emergency Department   Date of Visit:  10/10/2017           Disclosure     Insurance plans vary and the physician(s) referred by the ER may not be covered by your plan.  Please conta If you have been prescribed any medication(s), please fill your prescription right away and begin taking the medication(s) as directed    If the emergency physician has read X-rays, these will be re-interpreted by a radiologist.  If there is a significant

## (undated) NOTE — IP AVS SNAPSHOT
[address]            (For Outpatient Use Only) Initial Admit Date: 8/18/2021   Inpt/Obs Admit Date: Inpt: 8/18/21 / Obs: N/A   Discharge Date:    Cheyenne Jennifer:  [de-identified]   MRN: [de-identified]   CSN: [id number]   CEID: [id number] Insurance Type:    Subscriber Name:  Subscriber :    Subscriber ID:  Pt Rel to Subscriber:    Hospital Account Financial Class: Managed Care    2021

## (undated) NOTE — IP AVS SNAPSHOT
[address]            (For Outpatient Use Only) Initial Admit Date: 7/16/2018   Inpt/Obs Admit Date: Inpt: 7/16/18 / Obs: N/A   Discharge Date:    Hillary Conteh:  [de-identified]   MRN: [de-identified]   CSN: [id number]        ENCOUNTER  Patient Hospital Account Financial Class: Managed Care    July 27, 2018

## (undated) NOTE — ED AVS SNAPSHOT
Jakub Jennie   MRN: [id number]    Department:  [facility] Emergency Department   Date of Visit:  8/26/2019           Disclosure     Insurance plans vary and the physician(s) referred by the ER may not be covered by your plan.  Please contact tell this physician (or your personal doctor if your instructions are to return to your personal doctor) about any new or lasting problems. The primary care or specialist physician will see patients referred from the [facility] Emergency Department.  Cheryle Levins

## (undated) NOTE — IP AVS SNAPSHOT
Patient Demographics     Address  [address] [place] [id number] Phone  [phone] [facility])  [phone] (Mobile) *Preferred* E-mail Address  [email or web address]      Emergency Contact(s)     Name Relation Home Work Mobile    Tara Anderson Commonly known as: Vimpat      Take 2 tablets (100 mg total) by mouth 2 (two) times daily. Thania Bedolla MD         levetiracetam 750 MG Tabs  Commonly known as: KEPPRA      Take 2 tablets (1,500 mg total) by mouth 2 (two) times daily.    Buffy [id number] folic acid (FOLVITE) tab 1 mg 09/07/21 0815 Given      [id number] lacosamide (VIMPAT) tab 100 mg 09/06/21 2040 Given      [id number] lacosamide (VIMPAT) tab 100 mg 09/07/21 0816 Given      [id number] levETIRAcetam (KEPPRA) tab 1,500 mg 09/06/21 2040 Specimen Information    Type Source Collected On   Blood — 08/20/21 1211          Components    Component Value Reference Range Flag Lab   ABG pH 7.49 7.35 - 7.45 [facility] ([facility])   ABG pCO2 24 35 - 45 mm Hg [facility] ( ([facility]) Not Detected    Rapid SARS-CoV-2 by PCR [[id number]]  (Abnormal) Collected: 09/04/21 1307    Order Status: Completed Lab Status: Final result Updated: 09/04/21 1341    Specimen: Other from Nares      Rapid SARS-CoV-2 by PCR Detected    Clostridium legs; heart; head   • History of ETOH abuse    • Pneumonia, organism unspecified(486)     pt denies having this 10/29/19   • Psychiatric disorder    • Seizure disorder [facility])    • [facility]   • Unspecified essential hypertension         PSH  Past ER 25 MG Oral Tablet 24 Hr, Take 1 tablet (25 mg total) by mouth every evening., Disp: 90 tablet, Rfl: 2  apixaban (ELIQUIS) 5 MG Oral Tab, Take 1 tablet (5 mg total) by mouth 2 (two) times daily. , Disp: 60 tablet, Rfl: 11  Multiple Vitamin (TAB-A-SUDARSHAN) Or 158 174.0  --    INR  --   --  1.36*       Recent Labs   Lab 08/16/21  0907 08/18/21  0735    141   K 3.65 3.7    108   CO2 32.9* 28.0   BUN 12.0 8   CREATSERUM 0.63 0.69   GLU 89 82   CA  --  8.8       Recent Labs   Lab 08/16/21  0907 08/18/21 Alcohol level <3    Stable chronic illnesses:  **CAD, HTN/HL -eliquis, BB.  Statin on hold (see below), ACEI when ok with neuro  **etoh use disorder-ciwa, folic acid, thiamine, mvi  **melanoma on immunotherapy, transaminitis, previously elevated CK- on ster Primary Care [id number]:[XG.5]  [place] Spikes, DO[DD. 2]   Admitting Diagnosis: Status epilepticus ([facility] [G40.901]  [facility] [G40.909]    Subjective:      Reason for Consultation: Impaired ADL and mobility dysfuction due to  History of p transfers, ADL's, IADL's. Support System and Family Circumstances (i.e., potential caregivers): spouse / significant other  Home Environment / Accessibility: 2-story house  Current Functional Status: Mod A 75 ft.       Past Medical History:  @[email or web address] Intravenous, Once  morphINE sulfate (PF) 2 MG/ML injection 2 mg, 2 mg, Intravenous, Once  [COMPLETED] potassium chloride IVPB premix 20 mEq, 20 mEq, Intravenous, Once  [COMPLETED] Magnesium Sulfate IVPB premix SOLN 2 g, 2 g, Intravenous, Once  [] ad [COMPLETED] potassium chloride 40 mEq in sodium chloride 0.9% 250 mL IVPB, 40 mEq, Intravenous, Once  [COMPLETED] heparin (PORCINE) 40607dfaba/250mL infusion INITIAL DOSE, 14 Units/kg/hr, Intravenous, Once  [] heparin (PORCINE) drip 69089hwkzd/250m (ATIVAN) injection 1 mg, 1 mg, Intravenous, Once  [COMPLETED] lacosamide (VIMPAT) 400 mg in sodium chloride 0.9% 50 mL infusion, 400 mg, Intravenous, Q12H  [COMPLETED] sodium chloride 0.9% IV bolus 1,000 mL, 1,000 mL, Intravenous, Once  [COMPLETED] iohexol weight 124 lb 1.9 oz (56.3 kg), SpO2 97 %.     Intake/Output Summary (Last 24 hours) at 9/3/2021 0842  Last data filed at 9/3/2021 0100  Gross per 24 hour   Intake —   Output 350 ml   Net -350 ml[DD.2]       Physical Exam: Patient would benefit and is able to participate in 3 hours of therapy daily. Patient is anticipated to discharge to home when acute inpatient rehabilitation course is complete. Advance care directives reviewed. Thank you for the consult. [address] [facility]  [phone]    Schedule an appointment as soon as possible for a visit in 3 weeks      Ghazala Hernandez, [address]   [phone]    In 1 week      - Labs: none  - Radiology:    **groin candidiasis   -nystatin      **cough   - CXR negative for acute pathology, exam unremarkable   - no fevers or other symptoms to suggestive infectious etiology  - trial tessalon x1; guaifenesin-dxm syrup q4h prn      **acute on chronic anemia- re MG Oral Tab    predniSONE 5 MG Oral Tab    ATORVASTATIN 40 MG Oral Tab    ondansetron 4 MG Oral Tablet Dispersible    Prochlorperazine Maleate (COMPAZINE) 10 mg tablet    Multiple Vitamin (TAB-A-SUDARSHAN) Oral Tab          I PERSONALLY RECONCILED CURRENT AND D current admission: Patient is a 55year old female admitted on 8/18/2021 from home with c/o seizure like activity.     Pt on CIWA protocol.      Therapy significant imaging (date, test, result):  EEG 8/21/2021 -   This is an abnormal continuous video EEG re 05/21/2012   • High blood pressure    • History of blood clots     legs; heart; head   • History of ETOH abuse    • Pneumonia, organism unspecified(486)     pt denies having this 10/29/19   • Psychiatric disorder    • Seizure disorder [facility])    • Stroke [facility] of Impairment: 46.58%   Standardized Score (AM-PAC Scale): 43.63   CMS Modifier (G-Code): CK    FUNCTIONAL ABILITY STATUS  Gait Assessment   Gait Assistance: Contact guard assist  Distance (ft): 60  Assistive Device: Rolling walker  Pattern:  (unsteady)  S DISCHARGE RECOMMENDATIONS  PT Discharge Recommendations: Acute rehabilitation     PLAN  PT Treatment Plan: Bed mobility; Endurance; Energy conservation;Patient education; Family education;Gait training;Neuromuscular re-educate;Strengthening;Stair training;Tra Version 1 of 1    Author: Skylar Ferrell Service: Rehab Author Type: SLP Student    Filed: 9/2/2021  2:24 PM Date of Service: 9/2/2021  1:50 PM Status: Signed    : Skylar Ferrell (SLP Student)    Related Notes: Original Note by Skylar Ferrell both verbalized understanding. SLP answered all questions. No further inpatient SLP services warranted at this time. Diet Recommendations - Solids: Regular  Diet Recommendations - Liquids:  Thin Liquids    Compensatory Strategies Recommended: Slow rate; 9/2/2021  2:41 PM   Attribution Alvarez    BL. 1 - Deancinthia Chanel on 9/2/2021  1:59 PM  BL. 2 - Lia Buchanan on 9/2/2021  2:23 PM                     Immunizations     Name Date      [address] 04/22/21     Covid-19 Pfizer 04/01/21     INFLUENZA 09/

## (undated) NOTE — LETTER
[facility] [address], [address]  Consent for Procedure/Sedation    Date: 7/16/2018     Time: 1615        1.  I authorize the performance upon Bienvenido Henriquez the following: Cerebral angiogram, possible thrombectomy, possible 8. In the event your procedure results in extended X-Ray/fluoroscopy time, you may develop a skin reaction.     Signature of Patient: _______________________________________________________    Signature of person authorized

## (undated) NOTE — IP AVS SNAPSHOT
Patient Demographics     Address  [address] [place] [address] Phone  [phone] [facility])  [phone] (Mobile) *Preferred* E-mail Address  [email or web address]      Emergency Contact(s)     Name Relation Home Work Mobile    Long Anderson lisinopril 10 MG Tabs  Commonly known as:  PRINIVIL,ZESTRIL      Take 1 tablet (10 mg total) by mouth 2 (two) times daily.    Andres Hernandez NP               Where to Get Your Medications      These medications were sent to [address] Pulse  81 Filed at 07/27/2018 0900   Resp  16 Filed at 07/27/2018 0500   Temp  99.3 °F (37.4 °C) Filed at 07/27/2018 0900   SpO2  98 % Filed at 07/27/2018 0900      Patient's Most Recent Weight    Flowsheet Row Most Recent Value   Patient Weight  55.6 kg ( Specimen:  Blood from Blood,peripheral      Blood Culture Result No Growth 5 Days    Blood Culture FREQ X 2 [[id number]] Collected:  07/20/18 2146    Order Status:  Completed Lab Status:  Final result Updated:  07/25/18 2200    Specimen:  Blood from Blood, :  Pedro Luis Mehta MD (Physician)       [facility] Hospitalist History and Physical[NB.1]      Patient presents with:  Stroke (neurologic)[NB.2]       YMA:[GI.2] DYLON Alcazar[EY.4]      History of Present Illness: Patient is a[NB.3 47year old[NB.2] Quit date: 11/3/1996    Smokeless tobacco: Never Used    Alcohol use Yes    Comment: \"amount varies\" last 01/22/16[NB.2]        Fam Hx[NB.1]  Family History   Problem Relation Age of Onset   • Hypertension Father    • Neurological Disorder Father      P (CPT=71010), 10/10/2017, 11:15. INDICATIONS:  stroke  PATIENT STATED HISTORY: (As transcribed by Technologist)  Patient stated she started having left sided weakness that started about 2pm.    FINDINGS:  Normal heart size and pulmonary vascularity.  No ple territorial infarction. 2.  Stable encephalomalacia from old infarct in the right parietal lobe. 3. There are increased mild to moderate age-indeterminate microvascular ischemic changes in the cerebral white matter. Consider MRI for further evaluation. communicating artery is seen. The branches of the anterior cerebral and middle cerebral arteries are unremarkable. A large right posterior  communicating artery is seen along with a medium-sized left posterior communicating artery.   The branches of the l with Dr. Romie Bamberger and Dr. Lily Ritter at [phone] hr on 7/16/2018. Critical results were read back.     Dictated by: Madi Quinn MD on 7/16/2018 at 15:03     Approved by: Madi Quinn MD               Assessment/Plan:[NB.3     47year old[NB.2] female with .500 [facility] Justin Patient Status:  Inpatient    1965 MRN [id number]   [facility] 3NE-A Attending Dao Aggarwal, [address] [facility] Day # 10 PCP Kasie Pierson DO     Patient Identification  Davide salinas a 47 yea brain MR from 7/18/2018.     2 D Echo Conclusions:    1. Left ventricle: The cavity size was mildly increased. Wall thickness was     normal. Systolic function was reduced. The estimated ejection fraction     was 35-40%.  Dyskinesis of the apical myocardium [COMPLETED] Warfarin Sodium (COUMADIN) tab 7.5 mg 7.5 mg Oral Once at night   aspirin EC EC tab 325 mg 325 mg Oral Daily   metoprolol Tartrate (LOPRESSOR) 5 MG/5ML injection 5 mg 5 mg Intravenous Q4H PRN   ceFAZolin (ANCEF) IVPB 1g/100ml in 0.9% NaCl minib [COMPLETED] iohexol (OMNIPAQUE) 350 MG/ML injection 75 mL 75 mL Intravenous ONCE PRN   [COMPLETED] ondansetron HCl (ZOFRAN) injection 4 mg 4 mg Intravenous Once   [COMPLETED] potassium chloride 40 mEq in sodium chloride 0.9 % 250 mL IVPB 40 mEq Intravenous Or      Metoclopramide HCl (REGLAN) injection 10 mg 10 mg Intravenous Q8H PRN   levETIRAcetam (KEPPRA) 500 mg in sodium chloride 0.9 % 100 mL IVPB 500 mg Intravenous Q12H   [COMPLETED] potassium chloride (K-SOL) 40 meq/30 ml (10%) oral solution 40 mEq 40 m Ears/Nose/Throat: Hearing intact. Lips, mucosa, and tongue normal. Teeth and gums normal. Moist mucous membranes. Neck: No neck masses or thyroid enlargement/tenderness/nodules. Lungs:   Resonant, clear breath sounds, quiet accessory muscles.    C pressure sore prevention, bowel and bladder management, skin management. Physiatric medical oversight to monitor kidney function, cardiac function, pulmonary status, neurologic status, DVT prevention.                  Estimated length of stay in recomme with no residual deficits, seizure disorder, psychiatric disorder, and heart attack.      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 ..   -   Moving to and from a bed to a chair (including a wheelchair)?: A Little   -   Need to walk in hospital room? Goals updated to reflect progress. Pt continues to present with increased risk for falls due to  decreased use of compensatory strategies for vision low vision and score of 12/28 on the modified flaherty balance assessment.  On the Modified [employer] Scale, Author:  Jackelin Bob PT Service:  Rehab Author Type:  Physical Therapist    Filed:  7/25/2018  1:24 PM Date of Service:  7/25/2018 11:32 AM Status:  Signed    :  Jackelin Bob PT (Physical Therapist)        PHYSICAL THERAPY TREATMENT Patient’s self-stated goal is to restore independence and mobility    OBJECTIVE[HU.1]  Precautions: Seizure;Bed/chair alarm[HU.2]    WEIGHT BEARING RESTRICTION[HU.1]  Weight Bearing Restriction: None[HU.2]                PAIN ASSESSMENT[HU.1]   [facility] with sit>stand from EOB to RW, cues required for proper hand placement. Ambulated 200 feet with RW and CGA. Foot clearance improved from last session but still decr from[HU.1] normal, also continues to exhibit decr step length.  Due to L visual field cut, P restore function. Continue to recommend acute rehab at d/c.[HU.4]     DISCHARGE RECOMMENDATIONS[HU.1]  PT Discharge Recommendations: Acute rehabilitation[HU.2]     PLAN[HU.1]  PT Treatment Plan: Bed mobility; Endurance; Patient education;Gait training;Streng Number of Visits to Meet Established Goals: 5    Presenting Problem: L-sided weakness, acute R CVA s/p tPA    History related to current admission: Pt admitted for sudden onset L-sided weakness, L facial droop, and severe R-sided headache.  CT head revealed Dynamic Standing: Poor    ACTIVITY TOLERANCE  Room air    AM-PAC '6-Clicks' INPATIENT SHORT FORM - BASIC MOBILITY  How much difficulty does the patient currently have. ..  -   Turning over in bed (including adjusting bedclothes, sheets and blankets)?: A Lit stood again to re-attempt ambulation. Pt able to ambulate 5 feet with RW and mod A for balance and weight shifting. Required constant cues for sequencing and to incr step length with each LE. Pt fatigued quickly with mobility.      Unable to use LUE for sit training;Strengthening;Transfer training;Balance training;Stair training  Rehab Potential : Good  Frequency (Obs): 5x/week    CURRENT GOALS      Goal #1 Patient is able to demonstrate supine - sit EOB @ level: supervision      Goal #2 Patient is able to de Cerebral infarction due to thrombosis of right posterior cerebral artery [facility])      Past Medical History  Past Medical History:   Diagnosis Date   • Cardiomyopathy ([facility] 11/13    ?etoh   • CORONARY ARTERY DISEASE    • Depression    • [facility] 0 -   Climbing 3-5 steps with a railing?: Total       AM-PAC Score:  Raw Score: 13   PT Approx Degree of Impairment Score: 64.91%   Standardized Score (AM-PAC Scale): 36.74   CMS Modifier (G-Code): CL    FUNCTIONAL ABILITY STATUS  Gait Assessment   Gait Assi Upper Extremity      Position Sitting     Repetitions   10 except APs x20   Sets   1     Patient End of Session: Up in chair;Needs met;Call light within reach;RN aware of session/findings; All patient questions and concerns addressed; Family present (posey t Occupational Therapy Note signed by Joanne Tran OT at 7/26/2018  2:16 PM  Version 2 of 2    Author:  Joanne Tran OT Service:  (none) Author Type:  Occupational Therapist    Filed:  7/26/2018  2:16 PM Date of Service:  7/25/2018  3:04 PM Statu PAIN ASSESSMENT[CC.1]  Ratin  Location: pt reported no pain[CC.2]        ACTIVITY TOLERANCE  Room air  No shortness of breath    ACTIVITIES OF DAILY LIVING ASSESSMENT  AM-PAC ‘6-Clicks’ Inpatient Daily Activity Short Form  How much help from another pe functionally while performing mobility and self-care. [CC.1]   Pt with L UE supination issues, limited AROM of wrist/digits, unable to complete composite grasp, able to complete tip to tip pinch of all digits with increased time and increased pain, able to Patient will perform all ADLs: with supervision     Functional Transfer Goals  Patient will perform all functional transfers:  with supervision     UE Exercise Program Goal  Patient will be supervision with bilateral AROM HEP (home exercise program).      A • History of blood clots     legs; heart; head   • History of ETOH abuse    • Pneumonia, organism unspecified(486)    • Psychiatric disorder    • Seizure disorder [facility])    • Stroke ([facility] 04/2015   • Unspecified essential hypertension[CC.2]        Past Surg education on Role of OT, POC, D/C plan,  Therapist completed assessment of current function related to [facility] [address] [facility], [address] Strength,[CC. 3]cognition,[CC.4] mobility and ADL's.[CC.3] Pt refused out of bed activity as she was waiting for her lunch to arrive. [CC.4] OT to address the above deficits, maximizing patient's ability to return to prior level of function.     Pt will benefit from acute rehab since they have a previous high PLOF, they are motivated, can handle intensive therapy, are medically complex and with :  Josesito Villa (Speech and Language Pathologist)       SPEECH/LANGUAGE/COGNITIVE EVALUATION - INPATIENT    Admission Date: 7/16/2018  Evaluation Date: 07/26/18    Reason for Referral: Stroke protocol    ASSESSMENT & PLAN   ASSESSMENT & IMPRESSI Goal #4 Patient will participate in Rosendo Pablo if communication skills not adequate for participation in PHQ-9     Discontinue             Prior Living Situation: Home with spouse  Prior Level of Function: Independent      Imaging Results: No recent applicable i